# Patient Record
Sex: FEMALE | Race: WHITE | NOT HISPANIC OR LATINO | Employment: FULL TIME | URBAN - METROPOLITAN AREA
[De-identification: names, ages, dates, MRNs, and addresses within clinical notes are randomized per-mention and may not be internally consistent; named-entity substitution may affect disease eponyms.]

---

## 2017-01-21 DIAGNOSIS — Z96.653 STATUS POST TOTAL BILATERAL KNEE REPLACEMENT: ICD-10-CM

## 2017-01-21 RX ORDER — MELOXICAM 15 MG/1
TABLET ORAL
Qty: 30 TABLET | Refills: 0 | Status: SHIPPED | OUTPATIENT
Start: 2017-01-21 | End: 2017-03-03 | Stop reason: SDUPTHER

## 2017-01-21 RX ORDER — LOSARTAN POTASSIUM AND HYDROCHLOROTHIAZIDE 25; 100 MG/1; MG/1
TABLET ORAL
Qty: 90 TABLET | Refills: 0 | Status: CANCELLED | OUTPATIENT
Start: 2017-01-21

## 2017-01-21 NOTE — TELEPHONE ENCOUNTER
EH patient    I have not seen since 2015     Please call her to schedule EH appt and after that is schedule I will refill her meds thanks

## 2017-01-24 ENCOUNTER — TELEPHONE (OUTPATIENT)
Dept: ORTHOPEDICS | Facility: CLINIC | Age: 62
End: 2017-01-24

## 2017-01-30 RX ORDER — LOSARTAN POTASSIUM AND HYDROCHLOROTHIAZIDE 25; 100 MG/1; MG/1
1 TABLET ORAL DAILY
Qty: 90 TABLET | Refills: 0 | Status: SHIPPED | OUTPATIENT
Start: 2017-01-30 | End: 2017-03-17 | Stop reason: SDUPTHER

## 2017-01-30 NOTE — TELEPHONE ENCOUNTER
----- Message from Chapito Michelle sent at 1/27/2017  4:59 PM CST -----  Contact: Self   Type: Rx    Name of medication(s):losartan-hydrochlorothiazide 100-25 mg (HYZAAR) 100-25 mg per tablet     Is this a refill? New rx? Refill    Who prescribed medication? Dr Peres    Pharmacy Name, Phone, & Location:[facility]    Comments:Please advice    Thanks

## 2017-02-15 DIAGNOSIS — Z96.653 STATUS POST TOTAL BILATERAL KNEE REPLACEMENT: Primary | ICD-10-CM

## 2017-02-15 RX ORDER — TRAMADOL HYDROCHLORIDE 50 MG/1
50 TABLET ORAL EVERY 8 HOURS PRN
Qty: 60 TABLET | Refills: 0 | Status: SHIPPED | OUTPATIENT
Start: 2017-02-15 | End: 2017-08-22 | Stop reason: SDUPTHER

## 2017-03-03 DIAGNOSIS — Z96.653 STATUS POST TOTAL BILATERAL KNEE REPLACEMENT: ICD-10-CM

## 2017-03-03 RX ORDER — MELOXICAM 15 MG/1
TABLET ORAL
Qty: 30 TABLET | Refills: 0 | Status: SHIPPED | OUTPATIENT
Start: 2017-03-03 | End: 2017-04-11 | Stop reason: SDUPTHER

## 2017-03-17 ENCOUNTER — OFFICE VISIT (OUTPATIENT)
Dept: INTERNAL MEDICINE | Facility: CLINIC | Age: 62
End: 2017-03-17
Payer: COMMERCIAL

## 2017-03-17 ENCOUNTER — CLINICAL SUPPORT (OUTPATIENT)
Dept: INTERNAL MEDICINE | Facility: CLINIC | Age: 62
End: 2017-03-17
Attending: INTERNAL MEDICINE
Payer: COMMERCIAL

## 2017-03-17 ENCOUNTER — HOSPITAL ENCOUNTER (OUTPATIENT)
Dept: RADIOLOGY | Facility: HOSPITAL | Age: 62
Discharge: HOME | End: 2017-03-17
Attending: INTERNAL MEDICINE
Payer: COMMERCIAL

## 2017-03-17 ENCOUNTER — HOSPITAL ENCOUNTER (OUTPATIENT)
Dept: CARDIOLOGY | Facility: CLINIC | Age: 62
Discharge: HOME | End: 2017-03-17
Payer: COMMERCIAL

## 2017-03-17 VITALS
BODY MASS INDEX: 33.88 KG/M2 | DIASTOLIC BLOOD PRESSURE: 85 MMHG | SYSTOLIC BLOOD PRESSURE: 135 MMHG | WEIGHT: 198.44 LBS | HEIGHT: 64 IN

## 2017-03-17 DIAGNOSIS — Z00.00 ROUTINE GENERAL MEDICAL EXAMINATION AT A HEALTH CARE FACILITY: ICD-10-CM

## 2017-03-17 DIAGNOSIS — I10 BENIGN HYPERTENSION: ICD-10-CM

## 2017-03-17 DIAGNOSIS — Z00.00 ANNUAL PHYSICAL EXAM: Primary | ICD-10-CM

## 2017-03-17 DIAGNOSIS — Z96.653 STATUS POST TOTAL BILATERAL KNEE REPLACEMENT: ICD-10-CM

## 2017-03-17 DIAGNOSIS — Z00.00 ROUTINE GENERAL MEDICAL EXAMINATION AT A HEALTH CARE FACILITY: Primary | ICD-10-CM

## 2017-03-17 PROBLEM — Z87.891 FORMER SMOKER: Status: ACTIVE | Noted: 2017-03-17

## 2017-03-17 LAB
ALBUMIN SERPL BCP-MCNC: 3.7 G/DL
ALP SERPL-CCNC: 72 U/L
ALT SERPL W/O P-5'-P-CCNC: 26 U/L
ANION GAP SERPL CALC-SCNC: 6 MMOL/L
AST SERPL-CCNC: 18 U/L
BILIRUB SERPL-MCNC: 0.4 MG/DL
BUN SERPL-MCNC: 18 MG/DL
CALCIUM SERPL-MCNC: 9.4 MG/DL
CHLORIDE SERPL-SCNC: 108 MMOL/L
CHOLEST/HDLC SERPL: 5.9 {RATIO}
CO2 SERPL-SCNC: 28 MMOL/L
CREAT SERPL-MCNC: 0.7 MG/DL
ERYTHROCYTE [DISTWIDTH] IN BLOOD BY AUTOMATED COUNT: 12.7 %
EST. GFR  (AFRICAN AMERICAN): >60 ML/MIN/1.73 M^2
EST. GFR  (NON AFRICAN AMERICAN): >60 ML/MIN/1.73 M^2
GLUCOSE SERPL-MCNC: 102 MG/DL
HCT VFR BLD AUTO: 39.9 %
HDL/CHOLESTEROL RATIO: 16.9 %
HDLC SERPL-MCNC: 236 MG/DL
HDLC SERPL-MCNC: 40 MG/DL
HGB BLD-MCNC: 13.6 G/DL
HIV 1+2 AB+HIV1 P24 AG SERPL QL IA: NEGATIVE
LDLC SERPL CALC-MCNC: 162.8 MG/DL
MCH RBC QN AUTO: 31.6 PG
MCHC RBC AUTO-ENTMCNC: 34.1 %
MCV RBC AUTO: 93 FL
NONHDLC SERPL-MCNC: 196 MG/DL
PLATELET # BLD AUTO: 283 K/UL
PMV BLD AUTO: 9.5 FL
POTASSIUM SERPL-SCNC: 4.1 MMOL/L
PROT SERPL-MCNC: 6.8 G/DL
RBC # BLD AUTO: 4.31 M/UL
SODIUM SERPL-SCNC: 142 MMOL/L
TRIGL SERPL-MCNC: 166 MG/DL
WBC # BLD AUTO: 9.17 K/UL

## 2017-03-17 PROCEDURE — 85027 COMPLETE CBC AUTOMATED: CPT

## 2017-03-17 PROCEDURE — 86703 HIV-1/HIV-2 1 RESULT ANTBDY: CPT

## 2017-03-17 PROCEDURE — 3075F SYST BP GE 130 - 139MM HG: CPT | Mod: S$GLB,,, | Performed by: INTERNAL MEDICINE

## 2017-03-17 PROCEDURE — 3079F DIAST BP 80-89 MM HG: CPT | Mod: S$GLB,,, | Performed by: INTERNAL MEDICINE

## 2017-03-17 PROCEDURE — 77067 SCR MAMMO BI INCL CAD: CPT | Mod: 26,,, | Performed by: RADIOLOGY

## 2017-03-17 PROCEDURE — 80061 LIPID PANEL: CPT

## 2017-03-17 PROCEDURE — 99396 PREV VISIT EST AGE 40-64: CPT | Mod: S$GLB,,, | Performed by: INTERNAL MEDICINE

## 2017-03-17 PROCEDURE — 36415 COLL VENOUS BLD VENIPUNCTURE: CPT

## 2017-03-17 PROCEDURE — 93000 ELECTROCARDIOGRAM COMPLETE: CPT | Mod: S$GLB,,, | Performed by: INTERNAL MEDICINE

## 2017-03-17 PROCEDURE — 99999 PR PBB SHADOW E&M-EST. PATIENT-LVL IV: CPT | Mod: PBBFAC,,, | Performed by: INTERNAL MEDICINE

## 2017-03-17 PROCEDURE — 77067 SCR MAMMO BI INCL CAD: CPT | Mod: TC

## 2017-03-17 PROCEDURE — 80053 COMPREHEN METABOLIC PANEL: CPT

## 2017-03-17 RX ORDER — TRAMADOL HYDROCHLORIDE 50 MG/1
50 TABLET ORAL EVERY 8 HOURS PRN
Qty: 60 TABLET | Refills: 0 | Status: CANCELLED | OUTPATIENT
Start: 2017-03-17

## 2017-03-17 RX ORDER — METOPROLOL SUCCINATE 50 MG/1
50 TABLET, EXTENDED RELEASE ORAL DAILY
Qty: 90 TABLET | Refills: 3 | Status: SHIPPED | OUTPATIENT
Start: 2017-03-17 | End: 2018-01-03 | Stop reason: ALTCHOICE

## 2017-03-17 RX ORDER — DICLOFENAC SODIUM 10 MG/G
GEL TOPICAL
Qty: 1 TUBE | Refills: 2 | Status: CANCELLED | OUTPATIENT
Start: 2017-03-17

## 2017-03-17 RX ORDER — LOSARTAN POTASSIUM AND HYDROCHLOROTHIAZIDE 25; 100 MG/1; MG/1
1 TABLET ORAL DAILY
Qty: 90 TABLET | Refills: 0 | Status: SHIPPED | OUTPATIENT
Start: 2017-03-17 | End: 2017-05-24 | Stop reason: SDUPTHER

## 2017-03-17 NOTE — MR AVS SNAPSHOT
[facility]  [address]  [facility] [address]  Phone: [phone]  Fax: [phone]                  Shanda Shaffer   3/17/2017 9:30 AM   Office Visit    Description:  Female : 1955   Provider:  Manuela Peres MD   Department:  [facility]           Reason for Visit     Executive Health           Diagnoses this Visit        Comments    Annual physical exam    -  Primary     BMI 34.0-34.9,adult                To Do List           Future Appointments        Provider Department Dept Phone    3/17/2017 10:40 AM [facility] [id number] SCREEN [facility] [phone]    3/27/2017 9:45 AM Reji Miller MD [facility] - Orthopedics [phone]      Goals (5 Years of Data)     None      [facility]     [facility] On [facility] Care [facility] -  Assistance  Registered nurses in the [facility] Center provide clinical advisement, health education, appointment booking, and other advisory services.  Call for this free service at [phone].             Medications           Message regarding Medications     Verify the changes and/or additions to your medication regime listed below are the same as discussed with your clinician today.  If any of these changes or additions are incorrect, please notify your healthcare provider.        STOP taking these medications     pantoprazole (PROTONIX) 40 MG tablet Take 40 mg by mouth once daily.           Verify that the below list of medications is an accurate representation of the medications you are currently taking.  If none reported, the list may be blank. If incorrect, please contact your healthcare provider. Carry this list with you in case of emergency.           Current Medications     diclofenac sodium 1 % Gel APPLY 2 GRAMS TOPICALLY 4 TIMES DAILY    estradiol (ESTRING) 2 mg vaginal ring Place 2 mg vaginally every 3 (three) months.    losartan-hydrochlorothiazide 100-25 mg (HYZAAR) 100-25 mg per  "tablet Take 1 tablet by mouth once daily.    meloxicam (MOBIC) 15 MG tablet TAKE ONE TABLET BY MOUTH ONCE DAILY    metoprolol succinate (TOPROL-XL) 50 MG 24 hr tablet Take 1 tablet (50 mg total) by mouth once daily.    tramadol (ULTRAM) 50 mg tablet Take 1 tablet (50 mg total) by mouth every 8 (eight) hours as needed for Pain.    trospium (SANCTURA) 20 mg Tab tablet Take 1 tablet (20 mg total) by mouth 2 (two) times daily.           Clinical Reference Information           Your Vitals Were     BP Height Weight BMI       135/85 (BP Location: Right arm, Patient Position: Sitting, BP Method: Manual) 5' 4" (1.626 m) 90 kg (198 lb 6.6 oz) 34.06 kg/m2       Blood Pressure          Most Recent Value    BP  135/85      Allergies as of 3/17/2017     Percocet [Oxycodone-acetaminophen]      Immunizations Administered on Date of Encounter - 3/17/2017     None      Orders Placed During Today's Visit      Normal Orders This Visit    Ambulatory consult to Bariatric Surgery     Future Labs/Procedures Expected by Expires    TSH  3/17/2017 3/17/2018      Language Assistance Services     ATTENTION: Language assistance services are available, free of charge. Please call [phone].      ATENCIÓN: Si habla jay, tiene a fine disposición servicios gratuitos de asistencia lingüística. Llame al [phone].     JONNA Ý: N?u b?n nói Ti?ng Vi?t, có các d?ch v? h? tr? ngôn ng? mi?n phí dành cho b?n. G?i s? [phone].         [facility] complies with applicable Federal civil rights laws and does not discriminate on the basis of race, color, national origin, age, disability, or sex.        "

## 2017-03-17 NOTE — PROGRESS NOTES
Subjective:       Patient ID: Shanda Shaffer is a 62 y.o. female.    Chief Complaint: Executive Health    HPI Comments: EH PE    Entergy, [place]     Weight gain > 30 # since moving to , needs to work on non weight bearing exercise.   Limited exercise due to bilateral TKR and L leg still hurts.  She is following closely in Orthopedics.  Still can't put weight or balance well.    Despite PT, injections.    NO PALOMO sx or thyroid.    BMI 34 now- discussed.      Patient Active Problem List:     Benign hypertension     Bladder prolapse, female, acquired     Primary osteoarthritis of both knees     Mixed hyperlipidemia     Menopause syndrome     Esophageal stricture     Gastroesophageal reflux disease without esophagitis     Diverticulosis of large intestine without hemorrhage     Status post total bilateral knee replacement 2/23/2016     Slow transit constipation     Former smoker: 1 pack a day for 30 years, quit 3/11/10     BMI > 30        Review of Systems   Constitutional: Negative for activity change, appetite change, chills, fatigue and fever.        Weight gain   HENT: Negative for ear discharge, nosebleeds, sinus pressure and sore throat.    Respiratory: Negative for apnea, cough, chest tightness, shortness of breath and wheezing.    Cardiovascular: Negative for chest pain, palpitations and leg swelling.   Gastrointestinal: Negative for abdominal distention, abdominal pain, anal bleeding, blood in stool, constipation, diarrhea, nausea and vomiting.   Genitourinary: Negative for difficulty urinating, dysuria, frequency, hematuria and vaginal bleeding.   Musculoskeletal: Positive for arthralgias. Negative for back pain, gait problem, joint swelling and myalgias.   Skin: Negative for rash.   Neurological: Negative for dizziness, tremors, weakness, light-headedness, numbness and headaches.   Hematological: Negative for adenopathy. Does not bruise/bleed easily.   Psychiatric/Behavioral: Negative for confusion,  hallucinations, sleep disturbance and suicidal ideas.       Objective:      Physical Exam   Constitutional: She is oriented to person, place, and time. She appears well-developed and well-nourished.   HENT:   Head: Normocephalic and atraumatic.   Right Ear: External ear normal.   Left Ear: External ear normal.   Nose: Nose normal.   Mouth/Throat: Oropharynx is clear and moist. No oropharyngeal exudate.   Eyes: Conjunctivae and EOM are normal. No scleral icterus.   Neck: Normal range of motion. Neck supple. No JVD present. No thyromegaly present.   Cardiovascular: Normal rate, regular rhythm, normal heart sounds and intact distal pulses.  Exam reveals no gallop.    No murmur heard.  Pulmonary/Chest: Effort normal and breath sounds normal. No respiratory distress. She has no wheezes. She exhibits no tenderness.   Abdominal: Soft. Bowel sounds are normal. She exhibits no distension and no mass. There is no tenderness. There is no rebound and no guarding.   Musculoskeletal: Normal range of motion. She exhibits no edema or tenderness.   Lymphadenopathy:     She has no cervical adenopathy.   Neurological: She is alert and oriented to person, place, and time. She displays normal reflexes. No cranial nerve deficit. Coordination normal.   Skin: Skin is warm. No rash noted. No erythema.   Psychiatric: She has a normal mood and affect. Her behavior is normal. Judgment and thought content normal.   Nursing note and vitals reviewed.      Assessment:       1. Annual physical exam    2. BMI 34.0-34.9,adult        Plan:         Annual physical exam  -     TSH; Future; Expected date: 3/17/17  -     Varicella zoster antibody, IgG; Future; Expected date: 3/17/17    BMI 34.0-34.9,adult  -     Ambulatory consult to Bariatric Surgery    I have reviewed ACC/AHA guidelines with patient in terms of risk stratification and cholesterol guidelines and treatment  Consider shingles vaccine  Weight loss, medical bariatrics  Recommended screening  low dose CT- she declines    EKG acceptable  Mammo pending    I will review all studies and determine further tx depending on findings

## 2017-03-17 NOTE — LETTER
2017    Shanda Shaffer  [address] [address]             [facility]  [address]  [place] [address]  Phone: [phone]  Fax: [phone] Dear Mrs. Shaffer:    Thank you for allowing me to serve you and perform your Executive Health exam on 3/17/2017.  This letter will serve a brief summary of the history, physical findings, and laboratory/studies performed and recommendations at that time.    Reason for Visit: Executive Health Preventive Physical Examination    Past Medical History:   Diagnosis Date    Acne vulgaris 2015    Benign hypertension 2015    Bladder prolapse, female, acquired 2015    Diverticulosis of large intestine without hemorrhage 2015    Esophageal stricture 2015    Former smoker: 1 pack a day for 30 years, quit 3/11/10 3/17/2017    Gastroesophageal reflux disease without esophagitis 2015    Hyperlipidemia     Non morbid obesity due to excess calories 2015    Primary osteoarthritis of both knees 2015       Past Surgical History:   Procedure Laterality Date    bilateral knee surgery  2016    BREAST SURGERY      cyst removal     SECTION      x 4    KNEE SURGERY Bilateral 2016    TK    TONSILLECTOMY         Family History   Problem Relation Age of Onset    Diabetes Mother     Heart disease Mother     Arthritis Mother     Hypertension Father     Heart disease Father      PPM    Diabetes Sister     Heart disease Sister      valve replacement    Diabetes Brother     Arthritis Brother      RA    No Known Problems Son     Cancer Maternal Grandmother      breast    No Known Problems Son     No Known Problems Son     No Known Problems Son             Review of patient's allergies indicates:   Allergen Reactions    Percocet [oxycodone-acetaminophen] Nausea Only     Severe nausea with inability to eat.         Current Outpatient Prescriptions:      diclofenac sodium 1 % Gel, APPLY 2 GRAMS TOPICALLY 4 TIMES DAILY, Disp: 1 Tube, Rfl: 2    estradiol (ESTRING) 2 mg vaginal ring, Place 2 mg vaginally every 3 (three) months., Disp: 1 each, Rfl: 3    meloxicam (MOBIC) 15 MG tablet, TAKE ONE TABLET BY MOUTH ONCE DAILY, Disp: 30 tablet, Rfl: 0    tramadol (ULTRAM) 50 mg tablet, Take 1 tablet (50 mg total) by mouth every 8 (eight) hours as needed for Pain., Disp: 60 tablet, Rfl: 0    trospium (SANCTURA) 20 mg Tab tablet, Take 1 tablet (20 mg total) by mouth 2 (two) times daily., Disp: 180 tablet, Rfl: 3    losartan-hydrochlorothiazide 100-25 mg (HYZAAR) 100-25 mg per tablet, Take 1 tablet by mouth once daily., Disp: 90 tablet, Rfl: 0    metoprolol succinate (TOPROL-XL) 50 MG 24 hr tablet, Take 1 tablet (50 mg total) by mouth once daily., Disp: 90 tablet, Rfl: 3     Review of Systems  Review of Systems - Negative except for persistent left knee issues and some slight weight gain.    Physical Exam:  General: General appearance: alert, well appearing, and in no distress.   Skin: Skin exam - normal coloration and turgor, no rashes, no suspicious skin lesions noted.  HEENT: Ears - bilateral TM's and external ear canals normal. , ENT exam reveals - ENT exam normal, no neck nodes or sinus tenderness.   Lungs: Chest: clear to auscultation, no wheezes, rales or rhonchi, symmetric air entry.   Heart: CVS exam: normal rate, regular rhythm, normal S1, S2, no murmurs, rubs, clicks or gallops.   Extremities: Exam of extremities: peripheral pulses normal, no pedal edema, no clubbing or cyanosis    Labs:  Results for orders placed or performed in visit on 03/17/17   Comprehensive metabolic panel   Result Value Ref Range    Sodium 142 136 - 145 mmol/L    Potassium 4.1 3.5 - 5.1 mmol/L    Chloride 108 95 - 110 mmol/L    CO2 28 23 - 29 mmol/L    Glucose 102 70 - 110 mg/dL    BUN, Bld 18 8 - 23 mg/dL    Creatinine 0.7 0.5 - 1.4 mg/dL    Calcium 9.4 8.7 - 10.5 mg/dL    Total Protein  6.8 6.0 - 8.4 g/dL    Albumin 3.7 3.5 - 5.2 g/dL    Total Bilirubin 0.4 0.1 - 1.0 mg/dL    Alkaline Phosphatase 72 55 - 135 U/L    AST 18 10 - 40 U/L    ALT 26 10 - 44 U/L    Anion Gap 6 (L) 8 - 16 mmol/L    eGFR if African American >60.0 >60 mL/min/1.73 m^2    eGFR if non African American >60.0 >60 mL/min/1.73 m^2   Hematology Profile   Result Value Ref Range    WBC 9.17 3.90 - 12.70 K/uL    RBC 4.31 4.00 - 5.40 M/uL    Hemoglobin 13.6 12.0 - 16.0 g/dL    Hematocrit 39.9 37.0 - 48.5 %    MCV 93 82 - 98 fL    MCH 31.6 (H) 27.0 - 31.0 pg    MCHC 34.1 32.0 - 36.0 %    RDW 12.7 11.5 - 14.5 %    Platelets 283 150 - 350 K/uL    MPV 9.5 9.2 - 12.9 fL   Lipid panel   Result Value Ref Range    Cholesterol 236 (H) 120 - 199 mg/dL    Triglycerides 166 (H) 30 - 150 mg/dL    HDL 40 40 - 75 mg/dL    LDL Cholesterol 162.8 (H) 63.0 - 159.0 mg/dL    HDL/Chol Ratio 16.9 (L) 20.0 - 50.0 %    Total Cholesterol/HDL Ratio 5.9 (H) 2.0 - 5.0    Non-HDL Cholesterol 196 mg/dL      Assessment/Recommendations:  Routine Health Maintenance is up to date.   We discussed a shingles vaccine and you want to be tested for chickenpox before you have this done, which is reasonable.      Given your smoking history, I recommend a screening low-dose CT scan.    We also discussed a medical bariatric consultation given the weight gain that you have experienced along with your exercise limitations given your orthopedic constraints.    We are also going to testing for thyroid again.    At this time, you appear to be in good medical condition.    However, your cholesterol is elevated.    Given your hypertension and your age, I would strongly encourage you to consider cholesterol medication.  I recommend  Lipitor or Crestor.  We would monitor your liver labs very closely.  This will help to reduce your future risk for heart disease or stroke.  Please let me know if you would be willing to consider cholesterol treatment.    I will review your lab work when it  is completed.    I look forward to seeing you again next year.  Please contact me should you have any questions or concerns regarding physical findings, or my recommendations.        Sincerely,          Manuela Peres MD, FACP

## 2017-03-24 ENCOUNTER — LAB VISIT (OUTPATIENT)
Dept: LAB | Facility: HOSPITAL | Age: 62
End: 2017-03-24
Attending: INTERNAL MEDICINE
Payer: COMMERCIAL

## 2017-03-24 DIAGNOSIS — M25.561 PAIN IN BOTH KNEES, UNSPECIFIED CHRONICITY: Primary | ICD-10-CM

## 2017-03-24 DIAGNOSIS — M25.562 PAIN IN BOTH KNEES, UNSPECIFIED CHRONICITY: Primary | ICD-10-CM

## 2017-03-24 DIAGNOSIS — Z00.00 ANNUAL PHYSICAL EXAM: ICD-10-CM

## 2017-03-24 LAB — TSH SERPL DL<=0.005 MIU/L-ACNC: 1.39 UIU/ML

## 2017-03-24 PROCEDURE — 36415 COLL VENOUS BLD VENIPUNCTURE: CPT

## 2017-03-24 PROCEDURE — 86787 VARICELLA-ZOSTER ANTIBODY: CPT

## 2017-03-24 PROCEDURE — 84443 ASSAY THYROID STIM HORMONE: CPT

## 2017-03-27 ENCOUNTER — HOSPITAL ENCOUNTER (OUTPATIENT)
Dept: RADIOLOGY | Facility: HOSPITAL | Age: 62
Discharge: HOME | End: 2017-03-27
Attending: ORTHOPAEDIC SURGERY
Payer: COMMERCIAL

## 2017-03-27 ENCOUNTER — PATIENT MESSAGE (OUTPATIENT)
Dept: INTERNAL MEDICINE | Facility: CLINIC | Age: 62
End: 2017-03-27

## 2017-03-27 ENCOUNTER — OFFICE VISIT (OUTPATIENT)
Dept: OBSTETRICS AND GYNECOLOGY | Facility: CLINIC | Age: 62
End: 2017-03-27
Payer: COMMERCIAL

## 2017-03-27 ENCOUNTER — TELEPHONE (OUTPATIENT)
Dept: OBSTETRICS AND GYNECOLOGY | Facility: CLINIC | Age: 62
End: 2017-03-27

## 2017-03-27 ENCOUNTER — OFFICE VISIT (OUTPATIENT)
Dept: ORTHOPEDICS | Facility: CLINIC | Age: 62
End: 2017-03-27
Payer: COMMERCIAL

## 2017-03-27 VITALS — HEIGHT: 64 IN | WEIGHT: 195 LBS | BODY MASS INDEX: 33.29 KG/M2

## 2017-03-27 VITALS
WEIGHT: 196.13 LBS | DIASTOLIC BLOOD PRESSURE: 88 MMHG | SYSTOLIC BLOOD PRESSURE: 140 MMHG | HEIGHT: 64 IN | BODY MASS INDEX: 33.48 KG/M2

## 2017-03-27 DIAGNOSIS — N76.0 ACUTE VAGINITIS: Primary | ICD-10-CM

## 2017-03-27 DIAGNOSIS — M25.561 PAIN IN BOTH KNEES, UNSPECIFIED CHRONICITY: ICD-10-CM

## 2017-03-27 DIAGNOSIS — M70.50 PES ANSERINE BURSITIS: ICD-10-CM

## 2017-03-27 DIAGNOSIS — M25.562 PAIN IN BOTH KNEES, UNSPECIFIED CHRONICITY: ICD-10-CM

## 2017-03-27 DIAGNOSIS — Z96.653 STATUS POST TOTAL BILATERAL KNEE REPLACEMENT: Primary | ICD-10-CM

## 2017-03-27 DIAGNOSIS — G89.29 CHRONIC PAIN OF LEFT KNEE: ICD-10-CM

## 2017-03-27 DIAGNOSIS — M25.562 CHRONIC PAIN OF LEFT KNEE: ICD-10-CM

## 2017-03-27 LAB
CANDIDA RRNA VAG QL PROBE: NEGATIVE
G VAGINALIS RRNA GENITAL QL PROBE: NEGATIVE
T VAGINALIS RRNA GENITAL QL PROBE: NEGATIVE
VARICELLA INTERPRETATION: POSITIVE
VARICELLA ZOSTER IGG: 3.35 ISR

## 2017-03-27 PROCEDURE — 99999 PR PBB SHADOW E&M-EST. PATIENT-LVL II: CPT | Mod: PBBFAC,,, | Performed by: ORTHOPAEDIC SURGERY

## 2017-03-27 PROCEDURE — 99213 OFFICE O/P EST LOW 20 MIN: CPT | Mod: S$GLB,,, | Performed by: ORTHOPAEDIC SURGERY

## 2017-03-27 PROCEDURE — 73562 X-RAY EXAM OF KNEE 3: CPT | Mod: TC,50

## 2017-03-27 PROCEDURE — 99999 PR PBB SHADOW E&M-EST. PATIENT-LVL III: CPT | Mod: PBBFAC,,, | Performed by: NURSE PRACTITIONER

## 2017-03-27 PROCEDURE — 3079F DIAST BP 80-89 MM HG: CPT | Mod: S$GLB,,, | Performed by: NURSE PRACTITIONER

## 2017-03-27 PROCEDURE — 99213 OFFICE O/P EST LOW 20 MIN: CPT | Mod: S$GLB,,, | Performed by: NURSE PRACTITIONER

## 2017-03-27 PROCEDURE — 87480 CANDIDA DNA DIR PROBE: CPT

## 2017-03-27 PROCEDURE — 1160F RVW MEDS BY RX/DR IN RCRD: CPT | Mod: S$GLB,,, | Performed by: ORTHOPAEDIC SURGERY

## 2017-03-27 PROCEDURE — 73562 X-RAY EXAM OF KNEE 3: CPT | Mod: 26,50,, | Performed by: RADIOLOGY

## 2017-03-27 PROCEDURE — 3077F SYST BP >= 140 MM HG: CPT | Mod: S$GLB,,, | Performed by: NURSE PRACTITIONER

## 2017-03-27 PROCEDURE — 1160F RVW MEDS BY RX/DR IN RCRD: CPT | Mod: S$GLB,,, | Performed by: NURSE PRACTITIONER

## 2017-03-27 RX ORDER — FLUCONAZOLE 150 MG/1
150 TABLET ORAL ONCE
Qty: 1 TABLET | Refills: 1 | Status: SHIPPED | OUTPATIENT
Start: 2017-03-27 | End: 2017-03-27

## 2017-03-27 NOTE — TELEPHONE ENCOUNTER
----- Message from Gwendolyn Frye sent at 3/27/2017  7:53 AM CDT -----  Contact: self: [phone]  Pt called and would like an appt to see doctor today for a possible yeast infection. Pt did not want to see another doctor.    Pt can be reached at [phone]    Thank you

## 2017-03-27 NOTE — PROGRESS NOTES
CC: Vaginal Discharge    Shanda Shaffer is a 62 y.o. female  presents with complaint of vaginal discharge for 1 week.  She reports itching.  reports odor.  She states the discharge is clear.  No new sexual partners.  She tried OTC Miconazole 1 with minimal relief of symptoms.         ROS:  GENERAL: No fever, chills, fatigability or weight loss.  VULVAR: No pain, no lesions and no itching.  VAGINAL: No relaxation, + itching, + discharge, + odor no abnormal bleeding and no lesions.  ABDOMEN: No abdominal pain. Denies nausea. Denies vomiting. No diarrhea. No constipation  BREAST: Denies pain. No lumps. No discharge.  URINARY: No incontinence, no nocturia, no frequency and no dysuria.  CARDIOVASCULAR: No chest pain. No shortness of breath. No leg cramps.  NEUROLOGICAL: No headaches. No vision changes.    PHYSICAL EXAM:  VULVA: normal appearing vulva with no masses, tenderness or lesions   VAGINA: normal appearing vagina with normal color and + thick white discharge, no lesions   CERVIX: normal appearing cervix without discharge or lesions   UTERUS: uterus is normal size, shape, consistency and nontender   ADNEXA: normal adnexa in size, nontender and no masses    ASSESSMENT and PLAN:    ICD-10-CM ICD-9-CM    1. Acute vaginitis N76.0 616.10 Vaginosis Screen by DNA Probe      fluconazole (DIFLUCAN) 150 MG Tab     Affirm   Diflucan      Patient was counseled today on vaginitis prevention including :  a. avoiding feminine products such as deoderant soaps, body wash, bubble bath, douches, scented toilet paper, deoderant tampons or pads, feminine wipes, chronic pad use, etc.  b. avoiding other vulvovaginal irritants such as long hot baths, humidity, tight, synthetic clothing, chlorine and sitting around in wet bathing suits  c. wearing cotton underwear, avoiding thong underwear and no underwear to bed  d. taking showers instead of baths and use a hair dryer on cool setting afterwards to dry  e. wearing cotton to exercise  and shower immediately after exercise and change clothes  f. using polyurethane condoms without spermicide if sexually active and symptoms are triggered by intercourse    FOLLOW UP: PRN lack of improvement.    Celestina Mena, PRANAY-C

## 2017-03-27 NOTE — PROGRESS NOTES
"Subjective:      Patient ID: Shanda Shaffer is a 62 y.o. female.    Chief Complaint: Pain of the Left Knee  13 months s/p Bilateral TKA.  Right knee doing well.  HPI  Shanda Shaffer has left knee pain.  The pain has worsened. The pain is located in the medial aspect of the knee.  There is is radiation.  The pain radaites to the proximal leg.  There is not associated stiffness.   There is not catching and locking. The pain is described as achy. The pain is aggravated by stairs.  It is alleviated by rest.  There is not associated back pain.  Her history, medications and problem list were reviewed.    Review of Systems   Constitution: Negative for chills, fever and night sweats.   HENT: Negative for headaches and hearing loss.    Eyes: Negative for blurred vision and double vision.   Cardiovascular: Negative for chest pain, claudication and leg swelling.   Respiratory: Negative for shortness of breath.    Endocrine: Negative for polydipsia, polyphagia and polyuria.   Hematologic/Lymphatic: Negative for adenopathy and bleeding problem. Does not bruise/bleed easily.   Skin: Negative for poor wound healing.   Musculoskeletal: Positive for joint pain.   Gastrointestinal: Negative for diarrhea and heartburn.   Genitourinary: Negative for bladder incontinence.   Neurological: Negative for focal weakness, numbness, paresthesias and sensory change.   Psychiatric/Behavioral: The patient is not nervous/anxious.    Allergic/Immunologic: Negative for persistent infections.         Objective:      Body mass index is 33.47 kg/(m^2).  Vitals:    03/27/17 1002   Weight: 88.5 kg (195 lb)   Height: 5' 4" (1.626 m)           General    Constitutional: She is oriented to person, place, and time. She appears well-developed and well-nourished.   HENT:   Head: Normocephalic and atraumatic.   Eyes: EOM are normal.   Cardiovascular: Normal rate.    Pulmonary/Chest: Effort normal.   Neurological: She is alert and oriented to person, place, and " time.   Psychiatric: She has a normal mood and affect. Her behavior is normal.     General Musculoskeletal Exam   Gait: abnormal and antalgic       Right Knee Exam     Inspection   Erythema: absent  Scars: absent  Swelling: absent  Effusion: effusion  Deformity: deformity  Bruising: absent    Tenderness   The patient is experiencing no tenderness.         Range of Motion   Extension: 0   Flexion: 130     Tests   Ligament Examination Lachman: normal (-1 to 2mm)   MCL - Valgus: normal (0 to 2mm)  LCL - Varus: normal  Patella   Passive Patellar Tilt: neutral    Other   Sensation: normal    Left Knee Exam     Inspection   Erythema: absent  Scars: absent  Swelling: present  Effusion: absent  Deformity: deformity (Correctable valgus)  Bruising: absent    Tenderness   The patient tender to palpation of the medial joint line and medial retinaculum.    Range of Motion   Extension: 0 (5 degree lag)   Flexion: 130     Tests   Stability   Lachman: abnormal  - grade II  MCL - Valgus: abnormal - grade I  LCL - Varus: abnormal - grade I  Patella   Passive Patellar Tilt: neutral    Other   Sensation: normal    Muscle Strength   Right Lower Extremity   Hip Abduction: 5/5   Quadriceps:  5/5   Hamstrin/5   Left Lower Extremity   Hip Abduction: 5/5   Quadriceps:  5/5   Hamstrin/5     Reflexes     Left Side  Quadriceps:  2+    Right Side   Quadriceps:  2+    Vascular Exam     Right Pulses  Dorsalis Pedis:      2+          Left Pulses  Dorsalis Pedis:      2+          Edema  Right Lower Leg: absent  Left Lower Leg: absent      Radiographs taken today were reviewed by me.  There is a prosthetic replacement of the bilateral knee(s).  The prosthesis is well positioned.  There is not evidence of bone loss, osteolysis, or loosening. There is not a fracture.            Assessment:       Encounter Diagnoses   Name Primary?    Status post total bilateral knee replacement 2016 Yes    Chronic pain of left knee     Pes anserine  bursitis           Plan:       Shanda was seen today for pain.    Diagnoses and all orders for this visit:    Status post total bilateral knee replacement 2/23/2016  -     C-reactive protein; Future  -     Sedimentation rate, manual; Future  -     CT Lower Extremity Without Contrast Left; Future    Chronic pain of left knee  -     C-reactive protein; Future  -     Sedimentation rate, manual; Future  -     CT Lower Extremity Without Contrast Left; Future    Pes anserine bursitis  -     C-reactive protein; Future  -     Sedimentation rate, manual; Future  -     CT Lower Extremity Without Contrast Left; Future      CRP/ESR.  CT left knee.    F/U with me after CT

## 2017-03-31 ENCOUNTER — PATIENT MESSAGE (OUTPATIENT)
Dept: OBSTETRICS AND GYNECOLOGY | Facility: CLINIC | Age: 62
End: 2017-03-31

## 2017-03-31 ENCOUNTER — HOSPITAL ENCOUNTER (OUTPATIENT)
Dept: RADIOLOGY | Facility: HOSPITAL | Age: 62
Discharge: HOME | End: 2017-03-31
Attending: ORTHOPAEDIC SURGERY
Payer: COMMERCIAL

## 2017-03-31 DIAGNOSIS — M70.50 PES ANSERINE BURSITIS: ICD-10-CM

## 2017-03-31 DIAGNOSIS — Z96.653 STATUS POST TOTAL BILATERAL KNEE REPLACEMENT: ICD-10-CM

## 2017-03-31 DIAGNOSIS — M25.562 CHRONIC PAIN OF LEFT KNEE: ICD-10-CM

## 2017-03-31 DIAGNOSIS — N89.8 VAGINAL ITCHING: Primary | ICD-10-CM

## 2017-03-31 DIAGNOSIS — G89.29 CHRONIC PAIN OF LEFT KNEE: ICD-10-CM

## 2017-03-31 PROCEDURE — 73700 CT LOWER EXTREMITY W/O DYE: CPT | Mod: 26,LT,, | Performed by: RADIOLOGY

## 2017-03-31 PROCEDURE — 73700 CT LOWER EXTREMITY W/O DYE: CPT | Mod: TC,LT

## 2017-03-31 RX ORDER — NYSTATIN AND TRIAMCINOLONE ACETONIDE 100000; 1 [USP'U]/G; MG/G
CREAM TOPICAL
Qty: 30 G | Refills: 1 | Status: SHIPPED | OUTPATIENT
Start: 2017-03-31 | End: 2017-04-20 | Stop reason: SDUPTHER

## 2017-04-02 DIAGNOSIS — M25.562 CHRONIC PAIN OF LEFT KNEE: Primary | ICD-10-CM

## 2017-04-02 DIAGNOSIS — G89.29 CHRONIC PAIN OF LEFT KNEE: Primary | ICD-10-CM

## 2017-04-03 ENCOUNTER — TELEPHONE (OUTPATIENT)
Dept: ORTHOPEDICS | Facility: CLINIC | Age: 62
End: 2017-04-03

## 2017-04-03 NOTE — TELEPHONE ENCOUNTER
----- Message from Reji Miller MD sent at 4/2/2017  7:43 AM CDT -----  Please call pt.  I reviewed the CT.  Rotation is normal.  Sglazaro neeeds a standing hip to ankle film done on the 2nd floor before her next appt.  Orders are in

## 2017-04-03 NOTE — TELEPHONE ENCOUNTER
Spoke to pt and she was notified of her results and she understood. The xray was scheduled and the appointment slip was mailed to her home.

## 2017-04-06 ENCOUNTER — TELEPHONE (OUTPATIENT)
Dept: ORTHOPEDICS | Facility: CLINIC | Age: 62
End: 2017-04-06

## 2017-04-06 NOTE — TELEPHONE ENCOUNTER
----- Message from Humble Cummings sent at 4/6/2017 11:55 AM CDT -----  Contact: self  Pt request a call regarding her bone length x ray. She states she was informed it was supposed to be before her appointment

## 2017-04-06 NOTE — TELEPHONE ENCOUNTER
Spoke to pt and she was notified the xray is on the 2nd floor and she could check in at 8 am for the xray.

## 2017-04-11 DIAGNOSIS — Z96.653 STATUS POST TOTAL BILATERAL KNEE REPLACEMENT: ICD-10-CM

## 2017-04-12 RX ORDER — MELOXICAM 15 MG/1
TABLET ORAL
Qty: 30 TABLET | Refills: 0 | Status: SHIPPED | OUTPATIENT
Start: 2017-04-12 | End: 2017-05-26 | Stop reason: SDUPTHER

## 2017-04-13 ENCOUNTER — PATIENT MESSAGE (OUTPATIENT)
Dept: OBSTETRICS AND GYNECOLOGY | Facility: CLINIC | Age: 62
End: 2017-04-13

## 2017-04-14 ENCOUNTER — PATIENT MESSAGE (OUTPATIENT)
Dept: INTERNAL MEDICINE | Facility: CLINIC | Age: 62
End: 2017-04-14

## 2017-04-17 ENCOUNTER — OFFICE VISIT (OUTPATIENT)
Dept: OBSTETRICS AND GYNECOLOGY | Facility: CLINIC | Age: 62
End: 2017-04-17
Payer: COMMERCIAL

## 2017-04-17 ENCOUNTER — PATIENT MESSAGE (OUTPATIENT)
Dept: OBSTETRICS AND GYNECOLOGY | Facility: CLINIC | Age: 62
End: 2017-04-17

## 2017-04-17 ENCOUNTER — TELEPHONE (OUTPATIENT)
Dept: OBSTETRICS AND GYNECOLOGY | Facility: CLINIC | Age: 62
End: 2017-04-17

## 2017-04-17 VITALS
HEIGHT: 64 IN | SYSTOLIC BLOOD PRESSURE: 134 MMHG | DIASTOLIC BLOOD PRESSURE: 88 MMHG | BODY MASS INDEX: 33.76 KG/M2 | WEIGHT: 197.75 LBS

## 2017-04-17 DIAGNOSIS — B37.31 VAGINAL YEAST INFECTION: Primary | ICD-10-CM

## 2017-04-17 DIAGNOSIS — R30.0 DYSURIA: ICD-10-CM

## 2017-04-17 DIAGNOSIS — R10.2 VULVOVAGINAL DISCOMFORT: ICD-10-CM

## 2017-04-17 LAB
BILIRUB SERPL-MCNC: NORMAL MG/DL
BLOOD URINE, POC: NORMAL
CANDIDA RRNA VAG QL PROBE: NEGATIVE
COLOR, POC UA: NORMAL
G VAGINALIS RRNA GENITAL QL PROBE: NEGATIVE
GLUCOSE UR QL STRIP: NORMAL
KETONES UR QL STRIP: NORMAL
LEUKOCYTE ESTERASE URINE, POC: NORMAL
NITRITE, POC UA: NORMAL
PH, POC UA: NORMAL
PROTEIN, POC: NORMAL
SPECIFIC GRAVITY, POC UA: NORMAL
T VAGINALIS RRNA GENITAL QL PROBE: NEGATIVE
UROBILINOGEN, POC UA: NORMAL

## 2017-04-17 PROCEDURE — 87480 CANDIDA DNA DIR PROBE: CPT

## 2017-04-17 PROCEDURE — 1160F RVW MEDS BY RX/DR IN RCRD: CPT | Mod: S$GLB,,, | Performed by: NURSE PRACTITIONER

## 2017-04-17 PROCEDURE — 81002 URINALYSIS NONAUTO W/O SCOPE: CPT | Mod: S$GLB,,, | Performed by: NURSE PRACTITIONER

## 2017-04-17 PROCEDURE — 99213 OFFICE O/P EST LOW 20 MIN: CPT | Mod: 25,S$GLB,, | Performed by: NURSE PRACTITIONER

## 2017-04-17 PROCEDURE — 3079F DIAST BP 80-89 MM HG: CPT | Mod: S$GLB,,, | Performed by: NURSE PRACTITIONER

## 2017-04-17 PROCEDURE — 99999 PR PBB SHADOW E&M-EST. PATIENT-LVL III: CPT | Mod: PBBFAC,,, | Performed by: NURSE PRACTITIONER

## 2017-04-17 PROCEDURE — 3075F SYST BP GE 130 - 139MM HG: CPT | Mod: S$GLB,,, | Performed by: NURSE PRACTITIONER

## 2017-04-17 RX ORDER — FLUCONAZOLE 150 MG/1
150 TABLET ORAL ONCE
Qty: 1 TABLET | Refills: 1 | Status: SHIPPED | OUTPATIENT
Start: 2017-04-17 | End: 2017-04-17

## 2017-04-17 NOTE — PROGRESS NOTES
"Shanda Shaffer is a 62 y.o. female  presents to urgent GYN care with complaint of vulvovaginal discomfort and itching. Pt was seen in the clinic on 3/27/17 with c/o yeast s/s-affirm was negative. Dr. March gave her mycolog cream on 3/31/17 to help with the raw feeling and itching. Pt states the redness has improved since using cream, but still reports a discomfort with urinating. She doesn't think she has a UTI-thinks skin is irritated. Pt states her  looked at it and reported "canker sores" near her urethra.     Pt sees Dr. March for her GYN care and is due for her WWE after 17.     Past Medical History:   Diagnosis Date    Acne vulgaris 2015    Benign hypertension 2015    Bladder prolapse, female, acquired 2015    Diverticulosis of large intestine without hemorrhage 2015    Esophageal stricture 2015    Former smoker: 1 pack a day for 30 years, quit 3/11/10 3/17/2017    Gastroesophageal reflux disease without esophagitis 2015    Hyperlipidemia     Non morbid obesity due to excess calories 2015    Primary osteoarthritis of both knees 2015     Past Surgical History:   Procedure Laterality Date    bilateral knee surgery  2016    BREAST SURGERY      cyst removal     SECTION      x 4    KNEE SURGERY Bilateral 2016    TK    TONSILLECTOMY       Social History   Substance Use Topics    Smoking status: Former Smoker     Packs/day: 1.00     Years: 30.00     Types: Cigarettes     Quit date: 3/11/2010    Smokeless tobacco: Never Used    Alcohol use Yes      Comment: occasional     Family History   Problem Relation Age of Onset    Diabetes Mother     Heart disease Mother     Arthritis Mother     Hypertension Father     Heart disease Father      PPM    Diabetes Sister     Heart disease Sister      valve replacement    Diabetes Brother     Arthritis Brother      RA    No Known Problems Son     Cancer Maternal " "Grandmother      breast    No Known Problems Son     No Known Problems Son     No Known Problems Son      OB History    Para Term  AB SAB TAB Ectopic Multiple Living   5 4              # Outcome Date GA Lbr Teo/2nd Weight Sex Delivery Anes PTL Lv   5             4 Para            3 Para            2 Para            1 Para                   /88  Ht 5' 4" (1.626 m)  Wt 89.7 kg (197 lb 12 oz)  BMI 33.94 kg/m2    ROS:  GENERAL: No fever, chills, fatigability or weight loss.  VULVAR: + pain, no lesions and + itching.  VAGINAL: No relaxation, no itching, no discharge, no abnormal bleeding and no lesions.  ABDOMEN: No abdominal pain. Denies nausea. Denies vomiting. No diarrhea. No constipation  BREAST: Denies pain. No lumps. No discharge.  URINARY: No incontinence, no nocturia, no frequency and no dysuria.  CARDIOVASCULAR: No chest pain. No shortness of breath. No leg cramps.  NEUROLOGICAL: No headaches. No vision changes.    PHYSICAL EXAM:  VULVA: normal appearing vulva with no masses, tenderness or lesions   VAGINA: normal appearing vagina with normal color. copious amount of thick, white discharge. Appears to be yeast. no lesions   CERVIX: normal appearing cervix without discharge or lesions   UTERUS: uterus is normal size, shape, consistency and nontender   ADNEXA: normal adnexa in size, nontender and no masses    ASSESSMENT and PLAN:    ICD-10-CM ICD-9-CM    1. Vaginal yeast infection B37.3 112.1 Vaginosis Screen by DNA Probe   2. Vulvovaginal discomfort N94.89 625.9      Affirm pending  Dr. March contacted for f/u appt with Joaquim  Urine dip in clinic negative  Continue using mycolog prn  Discussed pouring cup of water over vagina with urination prn to help with dysuria.     Patient was counseled today on vaginitis prevention including :  a. avoiding feminine products such as deoderant soaps, body wash, bubble bath, douches, scented toilet paper, deoderant tampons or pads, feminine " wipes, chronic pad use, etc.  b. avoiding other vulvovaginal irritants such as long hot baths, humidity, tight, synthetic clothing, chlorine and sitting around in wet bathing suits  c. wearing cotton underwear, avoiding thong underwear and no underwear to bed  d. taking showers instead of baths and use a hair dryer on cool setting afterwards to dry  e. wearing cotton to exercise and shower immediately after exercise and change clothes  f. using polyurethane condoms without spermicide if sexually active and symptoms are triggered by intercourse    FOLLOW UP: PRN lack of improvement.

## 2017-04-17 NOTE — TELEPHONE ENCOUNTER
----- Message from Rocio Leon sent at 4/17/2017  3:23 PM CDT -----  Contact: self  Patient is requesting an appointment with Dr. March, patient states she has seen two Np's for a rash she has been having and states that neither have been able to help her, Patient can be reached at [phone].

## 2017-04-17 NOTE — TELEPHONE ENCOUNTER
Patient is requesting to see Dr March after being evaluated for vaginal itching by Urgent care with negative cultures. Patient states she has experienced symptoms for three weeks and would like to have this resolved. Patient advised I would leave the message for review

## 2017-04-18 NOTE — TELEPHONE ENCOUNTER
----- Message from Valerie Marx NP sent at 4/17/2017 10:58 AM CDT -----  Regarding: appt  Hello,   Can you please call this pt to schedule an appt for her with Dr. March? We attempted, but her scheduled is locked.     Thanks,   Valerie Marx

## 2017-04-20 DIAGNOSIS — N89.8 VAGINAL ITCHING: ICD-10-CM

## 2017-04-21 RX ORDER — NYSTATIN AND TRIAMCINOLONE ACETONIDE 100000; 1 [USP'U]/G; MG/G
CREAM TOPICAL
Qty: 1 TUBE | Refills: 0 | Status: SHIPPED | OUTPATIENT
Start: 2017-04-21 | End: 2017-09-07

## 2017-04-22 DIAGNOSIS — N89.8 VAGINAL ITCHING: ICD-10-CM

## 2017-04-24 ENCOUNTER — TELEPHONE (OUTPATIENT)
Dept: OBSTETRICS AND GYNECOLOGY | Facility: CLINIC | Age: 62
End: 2017-04-24

## 2017-04-24 NOTE — TELEPHONE ENCOUNTER
----- Message from Clara Cox sent at 4/24/2017  9:10 AM CDT -----  Contact: self  Pt needing a call back regarding an appt today, pt states she can not wait until tmrw, she can be reached at [phone].

## 2017-04-26 ENCOUNTER — PATIENT MESSAGE (OUTPATIENT)
Dept: OBSTETRICS AND GYNECOLOGY | Facility: CLINIC | Age: 62
End: 2017-04-26

## 2017-04-26 ENCOUNTER — OFFICE VISIT (OUTPATIENT)
Dept: OBSTETRICS AND GYNECOLOGY | Facility: CLINIC | Age: 62
End: 2017-04-26
Attending: OBSTETRICS & GYNECOLOGY
Payer: COMMERCIAL

## 2017-04-26 ENCOUNTER — LAB VISIT (OUTPATIENT)
Dept: LAB | Facility: OTHER | Age: 62
End: 2017-04-26
Attending: OBSTETRICS & GYNECOLOGY
Payer: COMMERCIAL

## 2017-04-26 DIAGNOSIS — N90.89 VULVAR LESION: Primary | ICD-10-CM

## 2017-04-26 DIAGNOSIS — N90.89 VULVAR LESION: ICD-10-CM

## 2017-04-26 PROCEDURE — 36415 COLL VENOUS BLD VENIPUNCTURE: CPT

## 2017-04-26 PROCEDURE — 86695 HERPES SIMPLEX TYPE 1 TEST: CPT

## 2017-04-26 PROCEDURE — 86695 HERPES SIMPLEX TYPE 1 TEST: CPT | Mod: 59

## 2017-04-26 PROCEDURE — 99999 PR PBB SHADOW E&M-EST. PATIENT-LVL I: CPT | Mod: PBBFAC,,, | Performed by: OBSTETRICS & GYNECOLOGY

## 2017-04-26 PROCEDURE — 1160F RVW MEDS BY RX/DR IN RCRD: CPT | Mod: S$GLB,,, | Performed by: OBSTETRICS & GYNECOLOGY

## 2017-04-26 PROCEDURE — 99214 OFFICE O/P EST MOD 30 MIN: CPT | Mod: S$GLB,,, | Performed by: OBSTETRICS & GYNECOLOGY

## 2017-04-26 PROCEDURE — 86696 HERPES SIMPLEX TYPE 2 TEST: CPT

## 2017-04-26 PROCEDURE — 87529 HSV DNA AMP PROBE: CPT

## 2017-04-26 RX ORDER — LIDOCAINE HYDROCHLORIDE 20 MG/ML
JELLY TOPICAL
Qty: 30 ML | Refills: 3 | Status: SHIPPED | OUTPATIENT
Start: 2017-04-26 | End: 2017-09-07

## 2017-04-26 RX ORDER — VALACYCLOVIR HYDROCHLORIDE 1 G/1
2000 TABLET, FILM COATED ORAL 2 TIMES DAILY
Qty: 20 TABLET | Status: SHIPPED | OUTPATIENT
Start: 2017-04-26 | End: 2017-05-06

## 2017-04-26 RX ORDER — NYSTATIN AND TRIAMCINOLONE ACETONIDE 100000; 1 [USP'U]/G; MG/G
CREAM TOPICAL
Qty: 1 TUBE | Refills: 2 | Status: SHIPPED | OUTPATIENT
Start: 2017-04-26 | End: 2017-09-07

## 2017-04-26 NOTE — PROGRESS NOTES
Subjective:       Patient ID: Shanda Shaffer is a 62 y.o. female.    Chief Complaint:  Vulvar Rash      History of Present Illness  HPI  This 62 yr old P4 female is here for a rash that she has had for about  A month.  She has seen NP twice and tested for yeast and has been negative but treated twice with diflucan.  Still there and burns very badly with urination.    She has not had fever  No new sexual partners and she has not had sex in 2 months.  The picture she showed me from her phone in the office today looks like herpes but time line does not fit.  We did discuss that she could have hd in the past that she just did not know about and will check her bloodwork for this.  She is  and neither she nor  have history of herpes    GYN & OB History  No LMP recorded. Patient is postmenopausal.   Date of Last Pap: 5/3/2016    OB History    Para Term  AB SAB TAB Ectopic Multiple Living   5 4              # Outcome Date GA Lbr Teo/2nd Weight Sex Delivery Anes PTL Lv   5             4 Para            3 Para            2 Para            1 Para                   Review of Systems  Review of Systems   Constitutional: Negative for chills and fever.   Respiratory: Negative for shortness of breath.    Cardiovascular: Negative for chest pain.   Gastrointestinal: Negative for abdominal pain, nausea and vomiting.   Genitourinary: Positive for genital sores and vaginal pain. Negative for difficulty urinating, dyspareunia, menstrual problem, pelvic pain, vaginal bleeding and vaginal discharge.   Skin: Negative for wound.   Hematological: Negative for adenopathy.           Objective:    Physical Exam:               Genitourinary:                         no adenopathy     Assessment:        1. Vulvar lesion               Plan:      Will treat empirically with valtrex and lidocaine gel.  Follow up with culture and labs.  Benadryl to stop from scratching at night.

## 2017-04-28 ENCOUNTER — PATIENT MESSAGE (OUTPATIENT)
Dept: OBSTETRICS AND GYNECOLOGY | Facility: CLINIC | Age: 62
End: 2017-04-28

## 2017-04-28 LAB
HSV PCR SPECIMEN SOURCE: ABNORMAL
HSV1 IGG SERPL QL IA: POSITIVE
HSV1 PCR RESULT: DETECTED
HSV2 IGG SERPL QL IA: NEGATIVE
HSV2 PCR RESULT: NOT DETECTED

## 2017-05-01 LAB — HSV1+2 IGM SER IA-ACNC: <0.9 INDEX

## 2017-05-02 ENCOUNTER — PATIENT MESSAGE (OUTPATIENT)
Dept: OBSTETRICS AND GYNECOLOGY | Facility: CLINIC | Age: 62
End: 2017-05-02

## 2017-05-02 ENCOUNTER — RESEARCH ENCOUNTER (OUTPATIENT)
Dept: RESEARCH | Facility: HOSPITAL | Age: 62
End: 2017-05-02

## 2017-05-02 NOTE — PROGRESS NOTES
[facility] Diffense Vaccine Trial  IRB# 2013.143.C  Subject # [id number]    Site received an alert from [facility] in regards to pt taking an ABX. , Radha Webb, called subject to follow-up regarding ABX. Pt stated she went to her OBGYN doctor visit on 4/26/2017 when a vulvar lesion was found. The Dr. Prescribed her valacyclovir (valtrex). After discussion, site informed pt that valtrex is not an anti-biotic, but actually is an antiviral but thanked pt for being so vigilant in reporting medications to the call center. Pt instructed to continue to complete bi-weekly telephone calls.

## 2017-05-08 ENCOUNTER — PATIENT MESSAGE (OUTPATIENT)
Dept: OBSTETRICS AND GYNECOLOGY | Facility: CLINIC | Age: 62
End: 2017-05-08

## 2017-05-21 DIAGNOSIS — Z96.653 STATUS POST TOTAL BILATERAL KNEE REPLACEMENT: ICD-10-CM

## 2017-05-24 RX ORDER — LOSARTAN POTASSIUM AND HYDROCHLOROTHIAZIDE 25; 100 MG/1; MG/1
TABLET ORAL
Qty: 90 TABLET | Refills: 0 | Status: SHIPPED | OUTPATIENT
Start: 2017-05-24 | End: 2017-07-24 | Stop reason: SDUPTHER

## 2017-05-26 DIAGNOSIS — Z96.653 STATUS POST TOTAL BILATERAL KNEE REPLACEMENT: ICD-10-CM

## 2017-05-26 RX ORDER — MELOXICAM 15 MG/1
15 TABLET ORAL DAILY
Qty: 30 TABLET | Refills: 0 | Status: SHIPPED | OUTPATIENT
Start: 2017-05-26 | End: 2017-07-03 | Stop reason: SDUPTHER

## 2017-05-29 RX ORDER — MELOXICAM 15 MG/1
TABLET ORAL
Qty: 30 TABLET | Refills: 0 | OUTPATIENT
Start: 2017-05-29

## 2017-06-07 ENCOUNTER — OFFICE VISIT (OUTPATIENT)
Dept: ORTHOPEDICS | Facility: CLINIC | Age: 62
End: 2017-06-07
Payer: COMMERCIAL

## 2017-06-07 ENCOUNTER — HOSPITAL ENCOUNTER (OUTPATIENT)
Dept: RADIOLOGY | Facility: HOSPITAL | Age: 62
Discharge: HOME | End: 2017-06-07
Attending: ORTHOPAEDIC SURGERY
Payer: COMMERCIAL

## 2017-06-07 VITALS — WEIGHT: 197.75 LBS | BODY MASS INDEX: 33.76 KG/M2 | HEIGHT: 64 IN

## 2017-06-07 DIAGNOSIS — Z96.659 PAINFUL TOTAL KNEE REPLACEMENT, SEQUELA: Primary | ICD-10-CM

## 2017-06-07 DIAGNOSIS — M25.562 CHRONIC PAIN OF LEFT KNEE: ICD-10-CM

## 2017-06-07 DIAGNOSIS — G89.29 CHRONIC PAIN OF LEFT KNEE: ICD-10-CM

## 2017-06-07 DIAGNOSIS — T84.84XS PAINFUL TOTAL KNEE REPLACEMENT, SEQUELA: Primary | ICD-10-CM

## 2017-06-07 PROCEDURE — 99213 OFFICE O/P EST LOW 20 MIN: CPT | Mod: S$GLB,,, | Performed by: ORTHOPAEDIC SURGERY

## 2017-06-07 PROCEDURE — 99999 PR PBB SHADOW E&M-EST. PATIENT-LVL III: CPT | Mod: PBBFAC,,, | Performed by: ORTHOPAEDIC SURGERY

## 2017-06-07 PROCEDURE — 77073 BONE LENGTH STUDIES: CPT | Mod: TC

## 2017-06-07 PROCEDURE — 77073 BONE LENGTH STUDIES: CPT | Mod: 26,,, | Performed by: RADIOLOGY

## 2017-06-07 RX ORDER — FLUCONAZOLE 150 MG/1
TABLET ORAL
COMMUNITY
Start: 2017-04-20 | End: 2017-09-07

## 2017-06-07 RX ORDER — VALACYCLOVIR HYDROCHLORIDE 1 G/1
TABLET, FILM COATED ORAL
COMMUNITY
Start: 2017-06-02 | End: 2017-09-07

## 2017-06-07 RX ORDER — VALACYCLOVIR HYDROCHLORIDE 1 G/1
TABLET, FILM COATED ORAL
COMMUNITY
Start: 2017-05-08 | End: 2017-06-14 | Stop reason: SDUPTHER

## 2017-06-07 RX ORDER — VALACYCLOVIR HYDROCHLORIDE 1 G/1
TABLET, FILM COATED ORAL
COMMUNITY
Start: 2017-05-21 | End: 2017-06-14 | Stop reason: SDUPTHER

## 2017-06-07 RX ORDER — VALACYCLOVIR HYDROCHLORIDE 1 G/1
TABLET, FILM COATED ORAL
COMMUNITY
Start: 2017-05-16 | End: 2017-06-14 | Stop reason: SDUPTHER

## 2017-06-07 RX ORDER — MUPIROCIN CALCIUM 20 MG/G
CREAM TOPICAL
COMMUNITY
Start: 2017-03-21 | End: 2017-09-07

## 2017-06-08 ENCOUNTER — PATIENT MESSAGE (OUTPATIENT)
Dept: ORTHOPEDICS | Facility: CLINIC | Age: 62
End: 2017-06-08

## 2017-06-08 NOTE — PROGRESS NOTES
CHIEF COMPLAINT:  Left knee pain.    HISTORY OF PRESENT ILLNESS:  The patient is in today for followup from her   bilateral knee replacements.  She is about a year and a half out.  She still has   pain in the left knee.  It is in the medial aspect of the knee.  It is somewhat   debilitating.  She has difficulty with stairs.  She has difficulty extending   the leg.  No new trauma.  No constitutional symptoms.  I reviewed her medical   history, family history, social history, medications and allergies in the   electronic medical record.    REVIEW OF SYSTEMS:  Negative except as noted.    PHYSICAL EXAMINATION:  EXTREMITIES:  Focussed examination on the left lower extremity demonstrates a   well-healed incision.  She is tender in the medial retinacular along the medial   joint line.  There is no instability.  Patella tracks well.  She has a range of   motion of 0 to approximately 130 degrees of flexion.  Extremities are   neurovascularly intact distally.    IMAGING:  Her CT scan she obtained at the end of March was reviewed.  No   abnormality is noted on the CT.  Rotation of the components look good.    Regarding the standing film today, she is in approximately 7 degrees of valgus   on the right, 9 degrees on the left.    IMPRESSION:  Persistent medial left knee pain following left total knee   arthroplasty.    PLAN:  Options were discussed.  We will try an RFA of the genicular nerves to   address the pain.  I will see her back after she has this done.  If we get good   pain relief, then I may send her back to physical therapy to work on   restrengthening as I believe this will help her.  We will get the RFA set up   with Miya.      LUIS MANUEL  dd: 06/07/2017 09:17:10 (CDT)  td: 06/07/2017 17:53:27 (CDT)  Doc ID   #[id number]  Job ID #[id number]    CC:

## 2017-06-14 ENCOUNTER — OFFICE VISIT (OUTPATIENT)
Dept: OBSTETRICS AND GYNECOLOGY | Facility: CLINIC | Age: 62
End: 2017-06-14
Attending: OBSTETRICS & GYNECOLOGY
Payer: COMMERCIAL

## 2017-06-14 VITALS
SYSTOLIC BLOOD PRESSURE: 130 MMHG | WEIGHT: 197.56 LBS | DIASTOLIC BLOOD PRESSURE: 88 MMHG | BODY MASS INDEX: 33.73 KG/M2 | HEIGHT: 64 IN

## 2017-06-14 DIAGNOSIS — Z01.419 WELL WOMAN EXAM WITH ROUTINE GYNECOLOGICAL EXAM: Primary | ICD-10-CM

## 2017-06-14 DIAGNOSIS — L02.31 CUTANEOUS ABSCESS OF BUTTOCK: ICD-10-CM

## 2017-06-14 PROCEDURE — 99396 PREV VISIT EST AGE 40-64: CPT | Mod: S$GLB,,, | Performed by: OBSTETRICS & GYNECOLOGY

## 2017-06-14 PROCEDURE — 99999 PR PBB SHADOW E&M-EST. PATIENT-LVL II: CPT | Mod: PBBFAC,,, | Performed by: OBSTETRICS & GYNECOLOGY

## 2017-06-14 PROCEDURE — 87070 CULTURE OTHR SPECIMN AEROBIC: CPT

## 2017-06-14 PROCEDURE — 87077 CULTURE AEROBIC IDENTIFY: CPT

## 2017-06-14 PROCEDURE — 87075 CULTR BACTERIA EXCEPT BLOOD: CPT

## 2017-06-14 PROCEDURE — 87076 CULTURE ANAEROBE IDENT EACH: CPT

## 2017-06-14 PROCEDURE — 87186 SC STD MICRODIL/AGAR DIL: CPT

## 2017-06-14 RX ORDER — ESTRADIOL 0.1 MG/G
1 CREAM VAGINAL DAILY
Qty: 42.5 G | Refills: 1 | Status: SHIPPED | OUTPATIENT
Start: 2017-06-14 | End: 2018-01-03

## 2017-06-14 NOTE — PROGRESS NOTES
Subjective:       Patient ID: Shanda Shaffer is a 62 y.o. female.    Chief Complaint:  Well Woman      History of Present Illness  HPI  This pt is here today for well woman exam and to follow up on newly diagnosed herpes that she had first known outbreak about 2 months ago.  Was IgG 1 pos.  She is still having a rash.  She did have cold sores in the past.  She has many questions.  She is due for colonoscopy and is delaying for this to heal.   She uses the estring but pulled until healed.   did testing but did not get the results.    GYN & OB History  No LMP recorded. Patient is postmenopausal.   Date of Last Pap: 5/3/2016    OB History    Para Term  AB Living   5 4       SAB TAB Ectopic Multiple Live Births             # Outcome Date GA Lbr Teo/2nd Weight Sex Delivery Anes PTL Lv   5             4 Para            3 Para            2 Para            1 Para                   Review of Systems  Review of Systems        Objective:    Physical Exam:   Constitutional: She is oriented to person, place, and time. She appears well-developed and well-nourished.    HENT:   Head: Normocephalic.    Eyes: EOM are normal.    Neck: Normal range of motion.    Cardiovascular: Normal rate.     Pulmonary/Chest: Effort normal. She exhibits no mass and no tenderness. Right breast exhibits no inverted nipple, no mass, no skin change and no tenderness. Left breast exhibits no inverted nipple, no mass, no skin change and no tenderness.        Abdominal: Soft. She exhibits no distension. There is no tenderness.     Genitourinary: Vagina normal and uterus normal. There is no rash, tenderness or lesion on the right labia. There is no rash, tenderness or lesion on the left labia. Uterus is not tender. Cervix is normal. Right adnexum displays no mass, no tenderness and no fullness. Left adnexum displays no mass, no tenderness and no fullness. Cervix exhibits no discharge.   Genitourinary Comments: At the upper mid  buttock almost at her back is a cutaneous ulcer that has been there for months.  The herpetic lesion on the left labia is resolved.  This ulcer appears almost decubitis in nature and not infectious.  About 2 cm x1cm along the crease of the buttocks.           Musculoskeletal: Normal range of motion.       Neurological: She is alert and oriented to person, place, and time.    Skin: Skin is warm and dry.    Psychiatric: She has a normal mood and affect.          Assessment:        1. Well woman exam with routine gynecological exam    2. Cutaneous abscess of buttock               Plan:      Cultures done of the ulcer  Herpetic lesion of the left labia is resolved (no adenopathy)  Follow up with pt and may have to call ulcer nurse and get plan.    Soak in tub no soap and blow dry with cool dryer  Do not put anything on it for now  Only cotton underwear and none at night to sleep

## 2017-06-16 ENCOUNTER — PATIENT MESSAGE (OUTPATIENT)
Dept: OBSTETRICS AND GYNECOLOGY | Facility: CLINIC | Age: 62
End: 2017-06-16

## 2017-06-16 DIAGNOSIS — L08.9 SKIN INFECTION: Primary | ICD-10-CM

## 2017-06-16 LAB — BACTERIA SPEC AEROBE CULT: NORMAL

## 2017-06-16 RX ORDER — SULFAMETHOXAZOLE AND TRIMETHOPRIM 400; 80 MG/1; MG/1
1 TABLET ORAL 2 TIMES DAILY
Qty: 20 TABLET | Refills: 0 | Status: SHIPPED | OUTPATIENT
Start: 2017-06-16 | End: 2017-06-26

## 2017-06-19 ENCOUNTER — OFFICE VISIT (OUTPATIENT)
Dept: PAIN MEDICINE | Facility: CLINIC | Age: 62
End: 2017-06-19
Payer: COMMERCIAL

## 2017-06-19 ENCOUNTER — TELEPHONE (OUTPATIENT)
Dept: PAIN MEDICINE | Facility: CLINIC | Age: 62
End: 2017-06-19

## 2017-06-19 VITALS
BODY MASS INDEX: 33.99 KG/M2 | WEIGHT: 198 LBS | SYSTOLIC BLOOD PRESSURE: 128 MMHG | HEART RATE: 74 BPM | DIASTOLIC BLOOD PRESSURE: 78 MMHG

## 2017-06-19 DIAGNOSIS — G89.29 CHRONIC PAIN OF LEFT KNEE: Primary | ICD-10-CM

## 2017-06-19 DIAGNOSIS — M25.562 CHRONIC PAIN OF LEFT KNEE: Primary | ICD-10-CM

## 2017-06-19 DIAGNOSIS — Z96.653 STATUS POST TOTAL BILATERAL KNEE REPLACEMENT: ICD-10-CM

## 2017-06-19 DIAGNOSIS — M17.12 PRIMARY OSTEOARTHRITIS OF LEFT KNEE: ICD-10-CM

## 2017-06-19 LAB — BACTERIA SPEC ANAEROBE CULT: NORMAL

## 2017-06-19 PROCEDURE — 99204 OFFICE O/P NEW MOD 45 MIN: CPT | Mod: S$GLB,,, | Performed by: ANESTHESIOLOGY

## 2017-06-19 PROCEDURE — 99999 PR PBB SHADOW E&M-EST. PATIENT-LVL III: CPT | Mod: PBBFAC,,, | Performed by: ANESTHESIOLOGY

## 2017-06-19 NOTE — LETTER
June 19, 2017      Reji Miller MD  [address]  [place] [address]           [facility]  [address]  [facility] [address]  Phone: [phone]          Patient: Shanda Shaffer   MR Number: [id number]   YOB: 1955   Date of Visit: 6/19/2017       Dear Dr. Reji Miller:    Thank you for referring Shanda Shaffer to me for evaluation. Attached you will find relevant portions of my assessment and plan of care.    If you have questions, please do not hesitate to call me. I look forward to following Shanda Shaffer along with you.    Sincerely,    Cesar Rose MD    Enclosure  CC:  No Recipients    If you would like to receive this communication electronically, please contact [email or web address] or [phone] to request more information on [email or web address] access.    For providers and/or their staff who would like to refer a patient to [facility], please contact us through our one-stop-shop provider referral line, [facility] [place], at [phone].    If you feel you have received this communication in error or would no longer like to receive these types of communications, please e-mail [email or web address]

## 2017-06-19 NOTE — PROGRESS NOTES
Chronic Pain - New Consult    Referring Physician: Rjei Miller MD    Chief Complaint:   Chief Complaint   Patient presents with    Knee Pain     referred by Dr. Miller        SUBJECTIVE:    Shanda Shaffer presents to the clinic for the evaluation of knee pain. The pain started February 2016 ago following bilateral knee replacement and symptoms have been worsening.The pain is located in the LEFT knee area.  The pain is described as aching shooting and is rated as 4/10. The pain is rated with a score of  2/10 on the BEST day and a score of 4/10 on the WORST day.  Symptoms interfere with daily activity, sleeping and work. The pain is exacerbated by Sitting, Standing, Walking, Extension and Getting out of bed/chair.  The pain is mitigated by medications. She reports spending 2 hours per day reclining. The patient reports 4-5 hours of uninterrupted sleep per night.    She had bilateral TKR by  in 2/2016. She has persistent left sided knee pian afterwards, but the right side is doing ok . She is referred to me by  for consideration of left GNB and possible RFA    Patient deniesdenies night fever/night sweats, urinary incontinence, bowel incontinence, significant weight loss, significant motor weakness and loss of sensations.    Physical Therapy/Home Exercise: yes      Pain Disability Index Review:  Last 3 PDI Scores 6/19/2017   Pain Disability Index (PDI) 28       Pain Medications:    - Opioids: Tramadol  - Adjuvant Medications: Mobic (Meloxicam), Voltaren gel  - Anti-Coagulants: None  - Others: see medications list     report:  Reviewed and consistent with medication use as prescribed.    Pain Procedures:  Bilateral Total knee replacement with Dr. Miller 2/2016     Imaging: CT Lower Extremity Without Contrast Left 3/31/17  Narrative     Findings: There is a TKA in place good alignment no complications. There is normal rotation. No fracture dislocation bone destruction loosening or  infection is seen. No joint space narrowing seen. No soft tissue masses are seen.   Impression      No significant abnormality seen.      Electronically signed by: ASHLEY MI  Date: 17  Time: 09:42           X-Ray Hip to Ankle 17  Narrative     Findings: There are bilateral TKAs in place.  There are mild genu valgus deformities.  No fracture dislocation bone destruction seen.      Electronically signed by: ASHLEY MI  Date: 17  Time: 09:01        Past Medical History:   Diagnosis Date    Acne vulgaris 2015    Benign hypertension 2015    Bladder prolapse, female, acquired 2015    Diverticulosis of large intestine without hemorrhage 2015    Esophageal stricture 2015    Former smoker: 1 pack a day for 30 years, quit 3/11/10 3/17/2017    Gastroesophageal reflux disease without esophagitis 2015    Hyperlipidemia     Non morbid obesity due to excess calories 2015    Primary osteoarthritis of both knees 2015     Past Surgical History:   Procedure Laterality Date    bilateral knee surgery  2016    BREAST SURGERY      cyst removal     SECTION      x 4    KNEE SURGERY Bilateral 2016    TK    TONSILLECTOMY       Social History     Social History    Marital status:      Spouse name: N/A    Number of children: 4    Years of education: N/A     Occupational History    Not on file.     Social History Main Topics    Smoking status: Former Smoker     Packs/day: 1.00     Years: 30.00     Types: Cigarettes     Quit date: 3/11/2010    Smokeless tobacco: Never Used    Alcohol use Yes      Comment: occasional    Drug use: No    Sexual activity: Not Currently     Other Topics Concern    Not on file     Social History Narrative    No narrative on file     Family History   Problem Relation Age of Onset    Diabetes Mother     Heart disease Mother     Arthritis Mother     Hypertension Father     Heart disease Father       PPM    Diabetes Sister     Heart disease Sister      valve replacement    Diabetes Brother     Arthritis Brother      RA    No Known Problems Son     Cancer Maternal Grandmother      breast    No Known Problems Son     No Known Problems Son     No Known Problems Son        Review of patient's allergies indicates:   Allergen Reactions    Percocet [oxycodone-acetaminophen] Nausea Only     Severe nausea with inability to eat.       Current Outpatient Prescriptions   Medication Sig    diclofenac sodium 1 % Gel APPLY 2 GRAMS TOPICALLY 4 TIMES DAILY    estradiol (ESTRACE) 0.01 % (0.1 mg/gram) vaginal cream Place 1 g vaginally once daily.    fluconazole (DIFLUCAN) 150 MG Tab     lidocaine HCL 2% (XYLOCAINE) 2 % jelly Apply to affected area  prn    losartan-hydrochlorothiazide 100-25 mg (HYZAAR) 100-25 mg per tablet TAKE 1 TABLET ONCE DAILY    meloxicam (MOBIC) 15 MG tablet Take 1 tablet (15 mg total) by mouth once daily.    metoprolol succinate (TOPROL-XL) 50 MG 24 hr tablet Take 1 tablet (50 mg total) by mouth once daily.    mupirocin calcium 2% (BACTROBAN) 2 % cream     nystatin-triamcinolone (MYCOLOG II) cream APPLY  CREAM TOPICALLY TO AFFECTED AREA TWICE DAILY    nystatin-triamcinolone (MYCOLOG II) cream APPLY  CREAM TOPICALLY TO AFFECTED AREA TWICE DAILY    PREPOPIK 10 mg-3.5 gram-12 gram PwPk     sulfamethoxazole-trimethoprim 400-80mg (BACTRIM) 400-80 mg per tablet Take 1 tablet by mouth 2 (two) times daily.    tramadol (ULTRAM) 50 mg tablet Take 1 tablet (50 mg total) by mouth every 8 (eight) hours as needed for Pain.    valacyclovir (VALTREX) 1000 MG tablet     trospium (SANCTURA) 20 mg Tab tablet Take 1 tablet (20 mg total) by mouth 2 (two) times daily.     No current facility-administered medications for this visit.        REVIEW OF SYSTEMS:    GENERAL:  No weight loss, malaise or fevers.  HEENT:  Negative for frequent or significant headaches.  NECK:  Negative for lumps, goiter, pain  and significant neck swelling.  RESPIRATORY:  Negative for cough, wheezing or shortness of breath.  CARDIOVASCULAR:  Negative for chest pain, leg swelling or palpitations.  GI:  Negative for abdominal discomfort, blood in stools or black stools or change in bowel habits.  MUSCULOSKELETAL:  See HPI.  SKIN:  Negative for lesions, rash, and itching.  PSYCH:  Negative for sleep disturbance, mood disorder and recent psychosocial stressors.  HEMATOLOGY/LYMPHOLOGY:  Negative for prolonged bleeding, bruising easily or swollen nodes.  NEURO:   No history of headaches, syncope, paralysis, seizures or tremors.  All other reviewed and negative other than HPI.    OBJECTIVE:    /78   Pulse 74   Wt 89.8 kg (198 lb)   BMI 33.99 kg/m²     PHYSICAL EXAMINATION:    General appearance: Well appearing, in no acute distress, alert and oriented x3.  Psych:  Mood and affect appropriate.  Skin: Scar of previous bilateral TKR Skin color, texture, turgor normal, no rashes or lesions, in both upper and lower body.  Head/face:  Normocephalic, atraumatic. No palpable lymph nodes.  Neck: No pain to palpation over the cervical paraspinous muscles. Spurling Negative. No pain with neck flexion, extension, or lateral flexion.   Cor: RRR  Pulm: CTA  Back: Straight leg raising in the sitting and supine positions is negative to radicular pain. No pain to palpation over the spine or costovertebral angles. Normal range of motion without pain reproduction.  Extremities: + Pain on ROM of the left knee Peripheral joint ROM is full and pain free without obvious instability or laxity in all four extremities. No deformities, edema, or skin discoloration. Good capillary refill.  Musculoskeletal: Shoulder, hip, sacroiliac and knee provocative maneuvers are negative. Bilateral upper and lower extremity strength is normal and symmetric.  No atrophy or tone abnormalities are noted.  Neuro: Bilateral upper and lower extremity coordination and muscle stretch  reflexes are physiologic and symmetric + except patellar reflex which is absent   Plantar response are downgoing. No loss of sensation is noted.  Gait: antalgic     ASSESSMENT: 62 y.o. year old female with left knee pain, consistent with knee OA and chronic knee pain .  She had bilateral TKR by  in 2/2016. She has persistent left sided knee pian afterwards, but the right side is doing ok . She is referred to me by  for consideration of left GNB and possible RFA         1. Chronic pain of left knee    2. Primary osteoarthritis of left knee    3. Status post total bilateral knee replacement 2/23/2016            PLAN:     - I have stressed the importance of physical activity and a home exercise plan to help with pain and improve health.  -Sf left genicular nerve block. Will consider RFA if needed  - RTC 3 weeks after injection  - Counseled patient regarding the importance of activity modification, constant sleeping habits and physical therapy.    The above plan and management options were discussed at length with patient. Patient is in agreement with the above and verbalized understanding. It will be communicated with the referring physician via electronic record, fax, or mail.    Cesar Rose  06/19/2017

## 2017-06-26 RX ORDER — LOSARTAN POTASSIUM AND HYDROCHLOROTHIAZIDE 25; 100 MG/1; MG/1
TABLET ORAL
Qty: 90 TABLET | Refills: 0 | Status: SHIPPED | OUTPATIENT
Start: 2017-06-26 | End: 2017-09-07 | Stop reason: SDUPTHER

## 2017-07-03 DIAGNOSIS — Z96.653 STATUS POST TOTAL BILATERAL KNEE REPLACEMENT: ICD-10-CM

## 2017-07-03 RX ORDER — MELOXICAM 15 MG/1
15 TABLET ORAL DAILY
Qty: 30 TABLET | Refills: 0 | Status: SHIPPED | OUTPATIENT
Start: 2017-07-03 | End: 2017-08-22 | Stop reason: SDUPTHER

## 2017-07-11 ENCOUNTER — TELEPHONE (OUTPATIENT)
Dept: PAIN MEDICINE | Facility: CLINIC | Age: 62
End: 2017-07-11

## 2017-07-11 NOTE — TELEPHONE ENCOUNTER
Spoke with patient regarding time of arrival for procedure that is scheduled on 7/12/17. Patient verbalized instructions and confirmed procedure date and time of arrival on 7/12/17 at 730 AM.

## 2017-07-12 ENCOUNTER — SURGERY (OUTPATIENT)
Age: 62
End: 2017-07-12

## 2017-07-12 ENCOUNTER — HOSPITAL ENCOUNTER (OUTPATIENT)
Facility: HOSPITAL | Age: 62
Discharge: HOME | End: 2017-07-12
Attending: ANESTHESIOLOGY | Admitting: ANESTHESIOLOGY
Payer: COMMERCIAL

## 2017-07-12 VITALS
OXYGEN SATURATION: 99 % | HEART RATE: 57 BPM | RESPIRATION RATE: 17 BRPM | HEIGHT: 64 IN | TEMPERATURE: 99 F | BODY MASS INDEX: 33.29 KG/M2 | DIASTOLIC BLOOD PRESSURE: 78 MMHG | WEIGHT: 195 LBS | SYSTOLIC BLOOD PRESSURE: 148 MMHG

## 2017-07-12 PROCEDURE — 25000003 PHARM REV CODE 250: Performed by: ANESTHESIOLOGY

## 2017-07-12 PROCEDURE — 64450 NJX AA&/STRD OTHER PN/BRANCH: CPT | Mod: LT,,, | Performed by: ANESTHESIOLOGY

## 2017-07-12 PROCEDURE — 63600175 PHARM REV CODE 636 W HCPCS: Performed by: ANESTHESIOLOGY

## 2017-07-12 PROCEDURE — 64450 NJX AA&/STRD OTHER PN/BRANCH: CPT | Performed by: ANESTHESIOLOGY

## 2017-07-12 RX ORDER — LIDOCAINE HYDROCHLORIDE 10 MG/ML
INJECTION INFILTRATION; PERINEURAL
Status: DISCONTINUED | OUTPATIENT
Start: 2017-07-12 | End: 2017-07-12 | Stop reason: HOSPADM

## 2017-07-12 RX ORDER — FENTANYL CITRATE 50 UG/ML
INJECTION, SOLUTION INTRAMUSCULAR; INTRAVENOUS
Status: DISCONTINUED | OUTPATIENT
Start: 2017-07-12 | End: 2017-07-12 | Stop reason: HOSPADM

## 2017-07-12 RX ORDER — MIDAZOLAM HYDROCHLORIDE 1 MG/ML
INJECTION, SOLUTION INTRAMUSCULAR; INTRAVENOUS
Status: DISCONTINUED | OUTPATIENT
Start: 2017-07-12 | End: 2017-07-12 | Stop reason: HOSPADM

## 2017-07-12 RX ORDER — BUPIVACAINE HYDROCHLORIDE 2.5 MG/ML
INJECTION, SOLUTION EPIDURAL; INFILTRATION; INTRACAUDAL
Status: DISCONTINUED | OUTPATIENT
Start: 2017-07-12 | End: 2017-07-12 | Stop reason: HOSPADM

## 2017-07-12 RX ORDER — TRIAMCINOLONE ACETONIDE 40 MG/ML
INJECTION, SUSPENSION INTRA-ARTICULAR; INTRAMUSCULAR
Status: DISCONTINUED | OUTPATIENT
Start: 2017-07-12 | End: 2017-07-12 | Stop reason: HOSPADM

## 2017-07-12 RX ORDER — SODIUM CHLORIDE, SODIUM LACTATE, POTASSIUM CHLORIDE, CALCIUM CHLORIDE 600; 310; 30; 20 MG/100ML; MG/100ML; MG/100ML; MG/100ML
INJECTION, SOLUTION INTRAVENOUS CONTINUOUS
Status: DISCONTINUED | OUTPATIENT
Start: 2017-07-12 | End: 2017-07-12 | Stop reason: HOSPADM

## 2017-07-12 RX ADMIN — FENTANYL CITRATE 50 MCG: 50 INJECTION, SOLUTION INTRAMUSCULAR; INTRAVENOUS at 08:07

## 2017-07-12 RX ADMIN — TRIAMCINOLONE ACETONIDE 40 MG: 40 INJECTION, SUSPENSION INTRA-ARTICULAR; INTRAMUSCULAR at 09:07

## 2017-07-12 RX ADMIN — MIDAZOLAM 2 MG: 1 INJECTION INTRAMUSCULAR; INTRAVENOUS at 08:07

## 2017-07-12 RX ADMIN — LIDOCAINE HYDROCHLORIDE 5 ML: 10 INJECTION, SOLUTION INFILTRATION; PERINEURAL at 09:07

## 2017-07-12 RX ADMIN — BUPIVACAINE HYDROCHLORIDE 5 ML: 2.5 INJECTION, SOLUTION EPIDURAL; INFILTRATION; INTRACAUDAL; PERINEURAL at 09:07

## 2017-07-12 RX ADMIN — SODIUM CHLORIDE, SODIUM LACTATE, POTASSIUM CHLORIDE, AND CALCIUM CHLORIDE: .6; .31; .03; .02 INJECTION, SOLUTION INTRAVENOUS at 08:07

## 2017-07-12 RX ADMIN — FENTANYL CITRATE 50 MCG: 50 INJECTION, SOLUTION INTRAMUSCULAR; INTRAVENOUS at 09:07

## 2017-07-12 NOTE — DISCHARGE INSTRUCTIONS
Home Care Instructions Pain Management:    1.  DIET:    You may resume your normal diet today.    2.  BATHING:    You may shower with luke warm water.    3.  DRESSING:    You may remove your bandage today.    4.  ACTIVITY LEVEL:      You may resume your normal activities 24 hours after your procedure.    5.  MEDICATIONS:    You may resume your normal medications today.    6.  SPECIAL INSTRUCTIONS:    No heat to the injection site for 24 hours including bath or shower, heating pad, moist heat or hot tubs.    Use an ice pack to the injection site for any pain or discomfort.  Apply ice packs for 20 minute intervals as needed.    If you have received any sedatives by mouth today, you can not drive for 12 hours.    If you have received sedation through an IV, you can not drive for 24 hours.    PLEASE CALL YOUR DOCTOR FOR THE FOLLOWIN.  Redness or swelling around the injection site.  2.  Fever of 101 degrees.  3.  Drainage (pus) from the injection site.  4.  For any continuous bleeding (some dried blood over the incision is normal.)    FOR EMERGENCIES:    If any unusual problems or difficulties occur during clinic hours, call [phone] or dial [phone].    Follow up with with your physician in 2-3 weeks.

## 2017-07-12 NOTE — OP NOTE
atient Name: Shanda Shaffer  MRN: [id number]    INFORMED CONSENT: The procedure, risks, benefits and options were discussed with patient. There are no contraindications to the procedure. The patient expressed understanding and agreed to proceed. The personnel performing the procedure was discussed. I verify that I personally obtained Shanda's consent prior to the start of the procedure and the signed consent can be found on the patient's chart.        Procedure Date: 7/12/2017  Anesthesia:   systemic  Pre Procedure diagnosis:  Chronic knee pain  Post-Procedure diagnosis:  Same    Sedation: Yes - Fentanyl 100 mcg and Midazolam 2 mg    PROCEDURE: left GENICULAR NERVE BLOCK  DESCRIPTION OF PROCEDURE: The patient was brought to the fluoroscopy suite.  IV access was obtained prior to the procedure.  The patient was positioned supine  on the fluoroscopy table.  Continuous hemodynamic monitoring was initiated including blood pressure, EKG, and pulse oximetry.    The area of the around the knee joint was prepped with chlorhexidine three times and draped into a sterile field.  Skin anesthesia was achieved using Lidocaine 1% over the respective injection sites. A 22 gauge 3.5 inch spinal needle was slowly inserted and advanced to the junction of the lateral femoral and the epicondyle while contacting periosteum to block the superior lateral genicular nerve using  the AP and lateral fluoroscopic imaging. Same process was repeated at the junction of the medial femoral shaft and the epicondyle to block the superior medial genicular nerve. The same process was repeated again using a 3rd needle which was advanced at the junction of the medial tibial plateau and the epicondyle to block the inferior medial genicular nerve. Using AP fluoroscopy the position of all 3 needles was confirmed. Afterwards lateral fluoroscopic images were obtained and the needles were adjusted to lay in the middle of the diaphysis.Negative aspiration for  blood. . A combination of 2cc of Bupivacaine 0.25% and 13mg of kenalog was injected at each needle location to block all targeted nerves.. There was no pain on injection. The needle was removed and bleeding was nil.  A sterile dressing was applied. No specimens collected. PATIENT was taken to the Post-block Recovery Area for further observation.   Blood Loss: Nill  Specimen: None    Pre Procedure Pain Level: 9/10  Post-Procedure Pain Level: 0/10        Discharge Diagnosis: Same as Pre and Post Procedure  Condition on Discharge: Stable.  Diet on Discharge: Same as before.  Activity: as per instruction sheet.  Discharge to: Home with a responsible adult.  Follow up: as per Discharge instructions

## 2017-07-12 NOTE — H&P (VIEW-ONLY)
Chronic Pain - New Consult    Referring Physician: Reji Miller MD    Chief Complaint:   Chief Complaint   Patient presents with    Knee Pain     referred by Dr. Miller        SUBJECTIVE:    Shanda Shaffer presents to the clinic for the evaluation of knee pain. The pain started February 2016 ago following bilateral knee replacement and symptoms have been worsening.The pain is located in the LEFT knee area.  The pain is described as aching shooting and is rated as 4/10. The pain is rated with a score of  2/10 on the BEST day and a score of 4/10 on the WORST day.  Symptoms interfere with daily activity, sleeping and work. The pain is exacerbated by Sitting, Standing, Walking, Extension and Getting out of bed/chair.  The pain is mitigated by medications. She reports spending 2 hours per day reclining. The patient reports 4-5 hours of uninterrupted sleep per night.    She had bilateral TKR by  in 2/2016. She has persistent left sided knee pian afterwards, but the right side is doing ok . She is referred to me by  for consideration of left GNB and possible RFA    Patient deniesdenies night fever/night sweats, urinary incontinence, bowel incontinence, significant weight loss, significant motor weakness and loss of sensations.    Physical Therapy/Home Exercise: yes      Pain Disability Index Review:  Last 3 PDI Scores 6/19/2017   Pain Disability Index (PDI) 28       Pain Medications:    - Opioids: Tramadol  - Adjuvant Medications: Mobic (Meloxicam), Voltaren gel  - Anti-Coagulants: None  - Others: see medications list     report:  Reviewed and consistent with medication use as prescribed.    Pain Procedures:  Bilateral Total knee replacement with Dr. Miller 2/2016     Imaging: CT Lower Extremity Without Contrast Left 3/31/17  Narrative     Findings: There is a TKA in place good alignment no complications. There is normal rotation. No fracture dislocation bone destruction loosening or  infection is seen. No joint space narrowing seen. No soft tissue masses are seen.   Impression      No significant abnormality seen.      Electronically signed by: ASHLEY MI  Date: 17  Time: 09:42           X-Ray Hip to Ankle 17  Narrative     Findings: There are bilateral TKAs in place.  There are mild genu valgus deformities.  No fracture dislocation bone destruction seen.      Electronically signed by: ASHLEY MI  Date: 17  Time: 09:01        Past Medical History:   Diagnosis Date    Acne vulgaris 2015    Benign hypertension 2015    Bladder prolapse, female, acquired 2015    Diverticulosis of large intestine without hemorrhage 2015    Esophageal stricture 2015    Former smoker: 1 pack a day for 30 years, quit 3/11/10 3/17/2017    Gastroesophageal reflux disease without esophagitis 2015    Hyperlipidemia     Non morbid obesity due to excess calories 2015    Primary osteoarthritis of both knees 2015     Past Surgical History:   Procedure Laterality Date    bilateral knee surgery  2016    BREAST SURGERY      cyst removal     SECTION      x 4    KNEE SURGERY Bilateral 2016    TK    TONSILLECTOMY       Social History     Social History    Marital status:      Spouse name: N/A    Number of children: 4    Years of education: N/A     Occupational History    Not on file.     Social History Main Topics    Smoking status: Former Smoker     Packs/day: 1.00     Years: 30.00     Types: Cigarettes     Quit date: 3/11/2010    Smokeless tobacco: Never Used    Alcohol use Yes      Comment: occasional    Drug use: No    Sexual activity: Not Currently     Other Topics Concern    Not on file     Social History Narrative    No narrative on file     Family History   Problem Relation Age of Onset    Diabetes Mother     Heart disease Mother     Arthritis Mother     Hypertension Father     Heart disease Father       PPM    Diabetes Sister     Heart disease Sister      valve replacement    Diabetes Brother     Arthritis Brother      RA    No Known Problems Son     Cancer Maternal Grandmother      breast    No Known Problems Son     No Known Problems Son     No Known Problems Son        Review of patient's allergies indicates:   Allergen Reactions    Percocet [oxycodone-acetaminophen] Nausea Only     Severe nausea with inability to eat.       Current Outpatient Prescriptions   Medication Sig    diclofenac sodium 1 % Gel APPLY 2 GRAMS TOPICALLY 4 TIMES DAILY    estradiol (ESTRACE) 0.01 % (0.1 mg/gram) vaginal cream Place 1 g vaginally once daily.    fluconazole (DIFLUCAN) 150 MG Tab     lidocaine HCL 2% (XYLOCAINE) 2 % jelly Apply to affected area  prn    losartan-hydrochlorothiazide 100-25 mg (HYZAAR) 100-25 mg per tablet TAKE 1 TABLET ONCE DAILY    meloxicam (MOBIC) 15 MG tablet Take 1 tablet (15 mg total) by mouth once daily.    metoprolol succinate (TOPROL-XL) 50 MG 24 hr tablet Take 1 tablet (50 mg total) by mouth once daily.    mupirocin calcium 2% (BACTROBAN) 2 % cream     nystatin-triamcinolone (MYCOLOG II) cream APPLY  CREAM TOPICALLY TO AFFECTED AREA TWICE DAILY    nystatin-triamcinolone (MYCOLOG II) cream APPLY  CREAM TOPICALLY TO AFFECTED AREA TWICE DAILY    PREPOPIK 10 mg-3.5 gram-12 gram PwPk     sulfamethoxazole-trimethoprim 400-80mg (BACTRIM) 400-80 mg per tablet Take 1 tablet by mouth 2 (two) times daily.    tramadol (ULTRAM) 50 mg tablet Take 1 tablet (50 mg total) by mouth every 8 (eight) hours as needed for Pain.    valacyclovir (VALTREX) 1000 MG tablet     trospium (SANCTURA) 20 mg Tab tablet Take 1 tablet (20 mg total) by mouth 2 (two) times daily.     No current facility-administered medications for this visit.        REVIEW OF SYSTEMS:    GENERAL:  No weight loss, malaise or fevers.  HEENT:  Negative for frequent or significant headaches.  NECK:  Negative for lumps, goiter, pain  and significant neck swelling.  RESPIRATORY:  Negative for cough, wheezing or shortness of breath.  CARDIOVASCULAR:  Negative for chest pain, leg swelling or palpitations.  GI:  Negative for abdominal discomfort, blood in stools or black stools or change in bowel habits.  MUSCULOSKELETAL:  See HPI.  SKIN:  Negative for lesions, rash, and itching.  PSYCH:  Negative for sleep disturbance, mood disorder and recent psychosocial stressors.  HEMATOLOGY/LYMPHOLOGY:  Negative for prolonged bleeding, bruising easily or swollen nodes.  NEURO:   No history of headaches, syncope, paralysis, seizures or tremors.  All other reviewed and negative other than HPI.    OBJECTIVE:    /78   Pulse 74   Wt 89.8 kg (198 lb)   BMI 33.99 kg/m²     PHYSICAL EXAMINATION:    General appearance: Well appearing, in no acute distress, alert and oriented x3.  Psych:  Mood and affect appropriate.  Skin: Scar of previous bilateral TKR Skin color, texture, turgor normal, no rashes or lesions, in both upper and lower body.  Head/face:  Normocephalic, atraumatic. No palpable lymph nodes.  Neck: No pain to palpation over the cervical paraspinous muscles. Spurling Negative. No pain with neck flexion, extension, or lateral flexion.   Cor: RRR  Pulm: CTA  Back: Straight leg raising in the sitting and supine positions is negative to radicular pain. No pain to palpation over the spine or costovertebral angles. Normal range of motion without pain reproduction.  Extremities: + Pain on ROM of the left knee Peripheral joint ROM is full and pain free without obvious instability or laxity in all four extremities. No deformities, edema, or skin discoloration. Good capillary refill.  Musculoskeletal: Shoulder, hip, sacroiliac and knee provocative maneuvers are negative. Bilateral upper and lower extremity strength is normal and symmetric.  No atrophy or tone abnormalities are noted.  Neuro: Bilateral upper and lower extremity coordination and muscle stretch  reflexes are physiologic and symmetric + except patellar reflex which is absent   Plantar response are downgoing. No loss of sensation is noted.  Gait: antalgic     ASSESSMENT: 62 y.o. year old female with left knee pain, consistent with knee OA and chronic knee pain .  She had bilateral TKR by  in 2/2016. She has persistent left sided knee pian afterwards, but the right side is doing ok . She is referred to me by  for consideration of left GNB and possible RFA         1. Chronic pain of left knee    2. Primary osteoarthritis of left knee    3. Status post total bilateral knee replacement 2/23/2016            PLAN:     - I have stressed the importance of physical activity and a home exercise plan to help with pain and improve health.  -Sf left genicular nerve block. Will consider RFA if needed  - RTC 3 weeks after injection  - Counseled patient regarding the importance of activity modification, constant sleeping habits and physical therapy.    The above plan and management options were discussed at length with patient. Patient is in agreement with the above and verbalized understanding. It will be communicated with the referring physician via electronic record, fax, or mail.    Cesar Rose  06/19/2017

## 2017-07-24 ENCOUNTER — OFFICE VISIT (OUTPATIENT)
Dept: ORTHOPEDICS | Facility: CLINIC | Age: 62
End: 2017-07-24
Payer: COMMERCIAL

## 2017-07-24 ENCOUNTER — TELEPHONE (OUTPATIENT)
Dept: INTERNAL MEDICINE | Facility: CLINIC | Age: 62
End: 2017-07-24

## 2017-07-24 VITALS — WEIGHT: 195 LBS | BODY MASS INDEX: 33.29 KG/M2 | HEIGHT: 64 IN

## 2017-07-24 DIAGNOSIS — M25.562 CHRONIC PAIN OF LEFT KNEE: ICD-10-CM

## 2017-07-24 DIAGNOSIS — T84.84XS PAINFUL TOTAL KNEE REPLACEMENT, SEQUELA: Primary | ICD-10-CM

## 2017-07-24 DIAGNOSIS — G89.29 CHRONIC PAIN OF LEFT KNEE: ICD-10-CM

## 2017-07-24 DIAGNOSIS — Z96.659 PAINFUL TOTAL KNEE REPLACEMENT, SEQUELA: Primary | ICD-10-CM

## 2017-07-24 PROCEDURE — 99213 OFFICE O/P EST LOW 20 MIN: CPT | Mod: S$GLB,,, | Performed by: ORTHOPAEDIC SURGERY

## 2017-07-24 PROCEDURE — 99999 PR PBB SHADOW E&M-EST. PATIENT-LVL II: CPT | Mod: PBBFAC,,, | Performed by: ORTHOPAEDIC SURGERY

## 2017-07-24 RX ORDER — LOSARTAN POTASSIUM AND HYDROCHLOROTHIAZIDE 25; 100 MG/1; MG/1
1 TABLET ORAL DAILY
Qty: 90 TABLET | Refills: 2 | Status: SHIPPED | OUTPATIENT
Start: 2017-07-24 | End: 2018-01-03

## 2017-07-24 NOTE — PROGRESS NOTES
"Subjective:      Patient ID: Shadna Shaffer is a 62 y.o. female.    Chief Complaint: Pain of the Right Knee and Pain of the Left Knee    HPI  Shanda Shaffer has left knee pain.  The pain has improved since the trial RFA.  She is scheduled for the f/u with them this Thursday.. The pain is located in the medial aspect of the knee and is mild.  There  is not radiation. There is not associated stiffness.   There is not catching and locking. The pain is described as achy. The pain is aggravated by walking and stair climbing.  It is alleviated by rest.  There is not associated back pain.  Her history, medications and problem list were reviewed.    Review of Systems   Constitution: Negative for chills, fever and night sweats.   HENT: Negative for headaches and hearing loss.    Eyes: Negative for blurred vision and double vision.   Cardiovascular: Negative for chest pain, claudication and leg swelling.   Respiratory: Negative for shortness of breath.    Endocrine: Negative for polydipsia, polyphagia and polyuria.   Hematologic/Lymphatic: Negative for adenopathy and bleeding problem. Does not bruise/bleed easily.   Skin: Negative for poor wound healing.   Musculoskeletal: Positive for joint pain.   Gastrointestinal: Negative for diarrhea and heartburn.   Genitourinary: Negative for bladder incontinence.   Neurological: Negative for focal weakness, numbness, paresthesias and sensory change.   Psychiatric/Behavioral: The patient is not nervous/anxious.    Allergic/Immunologic: Negative for persistent infections.         Objective:      Body mass index is 33.47 kg/m².  Vitals:    07/24/17 1316   Weight: 88.5 kg (195 lb)   Height: 5' 4" (1.626 m)           General    Constitutional: She is oriented to person, place, and time. She appears well-developed and well-nourished.   HENT:   Head: Normocephalic and atraumatic.   Eyes: EOM are normal.   Cardiovascular: Normal rate.    Pulmonary/Chest: Effort normal.   Neurological: She is " alert and oriented to person, place, and time.   Psychiatric: She has a normal mood and affect. Her behavior is normal.     General Musculoskeletal Exam   Gait: normal       Right Knee Exam     Inspection   Erythema: absent  Scars: absent  Swelling: absent  Effusion: effusion  Deformity: deformity  Bruising: absent    Tenderness   The patient is experiencing no tenderness.         Range of Motion   Extension: 0   Flexion: 130     Tests   Ligament Examination Lachman: normal (-1 to 2mm)   MCL - Valgus: normal (0 to 2mm)  LCL - Varus: normal  Patella   Passive Patellar Tilt: neutral    Other   Sensation: normal    Left Knee Exam     Inspection   Erythema: absent  Scars: absent  Swelling: absent  Effusion: absent  Deformity: deformity  Bruising: absent    Tenderness   The patient tender to palpation of the medial retinaculum.    Range of Motion   Extension: 0 (Extensor lag is improved)   Flexion: 130     Tests   Stability Lachman: normal (-1 to 2mm)   MCL - Valgus: normal (0 to 2mm)  LCL - Varus: normal (0 to 2mm)  Patella   Passive Patellar Tilt: neutral    Other   Sensation: normal    Muscle Strength   Right Lower Extremity   Hip Abduction: 5/5   Quadriceps:  5/5   Hamstrin/5   Left Lower Extremity   Hip Abduction: 5/5   Quadriceps:  5/5   Hamstrin/5     Reflexes     Left Side  Quadriceps:  2+    Right Side   Quadriceps:  2+    Vascular Exam     Right Pulses  Dorsalis Pedis:      2+          Left Pulses  Dorsalis Pedis:      2+          Edema  Right Lower Leg: absent  Left Lower Leg: absent              Assessment:       Encounter Diagnoses   Name Primary?    Painful total knee replacement, sequela Yes    Chronic pain of left knee           Plan:       Shanda was seen today for pain and pain.    Diagnoses and all orders for this visit:    Painful total knee replacement, sequela    Chronic pain of left knee          I will see her back three weeks after the permanent RFA.  Will most likely schedule PT if  she is doing well as expected

## 2017-07-24 NOTE — TELEPHONE ENCOUNTER
----- Message from Bessy Dee sent at 7/24/2017  9:53 AM CDT -----  Contact: Nicko [phone] ref # [id number]  You wrote a rx 5/24/17 losartan/hctz 100/25mg tabs and this rx was sent to [facility] home delivery. Rx was filled and sent but it has been lost in delivery so they need a new rx sent so they can resend.    [facility] [phone] fax #

## 2017-07-26 NOTE — PROGRESS NOTES
Chronic patient Established Note (Follow up visit)      SUBJECTIVE:    Shanda Shaffer presents to the clinic for a 2 wk follow-up appointment for left knee pain. She is S/P left GENICULAR NERVE BLOCK on 7/12/17 with 70% relief for 5-6 days. Previous imaging was reviewed and discussed with the patient today, discussed that we will s/f a Left knee GN RFA to help with longer relief. Pt agrees and understands. Since the last visit, Shanda Shaffer states the pain has been stable. Current pain intensity is 4/10.    Pain Disability Index Review:  Last 3 PDI Scores 7/27/2017 6/19/2017   Pain Disability Index (PDI) 28 28       Pain Medications:     - Opioids: Tramadol  - Adjuvant Medications: Mobic (Meloxicam), Voltaren gel  - Anti-Coagulants: None  - Others: see medications list    Opioid Contract: no     report:  Reviewed and consistent with medication use as prescribed.    Pain Procedures: 7/12/17 left GENICULAR NERVE BLOCK  Bilateral Total knee replacement with Dr. Miller 2/2016    Physical Therapy/Home Exercise: no    Imaging: CT Lower Extremity Without Contrast Left 3/31/17  Narrative      Findings: There is a TKA in place good alignment no complications. There is normal rotation. No fracture dislocation bone destruction loosening or infection is seen. No joint space narrowing seen. No soft tissue masses are seen.   Impression       No significant abnormality seen.      Electronically signed by: ASHLEY MI  Date: 04/01/17  Time: 09:42              X-Ray Hip to Ankle 6/7/17  Narrative      Findings: There are bilateral TKAs in place.  There are mild genu valgus deformities.  No fracture dislocation bone destruction seen.      Electronically signed by: ASHLEY MI  Date: 06/07/17  Time: 09:01          Allergies:   Review of patient's allergies indicates:   Allergen Reactions    Percocet [oxycodone-acetaminophen] Nausea Only     Severe nausea with inability to eat.       Current Medications:   Current  Outpatient Prescriptions   Medication Sig Dispense Refill    diclofenac sodium 1 % Gel APPLY 2 GRAMS TOPICALLY 4 TIMES DAILY 1 Tube 2    estradiol (ESTRACE) 0.01 % (0.1 mg/gram) vaginal cream Place 1 g vaginally once daily. 42.5 g 1    fluconazole (DIFLUCAN) 150 MG Tab       lidocaine HCL 2% (XYLOCAINE) 2 % jelly Apply to affected area  prn 30 mL 3    losartan-hydrochlorothiazide 100-25 mg (HYZAAR) 100-25 mg per tablet TAKE 1 TABLET ONCE DAILY 90 tablet 0    losartan-hydrochlorothiazide 100-25 mg (HYZAAR) 100-25 mg per tablet Take 1 tablet by mouth once daily. 90 tablet 2    meloxicam (MOBIC) 15 MG tablet Take 1 tablet (15 mg total) by mouth once daily. 30 tablet 0    metoprolol succinate (TOPROL-XL) 50 MG 24 hr tablet Take 1 tablet (50 mg total) by mouth once daily. 90 tablet 3    mupirocin calcium 2% (BACTROBAN) 2 % cream       nystatin-triamcinolone (MYCOLOG II) cream APPLY  CREAM TOPICALLY TO AFFECTED AREA TWICE DAILY 1 Tube 0    nystatin-triamcinolone (MYCOLOG II) cream APPLY  CREAM TOPICALLY TO AFFECTED AREA TWICE DAILY 1 Tube 2    PREPOPIK 10 mg-3.5 gram-12 gram PwPk       tramadol (ULTRAM) 50 mg tablet Take 1 tablet (50 mg total) by mouth every 8 (eight) hours as needed for Pain. 60 tablet 0    valacyclovir (VALTREX) 1000 MG tablet       trospium (SANCTURA) 20 mg Tab tablet Take 1 tablet (20 mg total) by mouth 2 (two) times daily. 180 tablet 3     No current facility-administered medications for this visit.        REVIEW OF SYSTEMS:    GENERAL:  No weight loss, malaise or fevers.  HEENT:  Negative for frequent or significant headaches.  NECK:  Negative for lumps, goiter, pain and significant neck swelling.  RESPIRATORY:  Negative for cough, wheezing or shortness of breath.  CARDIOVASCULAR:  Negative for chest pain, leg swelling or palpitations.  GI:  Negative for abdominal discomfort, blood in stools or black stools or change in bowel habits.  MUSCULOSKELETAL:  See HPI.  SKIN:  Negative for  lesions, rash, and itching.  PSYCH:  Negative for sleep disturbance, mood disorder and recent psychosocial stressors.  HEMATOLOGY/LYMPHOLOGY:  Negative for prolonged bleeding, bruising easily or swollen nodes.  NEURO:   No history of headaches, syncope, paralysis, seizures or tremors.  All other reviewed and negative other than HPI.     Past Medical History:  Past Medical History:   Diagnosis Date    Acne vulgaris 2015    Benign hypertension 2015    Bladder prolapse, female, acquired 2015    Diverticulosis of large intestine without hemorrhage 2015    Esophageal stricture 2015    Former smoker: 1 pack a day for 30 years, quit 3/11/10 3/17/2017    Gastroesophageal reflux disease without esophagitis 2015    Hyperlipidemia     Non morbid obesity due to excess calories 2015    Primary osteoarthritis of both knees 2015       Past Surgical History:  Past Surgical History:   Procedure Laterality Date    bilateral knee surgery  2016    BREAST SURGERY      cyst removal     SECTION      x 4    KNEE SURGERY Bilateral 2016    TK    TONSILLECTOMY         Family History:  Family History   Problem Relation Age of Onset    Diabetes Mother     Heart disease Mother     Arthritis Mother     Hypertension Father     Heart disease Father      PPM    Diabetes Sister     Heart disease Sister      valve replacement    Diabetes Brother     Arthritis Brother      RA    No Known Problems Son     Cancer Maternal Grandmother      breast    No Known Problems Son     No Known Problems Son     No Known Problems Son        Social History:  Social History     Social History    Marital status:      Spouse name: N/A    Number of children: 4    Years of education: N/A     Social History Main Topics    Smoking status: Former Smoker     Packs/day: 1.00     Years: 30.00     Types: Cigarettes     Quit date: 3/11/2010    Smokeless tobacco: Never Used     Alcohol use Yes      Comment: occasional    Drug use: No    Sexual activity: Not Currently     Other Topics Concern    None     Social History Narrative    None       OBJECTIVE:    BP (!) 172/75   Pulse 63   Wt 89.5 kg (197 lb 5 oz)   BMI 33.87 kg/m²     PHYSICAL EXAMINATION:    General appearance: Well appearing, in no acute distress, alert and oriented x3.  Psych:  Mood and affect appropriate.  Skin: Scar of previous bilateral TKR Skin color, texture, turgor normal, no rashes or lesions, in both upper and lower body.  Head/face:  Normocephalic, atraumatic. No palpable lymph nodes.  Neck: No pain to palpation over the cervical paraspinous muscles. Spurling Negative. No pain with neck flexion, extension, or lateral flexion.   Cor: RRR  Pulm: CTA  Back: Straight leg raising in the sitting and supine positions is negative to radicular pain. No pain to palpation over the spine or costovertebral angles. Normal range of motion without pain reproduction.  Extremities: + Pain on ROM of the left knee Peripheral joint ROM is full and pain free without obvious instability or laxity in all four extremities. No deformities, edema, or skin discoloration. Good capillary refill.  Musculoskeletal: Shoulder, hip, sacroiliac and knee provocative maneuvers are negative. Bilateral upper and lower extremity strength is normal and symmetric.  No atrophy or tone abnormalities are noted.  Neuro: Bilateral upper and lower extremity coordination and muscle stretch reflexes are physiologic and symmetric + except patellar reflex which is absent   Plantar response are downgoing. No loss of sensation is noted.  Gait: antalgic       ASSESSMENT: 62 y.o. year old female with left knee pain, consistent with knee OA and chronic knee pain .  She had bilateral TKR by  in 2/2016. She has persistent left sided knee pian afterwards, but the right side is doing ok . She is referred to me by  for consideration of left GNB  and possible RFA        1. Chronic pain of left knee     2. Primary osteoarthritis of left knee           PLAN:     - I have stressed the importance of physical activity and a home exercise plan to help with pain and improve health.  - Patient can continue with medications for now since they are providing benefits, using them appropriately, and without side effects.  - Counseled patient regarding the importance of activity modification, constant sleeping habits and physical therapy.  - S/F a Left knee GN RFA   -I have explained the risks, benefits, and alternatives of the procedure in detail. The patient voices understanding and all questions have been answered. The patient agrees to proceed as planned. Written Consent obtained.   -The above plan and management options were discussed at length with patient. Patient is in agreement with the above and verbalized understanding. Dr. Rose   was consulted on this patient  and agrees with this plan.    ASIM Gallego    07/27/2017

## 2017-07-27 ENCOUNTER — OFFICE VISIT (OUTPATIENT)
Dept: PAIN MEDICINE | Facility: CLINIC | Age: 62
End: 2017-07-27
Payer: COMMERCIAL

## 2017-07-27 VITALS
DIASTOLIC BLOOD PRESSURE: 75 MMHG | SYSTOLIC BLOOD PRESSURE: 172 MMHG | HEART RATE: 63 BPM | WEIGHT: 197.31 LBS | BODY MASS INDEX: 33.87 KG/M2

## 2017-07-27 DIAGNOSIS — M25.562 CHRONIC PAIN OF LEFT KNEE: Primary | ICD-10-CM

## 2017-07-27 DIAGNOSIS — G89.29 CHRONIC PAIN OF LEFT KNEE: Primary | ICD-10-CM

## 2017-07-27 DIAGNOSIS — M17.12 PRIMARY OSTEOARTHRITIS OF LEFT KNEE: ICD-10-CM

## 2017-07-27 PROCEDURE — 99999 PR PBB SHADOW E&M-EST. PATIENT-LVL III: CPT | Mod: PBBFAC,,, | Performed by: NURSE PRACTITIONER

## 2017-07-27 PROCEDURE — 99213 OFFICE O/P EST LOW 20 MIN: CPT | Mod: S$GLB,,, | Performed by: NURSE PRACTITIONER

## 2017-07-28 ENCOUNTER — RESEARCH ENCOUNTER (OUTPATIENT)
Dept: RESEARCH | Facility: HOSPITAL | Age: 62
End: 2017-07-28

## 2017-07-28 NOTE — PROGRESS NOTES
[employer]. Diffense Vaccine Trial  Protocol [id number]/ IRB# 2013.143.C  Subject #[id number]     Subject contacted by , Radha Webb, to inform the patient the study has now re-opened. After performing an in-depth investigation, the sponsor found that the Cdiffense study vaccination was not the cause of the heart events. Many of the participants in the trial already have heart disease before joining the trial. With this decision by the committee use of the vaccine can continue. Patient notified of sponsor letter being sent via certified mail and reminded to call site if significant changes in health especially related to any cardiac events. Site thanked patient for continued participation.     Site informed patient of new stool kit being delivered to residence today (7/28/2017) via [employer]  due to current stool kit expiring. Subject advised to discard of expiring stool kit once new stool kit has been received and to call site as soon as possible if doesn't receive stool kit within the day. Patient re-educated on how and when to use stool kit and to call site if have any questions. Stool kit expires 8/2018. Job # [id number].     Patient also informed of compensation for completed Obvious phone calls being uploaded to [employer] for last quarter.

## 2017-08-13 ENCOUNTER — OFFICE VISIT (OUTPATIENT)
Dept: URGENT CARE | Facility: CLINIC | Age: 62
End: 2017-08-13
Payer: COMMERCIAL

## 2017-08-13 VITALS
HEIGHT: 60 IN | TEMPERATURE: 98 F | WEIGHT: 190 LBS | BODY MASS INDEX: 37.3 KG/M2 | SYSTOLIC BLOOD PRESSURE: 159 MMHG | DIASTOLIC BLOOD PRESSURE: 119 MMHG | OXYGEN SATURATION: 100 % | RESPIRATION RATE: 18 BRPM | HEART RATE: 56 BPM

## 2017-08-13 DIAGNOSIS — R09.89 ALTERED TISSUE PERFUSION: Primary | ICD-10-CM

## 2017-08-13 DIAGNOSIS — M79.644 FINGER PAIN, RIGHT: ICD-10-CM

## 2017-08-13 PROCEDURE — 3008F BODY MASS INDEX DOCD: CPT | Mod: S$GLB,,, | Performed by: FAMILY MEDICINE

## 2017-08-13 PROCEDURE — 3080F DIAST BP >= 90 MM HG: CPT | Mod: S$GLB,,, | Performed by: FAMILY MEDICINE

## 2017-08-13 PROCEDURE — 99214 OFFICE O/P EST MOD 30 MIN: CPT | Mod: S$GLB,,, | Performed by: FAMILY MEDICINE

## 2017-08-13 PROCEDURE — 3077F SYST BP >= 140 MM HG: CPT | Mod: S$GLB,,, | Performed by: FAMILY MEDICINE

## 2017-08-13 RX ORDER — NAPROXEN SODIUM 220 MG/1
81 TABLET, FILM COATED ORAL
Status: COMPLETED | OUTPATIENT
Start: 2017-08-13 | End: 2017-08-13

## 2017-08-13 RX ADMIN — NAPROXEN SODIUM 81 MG: 220 TABLET, FILM COATED ORAL at 03:08

## 2017-08-13 RX ADMIN — NAPROXEN SODIUM 81 MG: 220 TABLET, FILM COATED ORAL at 02:08

## 2017-08-13 NOTE — PROGRESS NOTES
Subjective:       Patient ID: Shanda Shaffer is a 62 y.o. female.    Vitals:  height is 5' (1.524 m) and weight is 86.2 kg (190 lb). Her oral temperature is 97.8 °F (36.6 °C). Her blood pressure is 159/119 (abnormal) and her pulse is 56 (abnormal). Her respiration is 18 and oxygen saturation is 100%.     Chief Complaint: Hand Pain (right 2nd finger)    Hand Pain    Incident onset: 3 days. There was no injury mechanism. The pain is present in the right hand. The quality of the pain is described as stabbing. The pain radiates to the right hand. The pain is at a severity of 4/10. The pain is moderate. The pain has been constant since the incident. The symptoms are aggravated by palpation. She has tried nothing for the symptoms.     Review of Systems   Skin: Positive for color change.       Objective:      Physical Exam   Constitutional: She is oriented to person, place, and time. She appears well-developed and well-nourished. She is cooperative.  Non-toxic appearance. She does not appear ill. No distress.   HENT:   Head: Normocephalic and atraumatic. Head is without abrasion, without contusion and without laceration.   Right Ear: Hearing and external ear normal. No hemotympanum.   Left Ear: Hearing and external ear normal. No hemotympanum.   Nose: Nose normal. No mucosal edema, rhinorrhea or nasal deformity. No epistaxis.   Mouth/Throat: Uvula is midline, oropharynx is clear and moist and mucous membranes are normal. No trismus in the jaw. Normal dentition. No uvula swelling. No posterior oropharyngeal erythema.   Eyes: Conjunctivae and lids are normal. Right eye exhibits no discharge. Left eye exhibits no discharge. No scleral icterus.   Sclera clear bilat   Neck: Trachea normal, normal range of motion and phonation normal. Neck supple. No neck rigidity. No tracheal deviation present.   Cardiovascular: Normal rate, regular rhythm, normal heart sounds and intact distal pulses.    Pulses:       Radial pulses are 1+ on  the right side.   Patient has cyanosis and coolness to the distal phalanx of right index finger.  She has good capillary refill to all the other fingers,  She has good radial pulse to that wrist.    Pulmonary/Chest: Effort normal and breath sounds normal. No respiratory distress.   Abdominal: Soft. Normal appearance and bowel sounds are normal. She exhibits no distension (patient is overweight), no pulsatile midline mass and no mass. There is no tenderness.   Musculoskeletal: Normal range of motion. She exhibits no edema or deformity.   Neurological: She is alert and oriented to person, place, and time. She has normal strength. No cranial nerve deficit or sensory deficit. She exhibits normal muscle tone. She displays no seizure activity. Coordination normal. GCS eye subscore is 4. GCS verbal subscore is 5. GCS motor subscore is 6.   Skin: Skin is warm, dry and intact. Capillary refill takes less than 2 seconds. No abrasion, no bruising, no burn, no ecchymosis and no laceration noted. She is not diaphoretic. No pallor.   The cuticle beside the fingernail of the right index finger does show some scabbing.  NO redness or warmth to the area.    Psychiatric: She has a normal mood and affect. Her speech is normal and behavior is normal. Judgment and thought content normal. Cognition and memory are normal.   Nursing note and vitals reviewed.      Assessment:       1. Altered tissue perfusion    2. Finger pain, right        Plan:         Altered tissue perfusion  -     Ambulatory referral to Vascular Surgery  -     aspirin chewable tablet 81 mg; Take 1 tablet (81 mg total) by mouth one time.    Finger pain, right    Shanda was seen today for hand pain.    Diagnoses and all orders for this visit:    Altered tissue perfusion  -     Ambulatory referral to Vascular Surgery  -     aspirin chewable tablet 81 mg; Take 1 tablet (81 mg total) by mouth one time.    Finger pain, right      Discussed this patient with Dr Thomas [facility],  Who recommended talking to vascular surgery.  Contacted Dr. Araya who recommended 2 baby aspirin today,  Continue anti inflammation medication and pain med if needed and follow up in vascular clinic, contact to be made on Monday, next week.     Patient was given 2 baby aspirin in the urgent care today.   Follow Up Comments   Make sure that you follow up with your primary care doctor in the next 2-5 days if needed .  Return to the Urgent Care if signs or symptoms change and certainly if you have worsening symptoms go to the nearest emergency department for further evaluation.     Cynthia Cheema MD

## 2017-08-13 NOTE — PATIENT INSTRUCTIONS
Elevated Blood Pressure  Your blood pressure was elevated during your visit to the urgent care.  It was not so high that immediate care was needed but it is recommended that you monitor your blood pressure over the next week or two to make sure that it is not staying elevated.  Please have your blood pressure taken 2-3 times daily at different times of the day.  Write all of those blood pressures down and record the time that they were taken.  Keep all that information and take it with you to see your Primary Care Physician.  If your blood pressure is consistently above 140/90 you will need to follow up with your PCP more quickly    YOU WILL NEED TO FOLLOW UP WITH Dr. BOX IN VASCULAR SURGERY NEXT WEEK.  HIS CLINIC NUMBER [id number] AND HIS NURSE'S NAME IS VIDA.    PLEASE CALL  THAT CLINIC Monday IF YOU DO NOT HEAR FROM SOMEONE ABOUT THE REFERRAL TO THE VASCULAR CLINIC    START USING ONE A DAY BABY ASPIRIN AND CONTINUE TO TAKE YOUR MELOXICAM AND YOUR TRAMADOL FOR PAIN IF NEEDED.

## 2017-08-14 ENCOUNTER — HOSPITAL ENCOUNTER (OUTPATIENT)
Dept: VASCULAR SURGERY | Facility: CLINIC | Age: 62
Discharge: HOME | End: 2017-08-14
Attending: SURGERY
Payer: COMMERCIAL

## 2017-08-14 ENCOUNTER — INITIAL CONSULT (OUTPATIENT)
Dept: VASCULAR SURGERY | Facility: CLINIC | Age: 62
End: 2017-08-14
Payer: COMMERCIAL

## 2017-08-14 VITALS
WEIGHT: 194 LBS | BODY MASS INDEX: 33.12 KG/M2 | TEMPERATURE: 98 F | HEIGHT: 64 IN | DIASTOLIC BLOOD PRESSURE: 70 MMHG | HEART RATE: 56 BPM | SYSTOLIC BLOOD PRESSURE: 146 MMHG

## 2017-08-14 DIAGNOSIS — I73.9 PVD (PERIPHERAL VASCULAR DISEASE): Primary | ICD-10-CM

## 2017-08-14 DIAGNOSIS — I74.9: Primary | ICD-10-CM

## 2017-08-14 DIAGNOSIS — I73.9 PVD (PERIPHERAL VASCULAR DISEASE): ICD-10-CM

## 2017-08-14 PROCEDURE — 99204 OFFICE O/P NEW MOD 45 MIN: CPT | Mod: S$GLB,,, | Performed by: SURGERY

## 2017-08-14 PROCEDURE — 3078F DIAST BP <80 MM HG: CPT | Mod: S$GLB,,, | Performed by: SURGERY

## 2017-08-14 PROCEDURE — 99999 PR PBB SHADOW E&M-EST. PATIENT-LVL III: CPT | Mod: PBBFAC,,, | Performed by: SURGERY

## 2017-08-14 PROCEDURE — 93922 UPR/L XTREMITY ART 2 LEVELS: CPT | Mod: 52,S$GLB,, | Performed by: SURGERY

## 2017-08-14 PROCEDURE — 3077F SYST BP >= 140 MM HG: CPT | Mod: S$GLB,,, | Performed by: SURGERY

## 2017-08-14 PROCEDURE — 3008F BODY MASS INDEX DOCD: CPT | Mod: S$GLB,,, | Performed by: SURGERY

## 2017-08-14 RX ORDER — ASPIRIN 81 MG/1
162 TABLET ORAL DAILY
Qty: 60 TABLET | Refills: 11 | Status: SHIPPED | OUTPATIENT
Start: 2017-08-14 | End: 2018-09-06

## 2017-08-14 RX ORDER — SIMVASTATIN 20 MG/1
20 TABLET, FILM COATED ORAL NIGHTLY
Qty: 90 TABLET | Refills: 3 | Status: SHIPPED | OUTPATIENT
Start: 2017-08-14 | End: 2018-01-03

## 2017-08-14 NOTE — LETTER
August 14, 2017      Cynthia Cheema MD  [address]  [place] [address]           [facility] - Vascular Surgery  [address]  [place] [address]  Phone: [phone]  Fax: [phone]          Patient: Shanda Shaffer   MR Number: [id number]   YOB: 1955   Date of Visit: 8/14/2017       Dear Dr. Cynthia Cheema:    Thank you for referring Shanda Shaffer to me for evaluation. Attached you will find relevant portions of my assessment and plan of care.    If you have questions, please do not hesitate to call me. I look forward to following Shanda Shaffer along with you.    Sincerely,    Jayce Araya MD    Enclosure  CC:  No Recipients    If you would like to receive this communication electronically, please contact [email or web address] or [phone] to request more information on [employer] Link access.    For providers and/or their staff who would like to refer a patient to [facility], please contact us through our one-stop-shop provider referral line, [facility], at [phone].    If you feel you have received this communication in error or would no longer like to receive these types of communications, please e-mail [email or web address]

## 2017-08-14 NOTE — PROGRESS NOTES
Shanda Shaffer  2017  Referred by: Dr. Cheema at Urgent Care  HPI:  Patient is a 62 y.o. female with HTN and HLD who is here today for evaluation of  Right 2nd finger discoloration.  Pt. Reports that 5-6 days ago she had a hang nail to the right 2nd digit which then started turning purple and becoming cold at the distal tip about 4 days.  She went to urgent care yesterday and was told to take ASA and take pain meds as needed and come to vascular appointment today.  She is not taking a statin.  She denies history of autoimmune problems.  She had bilateral TKA 18 months ago.      No personal or family history of coagulation abnormalities  No DVT/PE  No  MI/stroke  Tobacco use: Quit 8 years ago; 15 pack year history    Works as in [employer] department at [employer]    Past Medical History:   Diagnosis Date    Acne vulgaris 2015    Benign hypertension 2015    Bladder prolapse, female, acquired 2015    Diverticulosis of large intestine without hemorrhage 2015    Esophageal stricture 2015    Former smoker: 1 pack a day for 30 years, quit 3/11/10 3/17/2017    Gastroesophageal reflux disease without esophagitis 2015    Hyperlipidemia     Non morbid obesity due to excess calories 2015    Primary osteoarthritis of both knees 2015     Past Surgical History:   Procedure Laterality Date    bilateral knee surgery  2016    BREAST SURGERY      cyst removal     SECTION      x 4    KNEE SURGERY Bilateral 2016    TK    TONSILLECTOMY       Family History   Problem Relation Age of Onset    Diabetes Mother     Heart disease Mother     Arthritis Mother     Hypertension Father     Heart disease Father      PPM    Diabetes Sister     Heart disease Sister      valve replacement    Diabetes Brother     Arthritis Brother      RA    No Known Problems Son     Cancer Maternal Grandmother      breast    No Known Problems Son     No Known Problems Son     No  Known Problems Son      Social History     Social History    Marital status:      Spouse name: N/A    Number of children: 4    Years of education: N/A     Occupational History    Not on file.     Social History Main Topics    Smoking status: Former Smoker     Packs/day: 1.00     Years: 30.00     Types: Cigarettes     Quit date: 3/11/2010    Smokeless tobacco: Never Used    Alcohol use Yes      Comment: occasional    Drug use: No    Sexual activity: Not Currently     Other Topics Concern    Not on file     Social History Narrative    No narrative on file     Current Outpatient Prescriptions on File Prior to Visit   Medication Sig    diclofenac sodium 1 % Gel APPLY 2 GRAMS TOPICALLY 4 TIMES DAILY    estradiol (ESTRACE) 0.01 % (0.1 mg/gram) vaginal cream Place 1 g vaginally once daily.    fluconazole (DIFLUCAN) 150 MG Tab     lidocaine HCL 2% (XYLOCAINE) 2 % jelly Apply to affected area  prn    losartan-hydrochlorothiazide 100-25 mg (HYZAAR) 100-25 mg per tablet TAKE 1 TABLET ONCE DAILY    losartan-hydrochlorothiazide 100-25 mg (HYZAAR) 100-25 mg per tablet Take 1 tablet by mouth once daily.    meloxicam (MOBIC) 15 MG tablet Take 1 tablet (15 mg total) by mouth once daily.    metoprolol succinate (TOPROL-XL) 50 MG 24 hr tablet Take 1 tablet (50 mg total) by mouth once daily.    mupirocin calcium 2% (BACTROBAN) 2 % cream     nystatin-triamcinolone (MYCOLOG II) cream APPLY  CREAM TOPICALLY TO AFFECTED AREA TWICE DAILY    nystatin-triamcinolone (MYCOLOG II) cream APPLY  CREAM TOPICALLY TO AFFECTED AREA TWICE DAILY    PREPOPIK 10 mg-3.5 gram-12 gram PwPk     tramadol (ULTRAM) 50 mg tablet Take 1 tablet (50 mg total) by mouth every 8 (eight) hours as needed for Pain.    valacyclovir (VALTREX) 1000 MG tablet     trospium (SANCTURA) 20 mg Tab tablet Take 1 tablet (20 mg total) by mouth 2 (two) times daily.     Current Facility-Administered Medications on File Prior to Visit   Medication     [COMPLETED] aspirin chewable tablet 81 mg       REVIEW OF SYSTEMS:  General: negative; ENT: negative; Allergy and Immunology: negative; Hematological and Lymphatic: Negative; Endocrine: negative; Respiratory: no cough, shortness of breath, or wheezing; Cardiovascular: no chest pain or dyspnea on exertion; Gastrointestinal: no abdominal pain/back, change in bowel habits, or bloody stools; Genito-Urinary: no dysuria, trouble voiding, or hematuria; Musculoskeletal: negative  Neurological: no TIA or stroke symptoms    PHYSICAL EXAM:   Right Arm BP - Sittin/73 (17 1542)  Left Arm BP - Sittin/70 (17 1542)  Pulse: (!) 56  Temp: 98.1 °F (36.7 °C)      General appearance:  Alert, well-appearing, and in no distress.  Oriented to person, place, and time   Neurological: Normal speech, no focal findings noted; CN II - XII grossly intact           Musculoskeletal: Digits/nail without cyanosis/clubbing.  Normal muscle strength/tone.                 Neck: Supple, no significant adenopathy; thyroid is not enlarged                  No carotid bruit can be auscultated                Chest:  Clear to auscultation, no wheezes, rales or rhonchi, symmetric air entry     No use of accessory muscles             Cardiac: Normal rate and regular rhythm, S1 and S2 normal; PMI non-displaced          Abdomen: Soft, nontender, nondistended, no masses or organomegaly     No rebound tenderness noted; bowel sounds normal     No Palpable Pulsatile aortic mass.      No groin adenopathy      Extremities: 2+ radial pulse bilaterally     Right 2nd digit with purple discoloration to the distal phalanx with delayed cap refill     2+ femoral pulses bilaterally      2+ pedal pulses palpable.     No pedal edema     No ulcerations    LAB RESULTS:  Lab Results   Component Value Date    K 4.1 2017    K 4.2 2016    K 4.4 2016    CREATININE 0.7 2017    CREATININE 0.8 2016    CREATININE 0.8 2016     Lab  Results   Component Value Date    WBC 9.17 03/17/2017    WBC 5.60 12/16/2015    HCT 39.9 03/17/2017    HCT 41.0 12/16/2015     03/17/2017     12/16/2015     No results found for: HGBA1C  IMAGING:  Right Finger PPGs 8/14/17  Right: All digital PPG waveforms within normal limits except for the 2nd digit.  Abnormal flat wave PPG waveforms of the 2nd digit noted.     IMP/PLAN:  62 y.o. female with a history of HTN and HLD with right 2nd finger: possible etiology may be cholesterol embolus but also need to r/o any proximal aortic arch lesion  Start statin  Continue ASA 81 po bid  CTA chest non coronary  F/u after CTA  Hypercoagulable w/u, though this is unlikely given she is 61 yo and has never had an embolic event before    Jayce Araya MD FACS  Vascular/Endovascular Surgery

## 2017-08-15 ENCOUNTER — PATIENT MESSAGE (OUTPATIENT)
Dept: VASCULAR SURGERY | Facility: CLINIC | Age: 62
End: 2017-08-15

## 2017-08-15 ENCOUNTER — TELEPHONE (OUTPATIENT)
Dept: ORTHOPEDICS | Facility: CLINIC | Age: 62
End: 2017-08-15

## 2017-08-15 DIAGNOSIS — I73.9 PVD (PERIPHERAL VASCULAR DISEASE): Primary | ICD-10-CM

## 2017-08-15 DIAGNOSIS — M79.641 RIGHT HAND PAIN: Primary | ICD-10-CM

## 2017-08-15 NOTE — TELEPHONE ENCOUNTER
Spoke w/pt, she states she already saw someone else for her hand and does not need an appointment here.

## 2017-08-15 NOTE — TELEPHONE ENCOUNTER
----- Message from Melina Powell sent at 8/15/2017 10:08 AM CDT -----  _  1st Request  _  2nd Request  _  3rd Request        Who: patient    Why: pt is calling for a sooner appt than 9/15, she has a referral in the system PVD (peripheral vascular disease) [I73.9], she needs to see someone as soon as possible. She may lose her finger    What Number to Call Back: [phone]    When to Expect a call back: (With in 24 hours)

## 2017-08-17 ENCOUNTER — PATIENT MESSAGE (OUTPATIENT)
Dept: PAIN MEDICINE | Facility: HOSPITAL | Age: 62
End: 2017-08-17

## 2017-08-18 ENCOUNTER — HOSPITAL ENCOUNTER (OUTPATIENT)
Dept: RADIOLOGY | Facility: HOSPITAL | Age: 62
Discharge: HOME | End: 2017-08-18
Attending: NURSE PRACTITIONER
Payer: COMMERCIAL

## 2017-08-18 DIAGNOSIS — I74.9: ICD-10-CM

## 2017-08-18 LAB
CREAT SERPL-MCNC: 0.9 MG/DL (ref 0.5–1.4)
SAMPLE: NORMAL

## 2017-08-18 PROCEDURE — 71275 CT ANGIOGRAPHY CHEST: CPT | Mod: 26,,, | Performed by: RADIOLOGY

## 2017-08-18 PROCEDURE — 71275 CT ANGIOGRAPHY CHEST: CPT | Mod: TC

## 2017-08-18 PROCEDURE — 25500020 PHARM REV CODE 255: Performed by: NURSE PRACTITIONER

## 2017-08-18 RX ADMIN — IOHEXOL 100 ML: 350 INJECTION, SOLUTION INTRAVENOUS at 05:08

## 2017-08-22 DIAGNOSIS — Z96.653 STATUS POST TOTAL BILATERAL KNEE REPLACEMENT: ICD-10-CM

## 2017-08-22 RX ORDER — TRAMADOL HYDROCHLORIDE 50 MG/1
50 TABLET ORAL EVERY 8 HOURS PRN
Qty: 60 TABLET | Refills: 0 | Status: SHIPPED | OUTPATIENT
Start: 2017-08-22 | End: 2018-01-03

## 2017-08-22 RX ORDER — MELOXICAM 15 MG/1
15 TABLET ORAL DAILY
Qty: 30 TABLET | Refills: 0 | Status: SHIPPED | OUTPATIENT
Start: 2017-08-22 | End: 2017-09-30 | Stop reason: SDUPTHER

## 2017-08-23 ENCOUNTER — OFFICE VISIT (OUTPATIENT)
Dept: VASCULAR SURGERY | Facility: CLINIC | Age: 62
End: 2017-08-23
Payer: COMMERCIAL

## 2017-08-23 VITALS
DIASTOLIC BLOOD PRESSURE: 84 MMHG | HEIGHT: 64 IN | HEART RATE: 62 BPM | WEIGHT: 190 LBS | TEMPERATURE: 99 F | BODY MASS INDEX: 32.44 KG/M2 | RESPIRATION RATE: 18 BRPM | SYSTOLIC BLOOD PRESSURE: 180 MMHG

## 2017-08-23 DIAGNOSIS — I73.9 PVD (PERIPHERAL VASCULAR DISEASE): Primary | ICD-10-CM

## 2017-08-23 DIAGNOSIS — M79.644 FINGER PAIN, RIGHT: Primary | ICD-10-CM

## 2017-08-23 PROCEDURE — 3077F SYST BP >= 140 MM HG: CPT | Mod: S$GLB,,, | Performed by: SURGERY

## 2017-08-23 PROCEDURE — 3008F BODY MASS INDEX DOCD: CPT | Mod: S$GLB,,, | Performed by: SURGERY

## 2017-08-23 PROCEDURE — 3079F DIAST BP 80-89 MM HG: CPT | Mod: S$GLB,,, | Performed by: SURGERY

## 2017-08-23 PROCEDURE — 99214 OFFICE O/P EST MOD 30 MIN: CPT | Mod: S$GLB,,, | Performed by: SURGERY

## 2017-08-23 PROCEDURE — 99999 PR PBB SHADOW E&M-EST. PATIENT-LVL III: CPT | Mod: PBBFAC,,, | Performed by: SURGERY

## 2017-08-23 RX ORDER — HYDROCODONE BITARTRATE AND ACETAMINOPHEN 5; 325 MG/1; MG/1
5-325 TABLET ORAL
COMMUNITY
Start: 2017-08-21 | End: 2017-12-26

## 2017-08-23 RX ORDER — SIMVASTATIN 20 MG/1
20 TABLET, FILM COATED ORAL DAILY
COMMUNITY
Start: 2017-08-14 | End: 2017-09-07 | Stop reason: SDUPTHER

## 2017-08-23 NOTE — PROGRESS NOTES
Shanda Shaffer  2017  Referred by: Dr. Cheema at Urgent Care  HPI:   Patient is a 62 y.o. female with HTN and HLD who is here today for f/u of  Right 2nd finger discoloration.  Pt. Reports that 5-6 days ago she had a hang nail to the right 2nd digit which then started turning purple and becoming cold at the distal tip about 4 days.  She went to urgent care yesterday and was told to take ASA and take pain meds as needed and come to vascular appointment today.  She is not taking a statin.  She denies history of autoimmune problems.  She had bilateral TKA 18 months ago.      No personal or family history of coagulation abnormalities  No DVT/PE  No  MI/stroke  Tobacco use: Quit 8 years ago; 15 pack year history    Works as in [employer] department at [employer]    PMD is Dr Cintia Villalta ()  PMD is Dr JULIUS Peres    Past Medical History:   Diagnosis Date    Acne vulgaris 2015    Benign hypertension 2015    Bladder prolapse, female, acquired 2015    Diverticulosis of large intestine without hemorrhage 2015    Esophageal stricture 2015    Former smoker: 1 pack a day for 30 years, quit 3/11/10 3/17/2017    Gastroesophageal reflux disease without esophagitis 2015    Hyperlipidemia     Non morbid obesity due to excess calories 2015    Primary osteoarthritis of both knees 2015     Past Surgical History:   Procedure Laterality Date    bilateral knee surgery  2016    BREAST SURGERY      cyst removal     SECTION      x 4    KNEE SURGERY Bilateral 2016    TK    TONSILLECTOMY       Family History   Problem Relation Age of Onset    Diabetes Mother     Heart disease Mother     Arthritis Mother     Hypertension Father     Heart disease Father      PPM    Diabetes Sister     Heart disease Sister      valve replacement    Diabetes Brother     Arthritis Brother      RA    No Known Problems Son     Cancer Maternal Grandmother      breast    No  Known Problems Son     No Known Problems Son     No Known Problems Son      Social History     Social History    Marital status:      Spouse name: N/A    Number of children: 4    Years of education: N/A     Occupational History    Not on file.     Social History Main Topics    Smoking status: Former Smoker     Packs/day: 1.00     Years: 30.00     Types: Cigarettes     Quit date: 3/11/2010    Smokeless tobacco: Never Used    Alcohol use Yes      Comment: occasional    Drug use: No    Sexual activity: Not Currently     Other Topics Concern    Not on file     Social History Narrative    No narrative on file     Current Outpatient Prescriptions on File Prior to Visit   Medication Sig    aspirin (ECOTRIN) 81 MG EC tablet Take 2 tablets (162 mg total) by mouth once daily.    diclofenac sodium 1 % Gel APPLY 2 GRAMS TOPICALLY 4 TIMES DAILY    estradiol (ESTRACE) 0.01 % (0.1 mg/gram) vaginal cream Place 1 g vaginally once daily.    fluconazole (DIFLUCAN) 150 MG Tab     lidocaine HCL 2% (XYLOCAINE) 2 % jelly Apply to affected area  prn    losartan-hydrochlorothiazide 100-25 mg (HYZAAR) 100-25 mg per tablet TAKE 1 TABLET ONCE DAILY    losartan-hydrochlorothiazide 100-25 mg (HYZAAR) 100-25 mg per tablet Take 1 tablet by mouth once daily.    meloxicam (MOBIC) 15 MG tablet Take 1 tablet (15 mg total) by mouth once daily.    metoprolol succinate (TOPROL-XL) 50 MG 24 hr tablet Take 1 tablet (50 mg total) by mouth once daily.    mupirocin calcium 2% (BACTROBAN) 2 % cream     nitroGLYCERIN 2% TD oint (NITROGLYN) 2 % ointment Apply ointment to right 2nd finger every 4-6 hours as needed for pain    nystatin-triamcinolone (MYCOLOG II) cream APPLY  CREAM TOPICALLY TO AFFECTED AREA TWICE DAILY    nystatin-triamcinolone (MYCOLOG II) cream APPLY  CREAM TOPICALLY TO AFFECTED AREA TWICE DAILY    PREPOPIK 10 mg-3.5 gram-12 gram PwPk     simvastatin (ZOCOR) 20 MG tablet Take 1 tablet (20 mg total) by mouth  every evening.    tramadol (ULTRAM) 50 mg tablet Take 1 tablet (50 mg total) by mouth every 8 (eight) hours as needed for Pain.    valacyclovir (VALTREX) 1000 MG tablet     trospium (SANCTURA) 20 mg Tab tablet Take 1 tablet (20 mg total) by mouth 2 (two) times daily.     No current facility-administered medications on file prior to visit.        REVIEW OF SYSTEMS:  General: negative; ENT: negative; Allergy and Immunology: negative; Hematological and Lymphatic: Negative; Endocrine: negative; Respiratory: no cough, shortness of breath, or wheezing; Cardiovascular: no chest pain or dyspnea on exertion; Gastrointestinal: no abdominal pain/back, change in bowel habits, or bloody stools; Genito-Urinary: no dysuria, trouble voiding, or hematuria; Musculoskeletal: negative  Neurological: no TIA or stroke symptoms    PHYSICAL EXAM:   Right Arm BP - Sittin/84 (17 0828)  Left Arm BP - Sittin/85 (17 0828)  Pulse: 62  Temp: 98.6 °F (37 °C)      General appearance:  Alert, well-appearing, and in no distress.  Oriented to person, place, and time   Neurological: Normal speech, no focal findings noted; CN II - XII grossly intact           Musculoskeletal: Digits/nail without cyanosis/clubbing.  Normal muscle strength/tone.                 Neck: Supple, no significant adenopathy; thyroid is not enlarged                  No carotid bruit can be auscultated                Chest:  Clear to auscultation, no wheezes, rales or rhonchi, symmetric air entry     No use of accessory muscles             Cardiac: Normal rate and regular rhythm, S1 and S2 normal; PMI non-displaced          Abdomen: Soft, nontender, nondistended, no masses or organomegaly     No rebound tenderness noted; bowel sounds normal     No Palpable Pulsatile aortic mass.      No groin adenopathy      Extremities: 2+ radial pulse bilaterally     Right 2nd digit with purple discoloration to the distal phalanx with delayed cap refill     2+ femoral  pulses bilaterally      2+ pedal pulses palpable.     No pedal edema     No ulcerations    LAB RESULTS:  Lab Results   Component Value Date    K 4.1 03/17/2017    K 4.2 02/26/2016    K 4.4 02/25/2016    CREATININE 0.7 03/17/2017    CREATININE 0.8 02/26/2016    CREATININE 0.8 02/25/2016     Lab Results   Component Value Date    WBC 9.17 03/17/2017    WBC 5.60 12/16/2015    HCT 39.9 03/17/2017    HCT 41.0 12/16/2015     03/17/2017     12/16/2015     No results found for: HGBA1C  IMAGING:  CTA chesT: no arch lesions; no innominate, L subclavian or axillary artery lesion    Right Finger PPGs 8/14/17  Right: All digital PPG waveforms within normal limits except for the 2nd digit.  Abnormal flat wave PPG waveforms of the 2nd digit noted.     IMP/PLAN:  62 y.o. female with a history of HTN and HLD with right 2nd finger: likely etiology is cholesterol embolus  CTA chest: no inflow lesions; 2+ L radial pulse w negative L hand Hans's test    Continue ASA 81 po bid; statin rx  Hypercoagulable w/u is negative and this is unlikely given she is 61 yo and has never had an embolic event before  Check TTE; will ask Cards to see if negative to determine based on views whether DENISE is necessary  May proceed with L 2nd digit amputation (outside hand surgeon)    Jayce Araya MD FACS  Vascular/Endovascular Surgery

## 2017-08-25 ENCOUNTER — HOSPITAL ENCOUNTER (OUTPATIENT)
Dept: CARDIOLOGY | Facility: CLINIC | Age: 62
Discharge: HOME | End: 2017-08-25
Payer: COMMERCIAL

## 2017-08-25 DIAGNOSIS — I73.9 PVD (PERIPHERAL VASCULAR DISEASE): ICD-10-CM

## 2017-08-25 LAB
DIASTOLIC DYSFUNCTION: NO
RETIRED EF AND QEF - SEE NOTES: 65 (ref 55–65)

## 2017-08-25 PROCEDURE — 93307 TTE W/O DOPPLER COMPLETE: CPT | Mod: S$GLB,,, | Performed by: INTERNAL MEDICINE

## 2017-08-28 ENCOUNTER — OFFICE VISIT (OUTPATIENT)
Dept: VASCULAR SURGERY | Facility: CLINIC | Age: 62
End: 2017-08-28
Payer: COMMERCIAL

## 2017-08-28 VITALS
HEIGHT: 64 IN | WEIGHT: 190 LBS | HEART RATE: 63 BPM | SYSTOLIC BLOOD PRESSURE: 140 MMHG | TEMPERATURE: 98 F | BODY MASS INDEX: 32.44 KG/M2 | DIASTOLIC BLOOD PRESSURE: 78 MMHG

## 2017-08-28 DIAGNOSIS — M79.644 FINGER PAIN, RIGHT: Primary | ICD-10-CM

## 2017-08-28 PROCEDURE — 3008F BODY MASS INDEX DOCD: CPT | Mod: S$GLB,,, | Performed by: SURGERY

## 2017-08-28 PROCEDURE — 99214 OFFICE O/P EST MOD 30 MIN: CPT | Mod: S$GLB,,, | Performed by: SURGERY

## 2017-08-28 PROCEDURE — 99999 PR PBB SHADOW E&M-EST. PATIENT-LVL III: CPT | Mod: PBBFAC,,, | Performed by: SURGERY

## 2017-08-28 PROCEDURE — 3078F DIAST BP <80 MM HG: CPT | Mod: S$GLB,,, | Performed by: SURGERY

## 2017-08-28 PROCEDURE — 3077F SYST BP >= 140 MM HG: CPT | Mod: S$GLB,,, | Performed by: SURGERY

## 2017-08-28 NOTE — PROGRESS NOTES
Shanda Shaffer  2017  Referred by: Dr. Cheema at Urgent Care  HPI:   Patient is a 62 y.o. female with HTN and HLD who is here today for f/u of right 2nd finger discoloration.  Pt. Reports that 5-6 days ago she had a hang nail to the right 2nd digit which then started turning purple and becoming cold at the distal tip about 4 days.  She went to urgent care yesterday and was told to take ASA and take pain meds as needed and come to vascular appointment today.  She is not taking a statin.  She denies history of autoimmune problems.  She had bilateral TKA 18 months ago.      No personal or family history of coagulation abnormalities  No DVT/PE  No  MI/stroke  Tobacco use: Quit 8 years ago; 15 pack year history    Works as in [employer] department at [employer]    PMD is Dr Cintia Villalta ()  PMD is Dr JULIUS Peres    Past Medical History:   Diagnosis Date    Acne vulgaris 2015    Benign hypertension 2015    Bladder prolapse, female, acquired 2015    Diverticulosis of large intestine without hemorrhage 2015    Esophageal stricture 2015    Former smoker: 1 pack a day for 30 years, quit 3/11/10 3/17/2017    Gastroesophageal reflux disease without esophagitis 2015    Hyperlipidemia     Non morbid obesity due to excess calories 2015    Primary osteoarthritis of both knees 2015     Past Surgical History:   Procedure Laterality Date    bilateral knee surgery  2016    BREAST SURGERY      cyst removal     SECTION      x 4    KNEE SURGERY Bilateral 2016    TK    TONSILLECTOMY       Family History   Problem Relation Age of Onset    Diabetes Mother     Heart disease Mother     Arthritis Mother     Hypertension Father     Heart disease Father      PPM    Diabetes Sister     Heart disease Sister      valve replacement    Diabetes Brother     Arthritis Brother      RA    No Known Problems Son     Cancer Maternal Grandmother      breast    No  Known Problems Son     No Known Problems Son     No Known Problems Son      Social History     Social History    Marital status:      Spouse name: N/A    Number of children: 4    Years of education: N/A     Occupational History    Not on file.     Social History Main Topics    Smoking status: Former Smoker     Packs/day: 1.00     Years: 30.00     Types: Cigarettes     Quit date: 3/11/2010    Smokeless tobacco: Never Used    Alcohol use Yes      Comment: occasional    Drug use: No    Sexual activity: Not Currently     Other Topics Concern    Not on file     Social History Narrative    No narrative on file     Current Outpatient Prescriptions on File Prior to Visit   Medication Sig    aspirin (ECOTRIN) 81 MG EC tablet Take 2 tablets (162 mg total) by mouth once daily.    diclofenac sodium 1 % Gel APPLY 2 GRAMS TOPICALLY 4 TIMES DAILY    estradiol (ESTRACE) 0.01 % (0.1 mg/gram) vaginal cream Place 1 g vaginally once daily.    fluconazole (DIFLUCAN) 150 MG Tab     hydrocodone-acetaminophen 5-325mg (NORCO) 5-325 mg per tablet Take 5-325 tablets by mouth as needed.    lidocaine HCL 2% (XYLOCAINE) 2 % jelly Apply to affected area  prn    losartan-hydrochlorothiazide 100-25 mg (HYZAAR) 100-25 mg per tablet TAKE 1 TABLET ONCE DAILY    losartan-hydrochlorothiazide 100-25 mg (HYZAAR) 100-25 mg per tablet Take 1 tablet by mouth once daily.    meloxicam (MOBIC) 15 MG tablet Take 1 tablet (15 mg total) by mouth once daily.    metoprolol succinate (TOPROL-XL) 50 MG 24 hr tablet Take 1 tablet (50 mg total) by mouth once daily.    mupirocin calcium 2% (BACTROBAN) 2 % cream     nitroGLYCERIN 2% TD oint (NITROGLYN) 2 % ointment Apply ointment to right 2nd finger every 4-6 hours as needed for pain    nystatin-triamcinolone (MYCOLOG II) cream APPLY  CREAM TOPICALLY TO AFFECTED AREA TWICE DAILY    nystatin-triamcinolone (MYCOLOG II) cream APPLY  CREAM TOPICALLY TO AFFECTED AREA TWICE DAILY    PREPOPIK  10 mg-3.5 gram-12 gram PwPk     simvastatin (ZOCOR) 20 MG tablet Take 1 tablet (20 mg total) by mouth every evening.    simvastatin (ZOCOR) 20 MG tablet Take 20 mg by mouth once daily.    tramadol (ULTRAM) 50 mg tablet Take 1 tablet (50 mg total) by mouth every 8 (eight) hours as needed for Pain.    valacyclovir (VALTREX) 1000 MG tablet     trospium (SANCTURA) 20 mg Tab tablet Take 1 tablet (20 mg total) by mouth 2 (two) times daily.     No current facility-administered medications on file prior to visit.        REVIEW OF SYSTEMS:  General: negative; ENT: negative; Allergy and Immunology: negative; Hematological and Lymphatic: Negative; Endocrine: negative; Respiratory: no cough, shortness of breath, or wheezing; Cardiovascular: no chest pain or dyspnea on exertion; Gastrointestinal: no abdominal pain/back, change in bowel habits, or bloody stools; Genito-Urinary: no dysuria, trouble voiding, or hematuria; Musculoskeletal: negative  Neurological: no TIA or stroke symptoms    PHYSICAL EXAM:   Right Arm BP - Sittin/78 (17 1558)  Left Arm BP - Sittin/78 (17 1558)  Pulse: 63  Temp: 97.6 °F (36.4 °C)      General appearance:  Alert, well-appearing, and in no distress.  Oriented to person, place, and time   Neurological: Normal speech, no focal findings noted; CN II - XII grossly intact           Musculoskeletal: Digits/nail without cyanosis/clubbing.  Normal muscle strength/tone.                 Neck: Supple, no significant adenopathy; thyroid is not enlarged                  No carotid bruit can be auscultated                Chest:  Clear to auscultation, no wheezes, rales or rhonchi, symmetric air entry     No use of accessory muscles             Cardiac: Normal rate and regular rhythm, S1 and S2 normal; PMI non-displaced          Abdomen: Soft, nontender, nondistended, no masses or organomegaly     No rebound tenderness noted; bowel sounds normal     No Palpable Pulsatile aortic mass.       No groin adenopathy      Extremities: 2+ radial pulse bilaterally     Right 2nd digit with purple discoloration to the distal phalanx with delayed cap refill     2+ femoral pulses bilaterally      2+ pedal pulses palpable.     No pedal edema     No ulcerations    LAB RESULTS:  Lab Results   Component Value Date    K 4.1 03/17/2017    K 4.2 02/26/2016    K 4.4 02/25/2016    CREATININE 0.7 03/17/2017    CREATININE 0.8 02/26/2016    CREATININE 0.8 02/25/2016     Lab Results   Component Value Date    WBC 9.17 03/17/2017    WBC 5.60 12/16/2015    HCT 39.9 03/17/2017    HCT 41.0 12/16/2015     03/17/2017     12/16/2015     No results found for: HGBA1C  IMAGING:  CTA chest:  no arch lesions; no innominate, L subclavian or axillary artery lesion    Right Finger PPGs 8/14/17  Right: All digital PPG waveforms within normal limits except for the 2nd digit.  Abnormal flat wave PPG waveforms of the 2nd digit noted.     TTE: EF 65%, no valvular vegetations    IMP/PLAN:  62 y.o. female with a history of HTN and HLD with right 2nd finger: likely etiology is cholesterol embolus  CTA chest: no inflow lesions; 2+ L radial pulse w negative L hand Hans's test  Continue ASA 81 po bid; statin rx  Hypercoagulable w/u is negative and this is unlikely given she is 61 yo and has never had an embolic event before    May proceed with L 2nd digit amputation (outside hand surgeon); she'd like a 2nd opinion re- the timing, will have her see Dr ELOISA Perez this Wed AM.    Jayce Araya MD FACS  Vascular/Endovascular Surgery

## 2017-09-07 ENCOUNTER — OFFICE VISIT (OUTPATIENT)
Dept: CARDIOLOGY | Facility: CLINIC | Age: 62
End: 2017-09-07
Payer: COMMERCIAL

## 2017-09-07 ENCOUNTER — TELEPHONE (OUTPATIENT)
Dept: CARDIOLOGY | Facility: CLINIC | Age: 62
End: 2017-09-07

## 2017-09-07 VITALS
SYSTOLIC BLOOD PRESSURE: 176 MMHG | WEIGHT: 186.81 LBS | BODY MASS INDEX: 31.89 KG/M2 | HEART RATE: 59 BPM | DIASTOLIC BLOOD PRESSURE: 82 MMHG | HEIGHT: 64 IN

## 2017-09-07 DIAGNOSIS — I73.9 PVD (PERIPHERAL VASCULAR DISEASE): ICD-10-CM

## 2017-09-07 DIAGNOSIS — E78.2 MIXED HYPERLIPIDEMIA: ICD-10-CM

## 2017-09-07 DIAGNOSIS — I75.011 ATHEROEMBOLISM OF RIGHT UPPER EXTREMITY: Primary | ICD-10-CM

## 2017-09-07 DIAGNOSIS — Z87.891 FORMER SMOKER: ICD-10-CM

## 2017-09-07 DIAGNOSIS — R09.89 ALTERED TISSUE PERFUSION: ICD-10-CM

## 2017-09-07 DIAGNOSIS — I10 BENIGN HYPERTENSION: ICD-10-CM

## 2017-09-07 PROCEDURE — 3008F BODY MASS INDEX DOCD: CPT | Mod: S$GLB,,, | Performed by: INTERNAL MEDICINE

## 2017-09-07 PROCEDURE — 3079F DIAST BP 80-89 MM HG: CPT | Mod: S$GLB,,, | Performed by: INTERNAL MEDICINE

## 2017-09-07 PROCEDURE — 99999 PR PBB SHADOW E&M-EST. PATIENT-LVL III: CPT | Mod: PBBFAC,,, | Performed by: INTERNAL MEDICINE

## 2017-09-07 PROCEDURE — 99205 OFFICE O/P NEW HI 60 MIN: CPT | Mod: S$GLB,,, | Performed by: INTERNAL MEDICINE

## 2017-09-07 PROCEDURE — 3077F SYST BP >= 140 MM HG: CPT | Mod: S$GLB,,, | Performed by: INTERNAL MEDICINE

## 2017-09-07 NOTE — LETTER
September 7, 2017      Jayce Araya MD  [address] [facility] [address]           [place] [facility]  [address]  [place] [address]  Phone: [phone]          Patient: Shanda Shaffer   MR Number: [id number]   YOB: 1955   Date of Visit: 9/7/2017       Dear Dr. Jayce Araya:    Thank you for referring Shanda Shaffer to me for evaluation. Attached you will find relevant portions of my assessment and plan of care.    If you have questions, please do not hesitate to call me. I look forward to following Shanda Shaffer along with you.    Sincerely,    Laice Rothman MD    Enclosure  CC:  No Recipients    If you would like to receive this communication electronically, please contact [email or web address] or [phone] to request more information on [employer] Link access.    For providers and/or their staff who would like to refer a patient to [facility], please contact us through our one-stop-shop provider referral line, [facility], at [phone].    If you feel you have received this communication in error or would no longer like to receive these types of communications, please e-mail [email or web address]

## 2017-09-07 NOTE — PROGRESS NOTES
Subjective:   Patient ID:  Shanda Shaffer is a 62 y.o. female [facility]      HPI: Patient was recently seen by Concha Grayson with dry gangrene of the right index finger.  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  Chest CT showed atherosclerosis of aorta.  Lipids are not at goal .     She denies any CV symptoms.  Started statins. IS on BP meds.  Quit smoking in .  Last year her bilateral knee replacement without any CV complications.    Patient admits to dietary non-compliance, She is sedentary and overweight.      The 10-year ASCVD risk score (Melville RUPA JrConcha, et al., 2013) is: 13.4%    Values used to calculate the score:      Age: 62 years      Sex: Female      Is Non- : No      Diabetic: No      Tobacco smoker: No      Systolic Blood Pressure: 176 mmHg      Is BP treated: Yes      HDL Cholesterol: 40 mg/dL      Total Cholesterol: 236 mg/dL      Past Medical History:   Diagnosis Date    Acne vulgaris 2015    Benign hypertension 2015    Bladder prolapse, female, acquired 2015    Diverticulosis of large intestine without hemorrhage 2015    Esophageal stricture 2015    Former smoker: 1 pack a day for 30 years, quit 3/11/10 3/17/2017    Gastroesophageal reflux disease without esophagitis 2015    Hyperlipidemia     Non morbid obesity due to excess calories 2015    Primary osteoarthritis of both knees 2015       Past Surgical History:   Procedure Laterality Date    bilateral knee surgery  2016    BREAST SURGERY      cyst removal     SECTION      x 4    KNEE SURGERY Bilateral 2016    TK    TONSILLECTOMY         Social History     Social History    Marital status:      Spouse name: N/A    Number of children: 4    Years of education: N/A     Social History Main Topics    Smoking status: Former Smoker     Packs/day: 1.00     Years: 30.00     Types: Cigarettes     Quit date:  "3/11/2010    Smokeless tobacco: Never Used    Alcohol use Yes      Comment: occasional    Drug use: No    Sexual activity: Not Currently     Other Topics Concern    None     Social History Narrative    None       Review of patient's allergies indicates:   Allergen Reactions    Percocet [oxycodone-acetaminophen] Nausea Only     Severe nausea with inability to eat.       Lab Results   Component Value Date     03/17/2017    K 4.1 03/17/2017     03/17/2017    CO2 28 03/17/2017    BUN 18 03/17/2017    CREATININE 0.7 03/17/2017     03/17/2017    MG 2.3 02/24/2016    AST 18 03/17/2017    ALT 26 03/17/2017    ALBUMIN 3.7 03/17/2017    PROT 6.8 03/17/2017    BILITOT 0.4 03/17/2017    WBC 9.17 03/17/2017    HGB 13.6 03/17/2017    HCT 39.9 03/17/2017    MCV 93 03/17/2017     03/17/2017    INR 2.2 (H) 03/04/2016    TSH 1.386 03/24/2017    CHOL 236 (H) 03/17/2017    HDL 40 03/17/2017    LDLCALC 162.8 (H) 03/17/2017    TRIG 166 (H) 03/17/2017       Review of Systems   Constitution: Negative.   HENT: Negative.    Eyes: Negative.    Cardiovascular: Negative.  Negative for chest pain, claudication, dyspnea on exertion, irregular heartbeat, leg swelling, near-syncope, palpitations and syncope.   Respiratory: Negative.  Negative for cough, shortness of breath, snoring and wheezing.    Endocrine: Negative.  Negative for cold intolerance, heat intolerance, polydipsia, polyphagia and polyuria.   Skin: Negative.    Musculoskeletal: Negative.    Gastrointestinal: Negative.    Genitourinary: Negative.    Neurological: Negative.    Psychiatric/Behavioral: Negative.        Objective:   Physical Exam   Constitutional: She is oriented to person, place, and time. She appears well-developed and well-nourished.   BP (!) 176/82   Pulse (!) 59   Ht 5' 4" (1.626 m)   Wt 84.8 kg (186 lb 13.4 oz)   BMI 32.07 kg/m²      HENT:   Head: Normocephalic.   Eyes: Pupils are equal, round, and reactive to light.   Neck: " Normal range of motion. Neck supple. Carotid bruit is not present. No thyromegaly present.   Cardiovascular: Normal rate, regular rhythm, normal heart sounds and intact distal pulses.  Exam reveals no gallop and no friction rub.    No murmur heard.  Pulses:       Carotid pulses are 2+ on the right side, and 2+ on the left side.       Radial pulses are 2+ on the right side, and 2+ on the left side.        Femoral pulses are 2+ on the right side, and 2+ on the left side.       Popliteal pulses are 2+ on the right side, and 2+ on the left side.        Dorsalis pedis pulses are 2+ on the right side, and 2+ on the left side.        Posterior tibial pulses are 2+ on the right side, and 2+ on the left side.   Pulmonary/Chest: Effort normal and breath sounds normal. No respiratory distress. She has no wheezes. She has no rales. She exhibits no tenderness.   Abdominal: Soft. Bowel sounds are normal.   obese   Musculoskeletal: Normal range of motion. She exhibits no edema.   Dry gangrene of the right index distal phalanx   Neurological: She is alert and oriented to person, place, and time.   Skin: Skin is warm and dry.   Psychiatric: She has a normal mood and affect.   Nursing note and vitals reviewed.      Current Outpatient Prescriptions   Medication Sig    aspirin (ECOTRIN) 81 MG EC tablet Take 2 tablets (162 mg total) by mouth once daily.    diclofenac sodium 1 % Gel APPLY 2 GRAMS TOPICALLY 4 TIMES DAILY    estradiol (ESTRACE) 0.01 % (0.1 mg/gram) vaginal cream Place 1 g vaginally once daily.    hydrocodone-acetaminophen 5-325mg (NORCO) 5-325 mg per tablet Take 5-325 tablets by mouth as needed.    losartan-hydrochlorothiazide 100-25 mg (HYZAAR) 100-25 mg per tablet Take 1 tablet by mouth once daily.    meloxicam (MOBIC) 15 MG tablet Take 1 tablet (15 mg total) by mouth once daily.    metoprolol succinate (TOPROL-XL) 50 MG 24 hr tablet Take 1 tablet (50 mg total) by mouth once daily.    simvastatin (ZOCOR) 20 MG  tablet Take 1 tablet (20 mg total) by mouth every evening.    tramadol (ULTRAM) 50 mg tablet Take 1 tablet (50 mg total) by mouth every 8 (eight) hours as needed for Pain.    trospium (SANCTURA) 20 mg Tab tablet Take 1 tablet (20 mg total) by mouth 2 (two) times daily.    PREPOPIK 10 mg-3.5 gram-12 gram PwPk Take by mouth. Pt has this medication on hold.     No current facility-administered medications for this visit.        Assessment and Plan:     Problem List Items Addressed This Visit        Cardiology Problems    Benign hypertension    Relevant Orders    2D echo with color flow doppler and bubble contrast    Holter monitor - 24 hour    Mixed hyperlipidemia    Relevant Orders    2D echo with color flow doppler and bubble contrast    Holter monitor - 24 hour    PVD (peripheral vascular disease)    Relevant Orders    2D echo with color flow doppler and bubble contrast    Holter monitor - 24 hour    Atheroembolism of right upper extremity - Primary    Relevant Orders    2D echo with color flow doppler and bubble contrast    Holter monitor - 24 hour       Other    Former smoker: 1 pack a day for 30 years, quit 3/11/10    Relevant Orders    2D echo with color flow doppler and bubble contrast    Holter monitor - 24 hour    Altered tissue perfusion    Relevant Orders    2D echo with color flow doppler and bubble contrast    Holter monitor - 24 hour      Other Visit Diagnoses    None.         Patient's Medications   New Prescriptions    No medications on file   Previous Medications    ASPIRIN (ECOTRIN) 81 MG EC TABLET    Take 2 tablets (162 mg total) by mouth once daily.    DICLOFENAC SODIUM 1 % GEL    APPLY 2 GRAMS TOPICALLY 4 TIMES DAILY    ESTRADIOL (ESTRACE) 0.01 % (0.1 MG/GRAM) VAGINAL CREAM    Place 1 g vaginally once daily.    HYDROCODONE-ACETAMINOPHEN 5-325MG (NORCO) 5-325 MG PER TABLET    Take 5-325 tablets by mouth as needed.    LOSARTAN-HYDROCHLOROTHIAZIDE 100-25 MG (HYZAAR) 100-25 MG PER TABLET    Take 1  tablet by mouth once daily.    MELOXICAM (MOBIC) 15 MG TABLET    Take 1 tablet (15 mg total) by mouth once daily.    METOPROLOL SUCCINATE (TOPROL-XL) 50 MG 24 HR TABLET    Take 1 tablet (50 mg total) by mouth once daily.    PREPOPIK 10 MG-3.5 GRAM-12 GRAM PWPK    Take by mouth. Pt has this medication on hold.    SIMVASTATIN (ZOCOR) 20 MG TABLET    Take 1 tablet (20 mg total) by mouth every evening.    TRAMADOL (ULTRAM) 50 MG TABLET    Take 1 tablet (50 mg total) by mouth every 8 (eight) hours as needed for Pain.    TROSPIUM (SANCTURA) 20 MG TAB TABLET    Take 1 tablet (20 mg total) by mouth 2 (two) times daily.   Modified Medications    No medications on file   Discontinued Medications    FLUCONAZOLE (DIFLUCAN) 150 MG TAB        LIDOCAINE HCL 2% (XYLOCAINE) 2 % JELLY    Apply to affected area  prn    LOSARTAN-HYDROCHLOROTHIAZIDE 100-25 MG (HYZAAR) 100-25 MG PER TABLET    TAKE 1 TABLET ONCE DAILY    MUPIROCIN CALCIUM 2% (BACTROBAN) 2 % CREAM        NITROGLYCERIN 2% TD OINT (NITROGLYN) 2 % OINTMENT    Apply ointment to right 2nd finger every 4-6 hours as needed for pain    NYSTATIN-TRIAMCINOLONE (MYCOLOG II) CREAM    APPLY  CREAM TOPICALLY TO AFFECTED AREA TWICE DAILY    NYSTATIN-TRIAMCINOLONE (MYCOLOG II) CREAM    APPLY  CREAM TOPICALLY TO AFFECTED AREA TWICE DAILY    SIMVASTATIN (ZOCOR) 20 MG TABLET    Take 20 mg by mouth once daily.    VALACYCLOVIR (VALTREX) 1000 MG TABLET         The presentation is most likely c/w cholesterol emboli. I agree with statin therapy and low cholesterol diet.  Follow up with Dr. Araya to address other vascular etiologies ( Buerger's, Reynoud's etc) and by PCP ( ?Scleroderma etc).  Would need to rule out paradoxical emboli (?PFO) and PAF.  Schedule Holter and CFD with bubble study.  If ok no further CV work up would be indicated at this time and follow up with cardiology as needed.    No Follow-up on file.

## 2017-09-07 NOTE — TELEPHONE ENCOUNTER
Camille at pt's [facility] notified that pt is no longer using Lidocaine HCL 2% Jelly and Nitroglycerin Ointment 2% and to dc it from her profile. Camille verbalized understanding.

## 2017-09-08 ENCOUNTER — CLINICAL SUPPORT (OUTPATIENT)
Dept: CARDIOLOGY | Facility: CLINIC | Age: 62
End: 2017-09-08
Payer: COMMERCIAL

## 2017-09-08 ENCOUNTER — TELEPHONE (OUTPATIENT)
Dept: CARDIOLOGY | Facility: CLINIC | Age: 62
End: 2017-09-08

## 2017-09-08 DIAGNOSIS — I10 BENIGN HYPERTENSION: ICD-10-CM

## 2017-09-08 DIAGNOSIS — I73.9 PVD (PERIPHERAL VASCULAR DISEASE): ICD-10-CM

## 2017-09-08 DIAGNOSIS — I75.011 ATHEROEMBOLISM OF RIGHT UPPER EXTREMITY: ICD-10-CM

## 2017-09-08 DIAGNOSIS — E78.2 MIXED HYPERLIPIDEMIA: ICD-10-CM

## 2017-09-08 DIAGNOSIS — Z87.891 FORMER SMOKER: ICD-10-CM

## 2017-09-08 DIAGNOSIS — R09.89 ALTERED TISSUE PERFUSION: ICD-10-CM

## 2017-09-08 LAB
DIASTOLIC DYSFUNCTION: NO
ESTIMATED PA SYSTOLIC PRESSURE: 29.21
MITRAL VALVE MOBILITY: NORMAL
MITRAL VALVE REGURGITATION: NORMAL
RETIRED EF AND QEF - SEE NOTES: 60 (ref 55–65)
TRICUSPID VALVE REGURGITATION: NORMAL

## 2017-09-08 PROCEDURE — 93224 XTRNL ECG REC UP TO 48 HRS: CPT | Mod: S$GLB,,, | Performed by: INTERNAL MEDICINE

## 2017-09-08 PROCEDURE — 93306 TTE W/DOPPLER COMPLETE: CPT | Mod: S$GLB,,, | Performed by: INTERNAL MEDICINE

## 2017-09-08 NOTE — TELEPHONE ENCOUNTER
----- Message from Lacie Rothman MD sent at 9/8/2017 12:05 PM CDT -----  Mrs. Shaffer,  Please review results of your heart echo. It was normal and did not show any abnormal communications between the heart chambers. Chamber size and heart function was normal and pressures in the lungs were normal as well. Great news.

## 2017-09-08 NOTE — PROGRESS NOTES
Verified pt name and .  Pt signed consent stated that the monitor will be returned to 8th floor Cardiology at the [facility].  Pt prepped and EKG leads placed.  Pt to be on holter monitor for 24 hours.  Instructions, diary and extra EKG pads provided.

## 2017-09-11 ENCOUNTER — TELEPHONE (OUTPATIENT)
Dept: CARDIOLOGY | Facility: CLINIC | Age: 62
End: 2017-09-11

## 2017-09-11 NOTE — TELEPHONE ENCOUNTER
----- Message from Lacie Rothman MD sent at 9/11/2017  3:35 PM CDT -----  Mrs. Shaffer,  Please review results of your Holter monitor. It was normal and there was no evidence of any arrhythmia that would lead to your symptoms.  Again great results. At this point I would not recommend any further cardia studies. Please follow with your PCP and Dr. Araya.

## 2017-09-30 DIAGNOSIS — Z96.653 STATUS POST TOTAL BILATERAL KNEE REPLACEMENT: ICD-10-CM

## 2017-09-30 RX ORDER — MELOXICAM 15 MG/1
TABLET ORAL
Qty: 30 TABLET | Refills: 0 | Status: SHIPPED | OUTPATIENT
Start: 2017-09-30 | End: 2017-11-05 | Stop reason: SDUPTHER

## 2017-10-16 ENCOUNTER — RESEARCH ENCOUNTER (OUTPATIENT)
Dept: RESEARCH | Facility: HOSPITAL | Age: 62
End: 2017-10-16

## 2017-10-16 NOTE — PROGRESS NOTES
"[employer] Vaccine Trial [id number]  IRB# [id number].C/ P.I.: Yumiko  Subject # [id number]    Subject called site on 10/16/17 at approximated 12:05 and spoke with , Radha Webb, regarding her recent outpatient surgery. Subject stated she had a same day surgery on 10/4/17 to have her Right Index Finger Tip amputated at another medical facility that was not [facility]. Site already aware of history of finger situation. Subject did state her doctor started her on and antibiotic, Ciprofloxacin on "the Monday after the surgery took place," which would be 10/9/17 and is still currently taking the medicine as of 10/16/17. Pt also reported taking the pain medicine "norco" as needed starting on the day of surgery, 10/4/17 and is still currently taking. Subject stated she is going to a follow-up doctors appointment tomorrow, 10/17/17 and will contact site if any medication changes. Subject confirmed no LSE since discharge, no other antibiotics, no other ER visits or hospitalizations, no new vaccines or probiotics. "If anything I am having the exact opposite of diarrhea from the pain meds." per patient statement regarding loose stool episodes. Site reminded patient if she has any changes in health or starts to experience loose stool to call site as soon as possible as well as to collect 3rd stool within the 24hr period. Site will follow-up with subject in a few weeks.             "

## 2017-10-17 ENCOUNTER — PATIENT MESSAGE (OUTPATIENT)
Dept: PAIN MEDICINE | Facility: CLINIC | Age: 62
End: 2017-10-17

## 2017-10-18 ENCOUNTER — TELEPHONE (OUTPATIENT)
Dept: PAIN MEDICINE | Facility: CLINIC | Age: 62
End: 2017-10-18

## 2017-10-18 DIAGNOSIS — M17.12 OSTEOARTHRITIS OF LEFT KNEE, UNSPECIFIED OSTEOARTHRITIS TYPE: Primary | ICD-10-CM

## 2017-11-05 DIAGNOSIS — Z96.653 STATUS POST TOTAL BILATERAL KNEE REPLACEMENT: ICD-10-CM

## 2017-11-06 DIAGNOSIS — N39.41 URGE INCONTINENCE: ICD-10-CM

## 2017-11-06 DIAGNOSIS — R39.15 URINARY URGENCY: ICD-10-CM

## 2017-11-06 RX ORDER — TROSPIUM CHLORIDE 20 MG/1
20 TABLET, FILM COATED ORAL 2 TIMES DAILY
Qty: 60 TABLET | Refills: 0 | Status: SHIPPED | OUTPATIENT
Start: 2017-11-06 | End: 2018-09-06

## 2017-11-06 RX ORDER — MELOXICAM 15 MG/1
TABLET ORAL
Qty: 30 TABLET | Refills: 0 | Status: SHIPPED | OUTPATIENT
Start: 2017-11-06 | End: 2018-01-25

## 2017-12-04 ENCOUNTER — TELEPHONE (OUTPATIENT)
Dept: PAIN MEDICINE | Facility: HOSPITAL | Age: 62
End: 2017-12-04

## 2017-12-06 ENCOUNTER — TELEPHONE (OUTPATIENT)
Dept: INTERNAL MEDICINE | Facility: CLINIC | Age: 62
End: 2017-12-06

## 2017-12-06 DIAGNOSIS — K25.7 CHRONIC GASTRIC ULCER WITHOUT HEMORRHAGE AND WITHOUT PERFORATION: ICD-10-CM

## 2017-12-06 NOTE — TELEPHONE ENCOUNTER
Outside records from [facility] per Dr Dalton Herrera  Non bleeding gastric ulcers, biopsied- path pending  Esophagitis  Given pantoprazole

## 2017-12-07 ENCOUNTER — TELEPHONE (OUTPATIENT)
Dept: PAIN MEDICINE | Facility: CLINIC | Age: 62
End: 2017-12-07

## 2017-12-07 NOTE — TELEPHONE ENCOUNTER
Spoke with patient regarding message left for staff. Patient stated that she was rescheduled for her procedure but was not rescheduled for her f/u appt. Patient verbalized and confirmed appt date and time of arrival on 12/27/17 at 915 AM.

## 2017-12-07 NOTE — TELEPHONE ENCOUNTER
----- Message from Evon Lara sent at 12/7/2017  8:17 AM CST -----  Contact: [phone]/ self   Pt requesting to speak with you in regarding her procedure coming up  . Please advise

## 2017-12-12 NOTE — DISCHARGE INSTRUCTIONS
Home Care Instructions Pain Management:    1.  DIET:    You may resume your normal diet today.    2.  BATHING:    You may shower with luke warm water.    3.  DRESSING:    You may remove your bandage today.    4.  ACTIVITY LEVEL:      You may resume your normal activities 24 hours after your procedure.    5.  MEDICATIONS:    You may resume your normal medications today.    6.  SPECIAL INSTRUCTIONS:    No heat to the injection site for 24 hours including bath or shower, heating pad, moist heat or hot tubs.    Use an ice pack to the injection site for any pain or discomfort.  Apply ice packs for 20 minute intervals as needed.    If you have received any sedatives by mouth today, you can not drive for 12 hours.    If you have received sedation through an IV, you can not drive for 24 hours.    PLEASE CALL YOUR DOCTOR FOR THE FOLLOWIN.  Redness or swelling around the injection site.  2.  Fever of 101 degrees.  3.  Drainage (pus) from the injection site.  4.  For any continuous bleeding (some dried blood over the incision is normal.)    FOR EMERGENCIES:    If any unusual problems or difficulties occur during clinic hours, call [phone] or dial [phone].    Follow up with with your physician in 2-3 weeks.     Procedural Sedation  Procedural sedation is medicine to ease discomfort, pain, and anxiety during a procedure. The medicine is often given through an IV (intravenous) line in your arm or hand. In some cases, the medicine may be taken by mouth or inhaled. While you are under sedation, you will likely be awake. But you may not remember anything afterward.  Why procedural sedation is used  Sedation is used for many types of procedures. The goal is to reduce pain, anxiety, and stressful memories of a procedure. It can also help your healthcare provider treat you. For example, having a broken bone fixed may be easier if you feel relaxed.  Procedural sedation is used only for short, basic procedures. It is not used  for complex surgeries. Some procedures that use this type of sedation include:  · Dental surgery  · Breast biopsy, to take a sample of breast tissue  · Endoscopy, to look at gastrointestinal problems  · Bronchoscopy, to check for lung problems  · Bone or joint realignment, to fix a broken bone or dislocated joint  · Minor foot or skin surgery  · Electrical cardioversion, to restore a normal heart rhythm  · Lumbar puncture, to assess neurological disease  Risks of procedural sedation  Procedural sedation has some risks and possible side effects, such as:  · Headache  · Nausea and vomiting  · Unpleasant memory of the procedure  · Lowered rate of breathing  · Changes in heart rate and blood pressure (rare)  · Inhalation of stomach contents into your lungs (rare)  Side effects will likely go away shortly after the procedure. Your healthcare team will watch your heart rate and breathing during your sedation. This is to help prevent problems.  Your own risks may vary based on your age and your overall health. They also depend on the type of sedation you are given. Talk with your healthcare provider about the risks that apply most to you.  Getting ready for procedural sedation  Talk with your healthcare provider about how to get ready for your procedure. Tell him or her about all the medicines you take. This includes over-the-counter medicines such as ibuprofen. It also includes vitamins, herbs, and other supplements. You may need to stop taking some medicines before the procedure, such as blood thinners and aspirin. If you smoke, you should stop to lessen the chance of a lung issue. Talk with your healthcare provider if you need help to stop smoking.  Tell your healthcare provider if you:  · Have had any problems in the past with sedation or anesthesia  · Have had any recent changes in your health, such as an infection or fever  · Are pregnant or think you may be pregnant  Also be sure to:  · Ask a family member or friend  to take you home after the procedure. You cant drive on the day you receive sedation.  · Not eat or drink after midnight the night before your procedure, if advised.  · Not plan on making any important decisions, such as financial or legal, for the day after you receive the sedation.  · Follow all other instructions from your healthcare provider.  During your procedural sedation  You may have your procedure in a hospital or a medical clinic. Sedation is done by a trained healthcare provider. In general, you can expect the following:  · You will be given medicine through an IV line in your arm or hand. Or you may receive a shot. The medicine may also be given by mouth. Or you may inhale it through a mask.  · If you receive medicine through an IV, you may feel the effects very quickly. You will start to feel relaxed and drowsy.  · During the procedure, your heart rate, breathing, and blood pressure will be closely watched. Your breathing and blood pressure may decrease a little. But you will likely not need help with your breathing. You may receive a little extra oxygen through a mask or through some soft plastic prongs underneath your nose.  · You will probably be awake the entire time. If you do fall asleep, you should be easy to wake up, if needed. You should feel little or no pain.  · When your procedure is over, the sedative medicine will be stopped.  After your procedural sedation  You will begin to feel more awake and aware. But you will likely be drowsy for a while afterward. You will be closely watched as you become more alert. You may have a faint memory of the procedure. Or you may not remember it at all.  You should be able to return home within an hour or two after your procedure. Plan to have someone stay with you for a few hours. Side effects such as headache and nausea may go away quickly. Tell your healthcare provider if they continue.  Dont drive or make any important decisions for at least 24  hours. Be sure to follow all after-care instructions.     When to call your healthcare provider  Have someone call your healthcare provider right away if you have any of these:  · Drowsiness that gets worse  · Weakness or dizziness that gets worse  · Repeated vomiting  · You cant be awakened  · Severe or ongoing pain from the procedure, not relieved by the pain medicine   Date Last Reviewed: 2/1/2017  © 6410-6118 [employer]. [address] [place] [address], [place] [address]. All rights reserved. This information is not intended as a substitute for professional medical care. Always follow your healthcare professional's instructions.

## 2017-12-13 ENCOUNTER — HOSPITAL ENCOUNTER (OUTPATIENT)
Facility: HOSPITAL | Age: 62
Discharge: HOME | End: 2017-12-13
Attending: ANESTHESIOLOGY | Admitting: ANESTHESIOLOGY
Payer: COMMERCIAL

## 2017-12-13 ENCOUNTER — SURGERY (OUTPATIENT)
Age: 62
End: 2017-12-13

## 2017-12-13 VITALS
TEMPERATURE: 98 F | HEIGHT: 64 IN | SYSTOLIC BLOOD PRESSURE: 147 MMHG | DIASTOLIC BLOOD PRESSURE: 89 MMHG | WEIGHT: 175 LBS | OXYGEN SATURATION: 95 % | RESPIRATION RATE: 14 BRPM | BODY MASS INDEX: 29.88 KG/M2 | HEART RATE: 69 BPM

## 2017-12-13 PROCEDURE — 25000003 PHARM REV CODE 250: Performed by: ANESTHESIOLOGY

## 2017-12-13 PROCEDURE — 63600175 PHARM REV CODE 636 W HCPCS: Performed by: ANESTHESIOLOGY

## 2017-12-13 PROCEDURE — 99152 MOD SED SAME PHYS/QHP 5/>YRS: CPT | Mod: ,,, | Performed by: ANESTHESIOLOGY

## 2017-12-13 PROCEDURE — 64640 INJECTION TREATMENT OF NERVE: CPT | Mod: LT,,, | Performed by: ANESTHESIOLOGY

## 2017-12-13 PROCEDURE — 64640 INJECTION TREATMENT OF NERVE: CPT | Performed by: ANESTHESIOLOGY

## 2017-12-13 RX ORDER — LIDOCAINE HYDROCHLORIDE 10 MG/ML
INJECTION INFILTRATION; PERINEURAL
Status: DISCONTINUED | OUTPATIENT
Start: 2017-12-13 | End: 2017-12-13 | Stop reason: HOSPADM

## 2017-12-13 RX ORDER — FENTANYL CITRATE 50 UG/ML
INJECTION, SOLUTION INTRAMUSCULAR; INTRAVENOUS
Status: DISCONTINUED | OUTPATIENT
Start: 2017-12-13 | End: 2017-12-13 | Stop reason: HOSPADM

## 2017-12-13 RX ORDER — BUPIVACAINE HYDROCHLORIDE 2.5 MG/ML
INJECTION, SOLUTION EPIDURAL; INFILTRATION; INTRACAUDAL
Status: DISCONTINUED | OUTPATIENT
Start: 2017-12-13 | End: 2017-12-13 | Stop reason: HOSPADM

## 2017-12-13 RX ORDER — TRIAMCINOLONE ACETONIDE 40 MG/ML
INJECTION, SUSPENSION INTRA-ARTICULAR; INTRAMUSCULAR
Status: DISCONTINUED | OUTPATIENT
Start: 2017-12-13 | End: 2017-12-13 | Stop reason: HOSPADM

## 2017-12-13 RX ORDER — MIDAZOLAM HYDROCHLORIDE 1 MG/ML
INJECTION INTRAMUSCULAR; INTRAVENOUS
Status: DISCONTINUED | OUTPATIENT
Start: 2017-12-13 | End: 2017-12-13 | Stop reason: HOSPADM

## 2017-12-13 RX ORDER — SODIUM CHLORIDE, SODIUM LACTATE, POTASSIUM CHLORIDE, CALCIUM CHLORIDE 600; 310; 30; 20 MG/100ML; MG/100ML; MG/100ML; MG/100ML
INJECTION, SOLUTION INTRAVENOUS CONTINUOUS
Status: DISCONTINUED | OUTPATIENT
Start: 2017-12-13 | End: 2017-12-13 | Stop reason: HOSPADM

## 2017-12-13 RX ADMIN — FENTANYL CITRATE 50 MCG: 50 INJECTION, SOLUTION INTRAMUSCULAR; INTRAVENOUS at 02:12

## 2017-12-13 RX ADMIN — BUPIVACAINE HYDROCHLORIDE 5 ML: 2.5 INJECTION, SOLUTION EPIDURAL; INFILTRATION; INTRACAUDAL; PERINEURAL at 02:12

## 2017-12-13 RX ADMIN — MIDAZOLAM HYDROCHLORIDE 2 MG: 1 INJECTION INTRAMUSCULAR; INTRAVENOUS at 02:12

## 2017-12-13 RX ADMIN — SODIUM CHLORIDE, SODIUM LACTATE, POTASSIUM CHLORIDE, AND CALCIUM CHLORIDE: .6; .31; .03; .02 INJECTION, SOLUTION INTRAVENOUS at 01:12

## 2017-12-13 RX ADMIN — LIDOCAINE HYDROCHLORIDE 5 ML: 10 INJECTION, SOLUTION INFILTRATION; PERINEURAL at 02:12

## 2017-12-13 RX ADMIN — TRIAMCINOLONE ACETONIDE 40 MG: 40 INJECTION, SUSPENSION INTRA-ARTICULAR; INTRAMUSCULAR at 02:12

## 2017-12-13 NOTE — OP NOTE
Patient Name: Shanda Shaffer  MRN: [id number]    INFORMED CONSENT: The procedure, risks, benefits and options were discussed with patient. There are no contraindications to the procedure. The patient expressed understanding and agreed to proceed. The personnel performing the procedure was discussed. I verify that I personally obtained Shanda's consent prior to the start of the procedure and the signed consent can be found on the patient's chart.        Procedure Date: 12/13/2017  Anesthesia:   systemic  Pre Procedure diagnosis:  CHRONIC KNEE PAIN   Post-Procedure diagnosis: SAME    Sedation: Yes - Fentanyl 100 mcg and Midazolam 2 mg        PROCEDURE:  LEFT GENICULAR NERVE COOLED RADIOFREQUENCY ABLATION RFA  DESCRIPTION OF PROCEDURE: The patient was brought to the fluoroscopy suite. IV access was obtained prior to the procedure. The patient was positioned supine on the fluoroscopy table. Continuous hemodynamic monitoring was initiated including blood pressure, EKG, and pulse oximetry. The area of the around the knee joint was prepped with chlorhexidine three times and draped into a sterile field. Skin anesthesia was achieved using Lidocaine 1% over the respective injection sites. A 17 gauge 3.5 inch COOLED RF needle was slowly inserted and advanced to the junction of the lateral femoral and the epicondyle while contacting periosteum to block the superior lateral genicular nerve using the AP and lateral fluoroscopic imaging. Same process was repeated at the junction of the medial femoral shaft and the epicondyle to block the superior medial genicular nerve. The same process was repeated again using a 3rd needle which was advanced at the junction of the medial tibial plateau and the epicondyle to block the inferior medial genicular nerve. Using AP fluoroscopy the position of all 3 needles was confirmed. Afterwards lateral fluoroscopic images were obtained and the needles were adjusted to lay in the middle of the  diaphysis.Negative aspiration for blood. 3 cc of lidocaine 1% was injected at each needle.Cooled RFA lesioning was done at each nerve at 60 degrees ( eventual delivered temperature is 80 degrees) for 2 minutes and 30 seconds each. A combination of 2cc of Bupivacaine 0.25% and 13mg of kenalog was injected at each needle location to block all targeted nerves.. There was no pain on injection. The needle was removed and bleeding was nil. A sterile dressing was applied. No specimens collected. PATIENT was taken to the Post-block Recovery Area for further observation.     Pre Procedure Pain Level: 5/10  Post-Procedure Pain Level: 0/10        Discharge Diagnosis: Osteoarthritis of left knee, unspecified osteoarthritis type [M17.12]  Condition on Discharge: Stable.  Diet on Discharge: Same as before.  Activity: as per instruction sheet.  Discharge to: Home with a responsible adult.  Follow up: as per Discharge instructions

## 2017-12-13 NOTE — H&P (VIEW-ONLY)
SUBJECTIVE:     Shanda Shaffer presents with left knee pain. Pain intensity is 7/10      Pain Medications:     - Opioids: Tramadol  - Adjuvant Medications: Mobic (Meloxicam), Voltaren gel  - Anti-Coagulants: None  - Others: see medications list     Opioid Contract: no      report:  Reviewed and consistent with medication use as prescribed.     Pain Procedures: 7/12/17 left GENICULAR NERVE BLOCK  Bilateral Total knee replacement with Dr. Miller 2/2016     Physical Therapy/Home Exercise: no     Imaging: CT Lower Extremity Without Contrast Left 3/31/17  Narrative      Findings: There is a TKA in place good alignment no complications. There is normal rotation. No fracture dislocation bone destruction loosening or infection is seen. No joint space narrowing seen. No soft tissue masses are seen.   Impression       No significant abnormality seen.      Electronically signed by: ASHLEY MI  Date: 04/01/17  Time: 09:42              X-Ray Hip to Ankle 6/7/17  Narrative      Findings: There are bilateral TKAs in place.  There are mild genu valgus deformities.  No fracture dislocation bone destruction seen.      Electronically signed by: ASHLEY MI  Date: 06/07/17  Time: 09:01             Allergies:         Review of patient's allergies indicates:   Allergen Reactions    Percocet [oxycodone-acetaminophen] Nausea Only       Severe nausea with inability to eat.         Current Medications:   Current Medications          Current Outpatient Prescriptions   Medication Sig Dispense Refill    diclofenac sodium 1 % Gel APPLY 2 GRAMS TOPICALLY 4 TIMES DAILY 1 Tube 2    estradiol (ESTRACE) 0.01 % (0.1 mg/gram) vaginal cream Place 1 g vaginally once daily. 42.5 g 1    fluconazole (DIFLUCAN) 150 MG Tab          lidocaine HCL 2% (XYLOCAINE) 2 % jelly Apply to affected area  prn 30 mL 3    losartan-hydrochlorothiazide 100-25 mg (HYZAAR) 100-25 mg per tablet TAKE 1 TABLET ONCE DAILY 90 tablet 0     losartan-hydrochlorothiazide 100-25 mg (HYZAAR) 100-25 mg per tablet Take 1 tablet by mouth once daily. 90 tablet 2    meloxicam (MOBIC) 15 MG tablet Take 1 tablet (15 mg total) by mouth once daily. 30 tablet 0    metoprolol succinate (TOPROL-XL) 50 MG 24 hr tablet Take 1 tablet (50 mg total) by mouth once daily. 90 tablet 3    mupirocin calcium 2% (BACTROBAN) 2 % cream          nystatin-triamcinolone (MYCOLOG II) cream APPLY  CREAM TOPICALLY TO AFFECTED AREA TWICE DAILY 1 Tube 0    nystatin-triamcinolone (MYCOLOG II) cream APPLY  CREAM TOPICALLY TO AFFECTED AREA TWICE DAILY 1 Tube 2    PREPOPIK 10 mg-3.5 gram-12 gram PwPk          tramadol (ULTRAM) 50 mg tablet Take 1 tablet (50 mg total) by mouth every 8 (eight) hours as needed for Pain. 60 tablet 0    valacyclovir (VALTREX) 1000 MG tablet          trospium (SANCTURA) 20 mg Tab tablet Take 1 tablet (20 mg total) by mouth 2 (two) times daily. 180 tablet 3      No current facility-administered medications for this visit.             REVIEW OF SYSTEMS:     GENERAL:  No weight loss, malaise or fevers.  HEENT:  Negative for frequent or significant headaches.  NECK:  Negative for lumps, goiter, pain and significant neck swelling.  RESPIRATORY:  Negative for cough, wheezing or shortness of breath.  CARDIOVASCULAR:  Negative for chest pain, leg swelling or palpitations.  GI:  Negative for abdominal discomfort, blood in stools or black stools or change in bowel habits.  MUSCULOSKELETAL:  See HPI.  SKIN:  Negative for lesions, rash, and itching.  PSYCH:  Negative for sleep disturbance, mood disorder and recent psychosocial stressors.  HEMATOLOGY/LYMPHOLOGY:  Negative for prolonged bleeding, bruising easily or swollen nodes.  NEURO:   No history of headaches, syncope, paralysis, seizures or tremors.  All other reviewed and negative other than HPI.     Past Medical History:  Past Medical History:   Diagnosis Date    Acne vulgaris 12/16/2015    Benign hypertension  "2015    Bladder prolapse, female, acquired 2015    Diverticulosis of large intestine without hemorrhage 2015    Esophageal stricture 2015    Former smoker: 1 pack a day for 30 years, quit 3/11/10 3/17/2017    Gastroesophageal reflux disease without esophagitis 2015    Hyperlipidemia      Non morbid obesity due to excess calories 2015    Primary osteoarthritis of both knees 2015         Past Surgical History:        Past Surgical History:   Procedure Laterality Date    bilateral knee surgery   2016    BREAST SURGERY         cyst removal     SECTION         x 4    KNEE SURGERY Bilateral 2016     TK    TONSILLECTOMY             Family History:         Family History   Problem Relation Age of Onset    Diabetes Mother      Heart disease Mother      Arthritis Mother      Hypertension Father      Heart disease Father         PPM    Diabetes Sister      Heart disease Sister         valve replacement    Diabetes Brother      Arthritis Brother         RA    No Known Problems Son      Cancer Maternal Grandmother         breast    No Known Problems Son      No Known Problems Son      No Known Problems Son           Social History:  Social History            Social History    Marital status:        Spouse name: N/A    Number of children: 4    Years of education: N/A             Social History Main Topics    Smoking status: Former Smoker       Packs/day: 1.00       Years: 30.00       Types: Cigarettes       Quit date: 3/11/2010    Smokeless tobacco: Never Used    Alcohol use Yes         Comment: occasional    Drug use: No    Sexual activity: Not Currently           Other Topics Concern    None          Social History Narrative    None         OBJECTIVE:     BP (!) 168/89 (BP Location: Left arm, Patient Position: Lying)   Pulse 71   Temp 98.5 °F (36.9 °C) (Oral)   Resp 16   Ht 5' 4" (1.626 m)   Wt 78.9 kg (174 lb)   SpO2 " 99%   Breastfeeding? No   BMI 29.87 kg/m²        PHYSICAL EXAMINATION:     General appearance: Well appearing, in no acute distress, alert and oriented x3.  Psych:  Mood and affect appropriate.  Skin: Scar of previous bilateral TKR Skin color, texture, turgor normal, no rashes or lesions, in both upper and lower body.  Head/face:  Normocephalic, atraumatic. No palpable lymph nodes.  Neck: No pain to palpation over the cervical paraspinous muscles. Spurling Negative. No pain with neck flexion, extension, or lateral flexion.   Cor: RRR  Pulm: CTA  Back: Straight leg raising in the sitting and supine positions is negative to radicular pain. No pain to palpation over the spine or costovertebral angles. Normal range of motion without pain reproduction.  Extremities: + Pain on ROM of the left knee Peripheral joint ROM is full and pain free without obvious instability or laxity in all four extremities. No deformities, edema, or skin discoloration. Good capillary refill.  Musculoskeletal: Shoulder, hip, sacroiliac and knee provocative maneuvers are negative. Bilateral upper and lower extremity strength is normal and symmetric.  No atrophy or tone abnormalities are noted.  Neuro: Bilateral upper and lower extremity coordination and muscle stretch reflexes are physiologic and symmetric + except patellar reflex which is absent   Plantar response are downgoing. No loss of sensation is noted.  Gait: antalgic         ASSESSMENT: 62 y.o. year old female with left knee pain, consistent with knee OA and chronic knee pain .  She had left GENICULAR NERVE BLOCK on 7/12/17 with 70% relief for 5-6 days        1. Chronic pain of left knee      2. Primary osteoarthritis of left knee               PLAN:      - S/F a Left knee Genicular  Nerve  RFA for longer pain relief        Cesar Rose MD  12/6/2017

## 2017-12-15 ENCOUNTER — TELEPHONE (OUTPATIENT)
Dept: PAIN MEDICINE | Facility: HOSPITAL | Age: 62
End: 2017-12-15

## 2017-12-19 DIAGNOSIS — M25.562 PAIN IN BOTH KNEES, UNSPECIFIED CHRONICITY: Primary | ICD-10-CM

## 2017-12-19 DIAGNOSIS — M25.561 PAIN IN BOTH KNEES, UNSPECIFIED CHRONICITY: Primary | ICD-10-CM

## 2017-12-20 ENCOUNTER — OFFICE VISIT (OUTPATIENT)
Dept: ORTHOPEDICS | Facility: CLINIC | Age: 62
End: 2017-12-20
Payer: COMMERCIAL

## 2017-12-20 ENCOUNTER — HOSPITAL ENCOUNTER (OUTPATIENT)
Dept: RADIOLOGY | Facility: HOSPITAL | Age: 62
Discharge: HOME | End: 2017-12-20
Attending: ORTHOPAEDIC SURGERY
Payer: COMMERCIAL

## 2017-12-20 VITALS — HEIGHT: 64 IN | BODY MASS INDEX: 30.93 KG/M2 | WEIGHT: 181.19 LBS

## 2017-12-20 DIAGNOSIS — M25.562 CHRONIC PAIN OF LEFT KNEE: ICD-10-CM

## 2017-12-20 DIAGNOSIS — Z96.659 PAINFUL TOTAL KNEE REPLACEMENT, SEQUELA: Primary | ICD-10-CM

## 2017-12-20 DIAGNOSIS — T84.84XS PAINFUL TOTAL KNEE REPLACEMENT, SEQUELA: Primary | ICD-10-CM

## 2017-12-20 DIAGNOSIS — M25.562 PAIN IN BOTH KNEES, UNSPECIFIED CHRONICITY: ICD-10-CM

## 2017-12-20 DIAGNOSIS — I73.9 PVD (PERIPHERAL VASCULAR DISEASE): Primary | ICD-10-CM

## 2017-12-20 DIAGNOSIS — G89.29 CHRONIC PAIN OF LEFT KNEE: ICD-10-CM

## 2017-12-20 DIAGNOSIS — M25.561 PAIN IN BOTH KNEES, UNSPECIFIED CHRONICITY: ICD-10-CM

## 2017-12-20 PROCEDURE — 73562 X-RAY EXAM OF KNEE 3: CPT | Mod: 26,50,, | Performed by: RADIOLOGY

## 2017-12-20 PROCEDURE — 99999 PR PBB SHADOW E&M-EST. PATIENT-LVL II: CPT | Mod: PBBFAC,,, | Performed by: ORTHOPAEDIC SURGERY

## 2017-12-20 PROCEDURE — 99213 OFFICE O/P EST LOW 20 MIN: CPT | Mod: S$GLB,,, | Performed by: ORTHOPAEDIC SURGERY

## 2017-12-20 PROCEDURE — 73562 X-RAY EXAM OF KNEE 3: CPT | Mod: TC,50

## 2017-12-20 NOTE — PROGRESS NOTES
"Subjective:      Patient ID: Shanda Shaffer is a 62 y.o. female.    Chief Complaint: Pain of the Left Knee and Pain of the Right Knee    HPI  Shanda Shaffer has left knee pain.  The pain has improved since the nerve ablation.  SHe recently had a finger tip amputation. The pain is located in the anterior aspect of the knee.  There  is not radiation.    There is not associated stiffness.   There is not catching and locking. The pain is described as achy. The pain is aggravated by stairs.  It is alleviated by rest.  There is not associated back pain.  Her history, medications and problem list were reviewed.    Review of Systems   Constitution: Negative for chills, fever and night sweats.   HENT: Negative for hearing loss.    Eyes: Negative for blurred vision and double vision.   Cardiovascular: Negative for chest pain, claudication and leg swelling.   Respiratory: Negative for shortness of breath.    Endocrine: Negative for polydipsia, polyphagia and polyuria.   Hematologic/Lymphatic: Negative for adenopathy and bleeding problem. Does not bruise/bleed easily.   Skin: Negative for poor wound healing.   Musculoskeletal: Positive for joint pain.   Gastrointestinal: Negative for diarrhea and heartburn.   Genitourinary: Negative for bladder incontinence.   Neurological: Negative for focal weakness, headaches, numbness, paresthesias and sensory change.   Psychiatric/Behavioral: The patient is not nervous/anxious.    Allergic/Immunologic: Negative for persistent infections.         Objective:      Body mass index is 31.11 kg/m².  Vitals:    12/20/17 1512   Weight: 82.2 kg (181 lb 3.5 oz)   Height: 5' 4" (1.626 m)           General    Constitutional: She is oriented to person, place, and time. She appears well-developed and well-nourished.   HENT:   Head: Normocephalic and atraumatic.   Eyes: EOM are normal.   Cardiovascular: Normal rate.    Pulmonary/Chest: Effort normal.   Neurological: She is alert and oriented to " person, place, and time.   Psychiatric: She has a normal mood and affect. Her behavior is normal.     General Musculoskeletal Exam   Gait: normal       Right Knee Exam     Inspection   Erythema: absent  Scars: absent  Swelling: absent  Effusion: effusion  Deformity: deformity  Bruising: absent    Tenderness   The patient is experiencing no tenderness.         Range of Motion   Extension: 0   Flexion: 130     Tests   Ligament Examination Lachman: normal (-1 to 2mm)   MCL - Valgus: normal (0 to 2mm)  LCL - Varus: normal  Patella   Passive Patellar Tilt: neutral    Other   Sensation: normal    Left Knee Exam     Inspection   Erythema: absent  Scars: absent  Swelling: absent  Effusion: absent  Deformity: deformity (mild valgus compared to right)  Bruising: absent    Tenderness   The patient tender to palpation of the patellar tendon.    Range of Motion   Extension: 0   Flexion: 130     Tests   Stability   Lachman: abnormal  - grade I  MCL - Valgus: normal (0 to 2mm)  LCL - Varus: normal (0 to 2mm)  Patella   Passive Patellar Tilt: neutral    Other   Sensation: normal    Muscle Strength   Right Lower Extremity   Hip Abduction: 5/5   Quadriceps:  5/5   Hamstrin/5   Left Lower Extremity   Hip Abduction: 5/5   Quadriceps:  5/5   Hamstrin/5     Reflexes     Left Side  Quadriceps:  2+    Right Side   Quadriceps:  2+    Vascular Exam     Right Pulses  Dorsalis Pedis:      2+          Left Pulses  Dorsalis Pedis:      2+          Edema  Right Lower Leg: absent  Left Lower Leg: absent      Radiographs taken today were reviewed by me.  There is a prosthetic replacement of the bilateral knee(s).  The prosthesis is well positioned.  There is not evidence of bone loss, osteolysis, or loosening. There is not a fracture.            Assessment:       Encounter Diagnoses   Name Primary?    Painful total knee replacement, sequela Yes    Chronic pain of left knee           Plan:       Shanda was seen today for pain and  pain.    Diagnoses and all orders for this visit:    Painful total knee replacement, sequela    Chronic pain of left knee        She is somewhat improved.  WIll see if this continues.  F/U in three months with xrays.  Sooner if needed.

## 2017-12-21 ENCOUNTER — PATIENT MESSAGE (OUTPATIENT)
Dept: VASCULAR SURGERY | Facility: CLINIC | Age: 62
End: 2017-12-21

## 2017-12-21 ENCOUNTER — TELEPHONE (OUTPATIENT)
Dept: VASCULAR SURGERY | Facility: CLINIC | Age: 62
End: 2017-12-21

## 2017-12-21 NOTE — TELEPHONE ENCOUNTER
----- Message from Radha Lawrence sent at 12/20/2017  8:07 AM CST -----  Contact: self   Shanda Called and wanted to schedule a appointment with  for today before 2pm And also had general questions. Please call Shanda @ [phone] . Thanks :)

## 2017-12-21 NOTE — NURSING
"Spoke with patient in detail for approx 60 minutes on today and yesterday, advised per Dr Araya orders obtained for have her see, Rheumatology, Hematology, schedule DENISE as outpatient and obtain TOYA and Rheumatoid Factor. Patient had labs drawn on yesterday and DENISE scheduled for 12/28/2017. Explained to patient that a nurse navigator for Hematology would give her a call to number listed to schedule her with a physician (Dr Ramya Simmons), and provided her with Rheumatology Clinic phone number for her to call and schedule, due to that department does not do outbound calls to schedule appointment, number provided. Patient verbalized understanding of above and stated "I will call Dr Araya office to schedule an appointment to go over all my results once I have been seen and evaluated by the physicians and my tests are all completed". Dr Araya made aware that appointment and referrals have been scheduled.   "

## 2017-12-22 ENCOUNTER — PATIENT MESSAGE (OUTPATIENT)
Dept: VASCULAR SURGERY | Facility: CLINIC | Age: 62
End: 2017-12-22

## 2017-12-22 ENCOUNTER — TELEPHONE (OUTPATIENT)
Dept: HEMATOLOGY/ONCOLOGY | Facility: CLINIC | Age: 62
End: 2017-12-22

## 2017-12-22 ENCOUNTER — TELEPHONE (OUTPATIENT)
Dept: INTERNAL MEDICINE | Facility: CLINIC | Age: 62
End: 2017-12-22

## 2017-12-22 DIAGNOSIS — I73.9 PVD (PERIPHERAL VASCULAR DISEASE): Primary | ICD-10-CM

## 2017-12-22 DIAGNOSIS — I75.011 ATHEROEMBOLISM OF RIGHT UPPER EXTREMITY: ICD-10-CM

## 2017-12-22 NOTE — TELEPHONE ENCOUNTER
Spoke to patient and states that she saw Dr. Araya because her fingers were cold/turning purple---Dr. Araya did blood test and states that she may have Lupus----they have referred her to Rheumatology and has an appt with Dr. Moncada 2/12/18---patient is wanting to know if Dr. Peres can get her in sooner than that---pls advise

## 2017-12-22 NOTE — TELEPHONE ENCOUNTER
----- Message from Marshall Chappell sent at 12/22/2017  8:40 AM CST -----  Contact: patient   Patient called in requesting to speak with Dr. Peres's nurse. Patient's contact number is [phone]. Thank You.

## 2017-12-23 ENCOUNTER — PATIENT MESSAGE (OUTPATIENT)
Dept: OBSTETRICS AND GYNECOLOGY | Facility: CLINIC | Age: 62
End: 2017-12-23

## 2017-12-23 DIAGNOSIS — N95.2 VAGINAL ATROPHY: Primary | ICD-10-CM

## 2017-12-26 ENCOUNTER — INITIAL CONSULT (OUTPATIENT)
Dept: HEMATOLOGY/ONCOLOGY | Facility: CLINIC | Age: 62
End: 2017-12-26
Payer: COMMERCIAL

## 2017-12-26 ENCOUNTER — LAB VISIT (OUTPATIENT)
Dept: LAB | Facility: HOSPITAL | Age: 62
End: 2017-12-26
Attending: INTERNAL MEDICINE
Payer: COMMERCIAL

## 2017-12-26 VITALS
HEART RATE: 66 BPM | OXYGEN SATURATION: 98 % | HEIGHT: 64 IN | DIASTOLIC BLOOD PRESSURE: 67 MMHG | TEMPERATURE: 98 F | BODY MASS INDEX: 31.09 KG/M2 | SYSTOLIC BLOOD PRESSURE: 137 MMHG | WEIGHT: 182.13 LBS

## 2017-12-26 DIAGNOSIS — I74.9 ARTERIAL THROMBOEMBOLISM: ICD-10-CM

## 2017-12-26 DIAGNOSIS — I74.9 ARTERIAL THROMBOEMBOLISM: Primary | ICD-10-CM

## 2017-12-26 DIAGNOSIS — I10 BENIGN HYPERTENSION: ICD-10-CM

## 2017-12-26 DIAGNOSIS — R76.8 POSITIVE ANA (ANTINUCLEAR ANTIBODY): ICD-10-CM

## 2017-12-26 DIAGNOSIS — E78.2 MIXED HYPERLIPIDEMIA: ICD-10-CM

## 2017-12-26 LAB
ALBUMIN SERPL BCP-MCNC: 3.8 G/DL
ALP SERPL-CCNC: 83 U/L
ALT SERPL W/O P-5'-P-CCNC: 25 U/L
ANION GAP SERPL CALC-SCNC: 8 MMOL/L
APTT BLDCRRT: 25 SEC
AST SERPL-CCNC: 21 U/L
BASOPHILS # BLD AUTO: 0.04 K/UL
BASOPHILS NFR BLD: 0.5 %
BILIRUB SERPL-MCNC: 0.6 MG/DL
BILIRUB UR QL STRIP: NEGATIVE
BUN SERPL-MCNC: 21 MG/DL
C3 SERPL-MCNC: 127 MG/DL
C4 SERPL-MCNC: 20 MG/DL
CALCIUM SERPL-MCNC: 9.6 MG/DL
CHLORIDE SERPL-SCNC: 102 MMOL/L
CLARITY UR REFRACT.AUTO: CLEAR
CO2 SERPL-SCNC: 28 MMOL/L
COLOR UR AUTO: YELLOW
CREAT SERPL-MCNC: 0.9 MG/DL
CRP SERPL-MCNC: 0.9 MG/L
DIFFERENTIAL METHOD: ABNORMAL
EOSINOPHIL # BLD AUTO: 0.2 K/UL
EOSINOPHIL NFR BLD: 2.3 %
ERYTHROCYTE [DISTWIDTH] IN BLOOD BY AUTOMATED COUNT: 13 %
ERYTHROCYTE [SEDIMENTATION RATE] IN BLOOD BY WESTERGREN METHOD: 22 MM/HR
EST. GFR  (AFRICAN AMERICAN): >60 ML/MIN/1.73 M^2
EST. GFR  (NON AFRICAN AMERICAN): >60 ML/MIN/1.73 M^2
GLUCOSE SERPL-MCNC: 96 MG/DL
GLUCOSE UR QL STRIP: NEGATIVE
HCT VFR BLD AUTO: 39.5 %
HGB BLD-MCNC: 13.3 G/DL
HGB UR QL STRIP: NEGATIVE
IMM GRANULOCYTES # BLD AUTO: 0.02 K/UL
IMM GRANULOCYTES NFR BLD AUTO: 0.2 %
INR PPP: 1
KETONES UR QL STRIP: NEGATIVE
LEUKOCYTE ESTERASE UR QL STRIP: NEGATIVE
LYMPHOCYTES # BLD AUTO: 2.1 K/UL
LYMPHOCYTES NFR BLD: 24.9 %
MCH RBC QN AUTO: 31.7 PG
MCHC RBC AUTO-ENTMCNC: 33.7 G/DL
MCV RBC AUTO: 94 FL
MONOCYTES # BLD AUTO: 0.6 K/UL
MONOCYTES NFR BLD: 7 %
NEUTROPHILS # BLD AUTO: 5.5 K/UL
NEUTROPHILS NFR BLD: 65.1 %
NITRITE UR QL STRIP: NEGATIVE
NRBC BLD-RTO: 0 /100 WBC
PH UR STRIP: 7 [PH] (ref 5–8)
PLATELET # BLD AUTO: 336 K/UL
PMV BLD AUTO: 9.3 FL
POTASSIUM SERPL-SCNC: 3.8 MMOL/L
PROT SERPL-MCNC: 7.3 G/DL
PROT UR QL STRIP: NEGATIVE
PROTHROMBIN TIME: 10.6 SEC
RBC # BLD AUTO: 4.19 M/UL
SODIUM SERPL-SCNC: 138 MMOL/L
SP GR UR STRIP: 1.01 (ref 1–1.03)
URN SPEC COLLECT METH UR: NORMAL
UROBILINOGEN UR STRIP-ACNC: NEGATIVE EU/DL
WBC # BLD AUTO: 8.38 K/UL

## 2017-12-26 PROCEDURE — 85598 HEXAGNAL PHOSPH PLTLT NEUTRL: CPT

## 2017-12-26 PROCEDURE — 81003 URINALYSIS AUTO W/O SCOPE: CPT

## 2017-12-26 PROCEDURE — 85651 RBC SED RATE NONAUTOMATED: CPT

## 2017-12-26 PROCEDURE — 85610 PROTHROMBIN TIME: CPT

## 2017-12-26 PROCEDURE — 86160 COMPLEMENT ANTIGEN: CPT

## 2017-12-26 PROCEDURE — 84165 PROTEIN E-PHORESIS SERUM: CPT | Mod: 26,,, | Performed by: PATHOLOGY

## 2017-12-26 PROCEDURE — 82595 ASSAY OF CRYOGLOBULIN: CPT

## 2017-12-26 PROCEDURE — 99999 PR PBB SHADOW E&M-EST. PATIENT-LVL III: CPT | Mod: PBBFAC,,, | Performed by: INTERNAL MEDICINE

## 2017-12-26 PROCEDURE — 86146 BETA-2 GLYCOPROTEIN ANTIBODY: CPT | Mod: 59

## 2017-12-26 PROCEDURE — 99245 OFF/OP CONSLTJ NEW/EST HI 55: CPT | Mod: S$GLB,,, | Performed by: INTERNAL MEDICINE

## 2017-12-26 PROCEDURE — 80053 COMPREHEN METABOLIC PANEL: CPT

## 2017-12-26 PROCEDURE — 85025 COMPLETE CBC W/AUTO DIFF WBC: CPT

## 2017-12-26 PROCEDURE — 84165 PROTEIN E-PHORESIS SERUM: CPT

## 2017-12-26 PROCEDURE — 86160 COMPLEMENT ANTIGEN: CPT | Mod: 59

## 2017-12-26 PROCEDURE — 86140 C-REACTIVE PROTEIN: CPT

## 2017-12-26 PROCEDURE — 86147 CARDIOLIPIN ANTIBODY EA IG: CPT

## 2017-12-26 PROCEDURE — 85730 THROMBOPLASTIN TIME PARTIAL: CPT

## 2017-12-26 RX ORDER — LEVOFLOXACIN 750 MG/1
TABLET ORAL
COMMUNITY
Start: 2017-12-18 | End: 2018-01-03

## 2017-12-26 NOTE — PROGRESS NOTES
HEMATOLOGY CONSULT NOTE    IDENTIFYING STATEMENT   Shanda Medrano) is a 62 y.o. female with a  of 1955 from [place], referred by Dr. Araya for evaluation of arterial thromboembolism.     HISTORY OF PRESENT ILLNESS:      Ms. Shaffer is 62 and presents for evaluation of recent arterial thromboembolism. She first sought medical attention for this problem in early August, when she went to urgent care after noticing her R 2nd digit had become cold and purple after a hangnail. She was referred to vascular surgery and saw Dr. Araya on  who considered the most likely etiology to be a cholesterol embolism. She had CTA of the chest showing some mild atherosclerosis of the aortic arch, but no aneurysmal dilation or dissection. Other studies sent included a serologic evaluation for causes of inherited thrombophilia, all of which were negative.     She was seen again on  and recommended to have R 2nd digit amputation. Since that time, she has developed new symptoms in the L hand, in the 2nd and 3rd digits, with small skin ulceration and coolness. She has some pain. She called Dr. Araya, who ordered additional testing. An TOYA was positive (1:1280 in a centromere pattern), so she was referred to hematology and rheumatology for further evaluation.    Ms. Shaffer otherwise feels well. She has no personal history of venous thromboembolic disease or family history. She has no prior hsitory of autoimmune disorders of which she is aware. She has not had any blood clots after surgeries in the past. She is  6, para 4, with 1 miscarriage, and 1 elective . No other family members have had miscarriages.      Past Medical History:   Diagnosis Date    Acne vulgaris 2015    Benign hypertension 2015    Bladder prolapse, female, acquired 2015    Diverticulosis of large intestine without hemorrhage 2015    Esophageal stricture 2015    Former smoker: 1 pack a day for 30  years, quit 3/11/10 3/17/2017    Gastroesophageal reflux disease without esophagitis 12/16/2015    Hyperlipidemia     Non morbid obesity due to excess calories 12/16/2015    Primary osteoarthritis of both knees 12/16/2015       Family History   Problem Relation Age of Onset    Diabetes Mother     Heart disease Mother     Arthritis Mother     Hypertension Father     Heart disease Father      PPM    Heart attack Father     Diabetes Sister     Heart disease Sister      valve replacement    Diabetes Brother     Arthritis Brother      RA    No Known Problems Son     Cancer Maternal Grandmother      breast    Stroke Maternal Grandmother     No Known Problems Son     No Known Problems Son     No Known Problems Son     Heart failure Neg Hx     Hyperlipidemia Neg Hx        Social History     Social History    Marital status:      Spouse name: N/A    Number of children: 4    Years of education: N/A     Occupational History    Not on file.     Social History Main Topics    Smoking status: Former Smoker     Packs/day: 1.00     Years: 30.00     Types: Cigarettes     Quit date: 3/11/2010    Smokeless tobacco: Never Used    Alcohol use Yes      Comment: occasional    Drug use: No    Sexual activity: Not Currently     Other Topics Concern    Not on file     Social History Narrative    No narrative on file         MEDICATIONS:     Current Outpatient Prescriptions on File Prior to Visit   Medication Sig Dispense Refill    aspirin (ECOTRIN) 81 MG EC tablet Take 2 tablets (162 mg total) by mouth once daily. 60 tablet 11    diclofenac sodium 1 % Gel APPLY 2 GRAMS TOPICALLY 4 TIMES DAILY 1 Tube 2    estradiol (ESTRACE) 0.01 % (0.1 mg/gram) vaginal cream Place 1 g vaginally once daily. 42.5 g 1    losartan-hydrochlorothiazide 100-25 mg (HYZAAR) 100-25 mg per tablet Take 1 tablet by mouth once daily. 90 tablet 2    meloxicam (MOBIC) 15 MG tablet TAKE ONE TABLET BY MOUTH ONCE DAILY 30 tablet 0  "   metoprolol succinate (TOPROL-XL) 50 MG 24 hr tablet Take 1 tablet (50 mg total) by mouth once daily. 90 tablet 3    pantoprazole (PROTONIX) 40 MG tablet Take 40 mg by mouth once daily.      simvastatin (ZOCOR) 20 MG tablet Take 1 tablet (20 mg total) by mouth every evening. 90 tablet 3    tramadol (ULTRAM) 50 mg tablet Take 1 tablet (50 mg total) by mouth every 8 (eight) hours as needed for Pain. 60 tablet 0    trospium (SANCTURA) 20 mg Tab tablet Take 1 tablet (20 mg total) by mouth 2 (two) times daily. 60 tablet 0    estradiol (ESTRING) 2 mg (7.5 mcg /24 hour) vaginal ring Place 2 mg vaginally once. 1 each 3     No current facility-administered medications on file prior to visit.        ALLERGIES: Review of patient's allergies indicates:  No Known Allergies     ROS:       Review of Systems   Constitutional: Negative for diaphoresis, fatigue, fever and unexpected weight change.   HENT:   Negative for lump/mass and sore throat.    Eyes: Negative for icterus.   Respiratory: Negative for cough and shortness of breath.    Cardiovascular: Negative for chest pain and palpitations.   Gastrointestinal: Negative for abdominal distention, constipation, diarrhea, nausea and vomiting.   Genitourinary: Negative for dysuria and frequency.    Musculoskeletal: Negative for arthralgias, gait problem and myalgias.        Pain and coolness in L hand on 2nd and 3rd digits   Skin: Negative for rash.   Neurological: Negative for dizziness, gait problem and headaches.   Hematological: Negative for adenopathy. Does not bruise/bleed easily.   Psychiatric/Behavioral: The patient is not nervous/anxious.        PHYSICAL EXAM:  Vitals:    12/26/17 1500   BP: 137/67   Pulse: 66   Temp: 97.6 °F (36.4 °C)   TempSrc: Oral   SpO2: 98%   Weight: 82.6 kg (182 lb 1.6 oz)   Height: 5' 4" (1.626 m)   PainSc: 0-No pain       Physical Exam   Constitutional: She is oriented to person, place, and time. She appears well-developed and well-nourished. " No distress.   HENT:   Head: Normocephalic and atraumatic.   Mouth/Throat: Mucous membranes are normal. No oral lesions.   Eyes: Conjunctivae are normal.   Neck: No thyromegaly present.   Cardiovascular: Normal rate, regular rhythm and normal heart sounds.    No murmur heard.  Pulmonary/Chest: Breath sounds normal. She has no wheezes. She has no rales.   Abdominal: Soft. She exhibits no distension and no mass. There is no splenomegaly or hepatomegaly. There is no tenderness.   Musculoskeletal:   The R 2nd digit is status post amputation with poor wound healing at the tip. The left 2nd and 3rd digits are cool to touch and more pale compared with the rest of the hand. There is no livedo reticularis of the extremities. The feet are normal.    Lymphadenopathy:     She has no cervical adenopathy.        Right cervical: No deep cervical adenopathy present.       Left cervical: No deep cervical adenopathy present.     She has no axillary adenopathy.        Right: No inguinal adenopathy present.        Left: No inguinal adenopathy present.   Neurological: She is alert and oriented to person, place, and time. She has normal strength and normal reflexes. No cranial nerve deficit. Coordination normal.   Skin: No rash noted.   The skin is partially denuded at the tip of the left 2nd digit and at the fingernail of the left 3rd digit.        LAB:   Results for orders placed or performed in visit on 12/26/17   Urinalysis   Result Value Ref Range    Specimen UA Urine, Clean Catch     Color, UA Yellow Yellow, Straw, Litzy    Appearance, UA Clear Clear    pH, UA 7.0 5.0 - 8.0    Specific Gravity, UA 1.010 1.005 - 1.030    Protein, UA Negative Negative    Glucose, UA Negative Negative    Ketones, UA Negative Negative    Bilirubin (UA) Negative Negative    Occult Blood UA Negative Negative    Nitrite, UA Negative Negative    Urobilinogen, UA Negative <2.0 EU/dL    Leukocytes, UA Negative Negative       PROBLEMS ASSESSED THIS  VISIT:    1. Arterial thromboembolism    2. Positive TOYA (antinuclear antibody)    3. Mixed hyperlipidemia    4. Benign hypertension        IMPRESSION:    1. Arterial thromboembolism    2. HTN  3. GERD  4. Atherosclerosis of the aorta    PLAN:       Ms. Shaffer has apparent recurrent arterial thromboembolic phenomena, initially presenting in the R 2nd digit and now with symptoms in the 2nd and 3rd digits of the L hand. They are cool to touch, suggestive of relative ischemia. She has normal radial pulses, and the remainder of her extremities are unaffected.    She has been referred here based on positive TOYA. The primary concern in arterial thromboembolism is to rule out antiphospholipid syndrome.    Ms. Shaffer does not meet criteria for antiphospholipid syndrome (APS). Her baseline PTT is normal, and testing for a lupus anticoagulant is negative. She does not demonstrate either beta-2 glycoprotein-1 antibodies or anticardiolipin antibodies. I therefore do not believe her symptoms are related to underlying APS.    The differential diagnosis includes arterial embolism, medium or small vessel vasculitis, atheroembolism. I have contacted the rheumatology department to help facilitate an earlier appointment.    I have also personally contacted Dr. Araya to help identify an appropriate imaging study to evaluate the degree of ischemia she may have in the L digits. I believe she is at high risk of loss of the newly affected digits.     With respect to therapy, aspirin is indicated. There is not a clear indication for anticoagulants at this time as this does not represent APS. Anticoagulants would be indicated if actual thrombosis is detected by imaging studies; thus, these should be sought as soon as possible.    Follow-up will be determined based on other evaluations. I will communicate with Dr. Araya to help coordinate her care appropriately.    Clay Reaves MD  Hematology and Stem Cell Transplant

## 2017-12-26 NOTE — TELEPHONE ENCOUNTER
Can you please call rheumatology to see if they can get her in sooner, she has had tiny blood clots to her fingers twice and there is concern about lupus.  Perhaps there is a cancellation list?

## 2017-12-26 NOTE — LETTER
December 28, 2017      Jayce Araya MD  [address]  [facility] [address]           [facility]  [address]  [facility] [address]  Phone: [phone]          Patient: Shanda Shaffer   MR Number: [id number]   YOB: 1955   Date of Visit: 12/26/2017       Dear Dr. Jayce Araya:    Thank you for referring Shanda Shaffer to me for evaluation. Attached you will find relevant portions of my assessment and plan of care.    If you have questions, please do not hesitate to call me. I look forward to following Shanda Shaffer along with you.    Sincerely,    Clay Reaves MD    Enclosure  CC:  No Recipients    If you would like to receive this communication electronically, please contact [email or web address] or [phone] to request more information on [facility] access.    For providers and/or their staff who would like to refer a patient to [facility], please contact us through our one-stop-shop provider referral line, [facility], at [phone].    If you feel you have received this communication in error or would no longer like to receive these types of communications, please e-mail [email or web address]

## 2017-12-27 ENCOUNTER — TELEPHONE (OUTPATIENT)
Dept: INTERNAL MEDICINE | Facility: CLINIC | Age: 62
End: 2017-12-27

## 2017-12-27 ENCOUNTER — OFFICE VISIT (OUTPATIENT)
Dept: PAIN MEDICINE | Facility: CLINIC | Age: 62
End: 2017-12-27
Payer: COMMERCIAL

## 2017-12-27 ENCOUNTER — PATIENT MESSAGE (OUTPATIENT)
Dept: HEMATOLOGY/ONCOLOGY | Facility: CLINIC | Age: 62
End: 2017-12-27

## 2017-12-27 VITALS
DIASTOLIC BLOOD PRESSURE: 80 MMHG | WEIGHT: 182.13 LBS | HEART RATE: 111 BPM | SYSTOLIC BLOOD PRESSURE: 148 MMHG | BODY MASS INDEX: 31.26 KG/M2

## 2017-12-27 DIAGNOSIS — G89.29 CHRONIC PAIN OF LEFT KNEE: ICD-10-CM

## 2017-12-27 DIAGNOSIS — M25.562 CHRONIC PAIN OF LEFT KNEE: ICD-10-CM

## 2017-12-27 DIAGNOSIS — M17.12 OSTEOARTHRITIS OF LEFT KNEE, UNSPECIFIED OSTEOARTHRITIS TYPE: Primary | ICD-10-CM

## 2017-12-27 LAB
ALBUMIN SERPL ELPH-MCNC: 4.27 G/DL
ALPHA1 GLOB SERPL ELPH-MCNC: 0.32 G/DL
ALPHA2 GLOB SERPL ELPH-MCNC: 0.67 G/DL
B-GLOBULIN SERPL ELPH-MCNC: 0.85 G/DL
CARDIOLIPIN IGG SER IA-ACNC: <9.4 GPL
CARDIOLIPIN IGM SER IA-ACNC: <9.4 MPL
GAMMA GLOB SERPL ELPH-MCNC: 0.9 G/DL
PATHOLOGIST INTERPRETATION SPE: NORMAL
PROT SERPL-MCNC: 7 G/DL

## 2017-12-27 PROCEDURE — 99999 PR PBB SHADOW E&M-EST. PATIENT-LVL III: CPT | Mod: PBBFAC,,, | Performed by: NURSE PRACTITIONER

## 2017-12-27 PROCEDURE — 99213 OFFICE O/P EST LOW 20 MIN: CPT | Mod: S$GLB,,, | Performed by: NURSE PRACTITIONER

## 2017-12-27 NOTE — TELEPHONE ENCOUNTER
Spoke to Rheumatology--currently there are no available appts sooner. Pt is already on the wait list for a sooner appt.

## 2017-12-27 NOTE — TELEPHONE ENCOUNTER
----- Message from Navi Enriquez sent at 12/27/2017  8:48 AM CST -----  Contact: Pt   Pt would like a call back stated that she emailed Dr. Peres to get a earlier appointment with Rheumatology before 02/2018 advised pt that's the earliest appt date and time also advised that she has been added to the wait list  Pt insist on Dr. Peres or her  nurse calling her to get something done ASAP       Pt can be reached at [phone]

## 2017-12-27 NOTE — PROGRESS NOTES
Chronic patient Established Note (Follow up visit)      SUBJECTIVE:    Shanda Shaffer presents to the clinic for a 2 wk follow-up appointment for left knee pain. She is S/P  LEFT GENICULAR NERVE COOLED RADIOFREQUENCY ABLATION RFA on 17 with 50-60% relief. Discussed that she will give the mobility more time, she currently is moving better since having procedure.  Since the last visit, Shanda Shaffer states the pain has been improving. Current pain intensity is 3/10.    Pain Disability Index Review:  Last 3 PDI Scores 2017   Pain Disability Index (PDI) 20 28 28       Pain Medications:     - Opioids: Tramadol  - Adjuvant Medications: Mobic (Meloxicam), Voltaren gel  - Anti-Coagulants: None  - Others: see medications list    Opioid Contract: no     report:  Reviewed and consistent with medication use as prescribed.    Pain Procedures: 17 LEFT GENICULAR NERVE COOLED RADIOFREQUENCY ABLATION RFA  17 left GENICULAR NERVE BLOCK  Bilateral Total knee replacement with Dr. Miller 2016    Physical Therapy/Home Exercise: no    Imagin17 X-ray Knee Ortho Bilateral    Narrative     4 views bilateral.    Right: There is a TKA in place alignment no complication.    Left: There is a TKA in place with good alignment and no complication.      Electronically signed by: ASHLEY MI MD  Date: 17  Time: 15:16     Encounter     View Encounter          Signed by     Signed Credentials Date/Time  Phone Pager   ASHLEY MI III, MD 2017 15:16 [phone]   Reviewed By     Reji Miller MD on 2017 16:49   Exam Details     Performed Procedure Technologist Supporting Staff Performing Physician   X-ray Knee Ortho Bilateral Jacquie River, RT Nataliya Banuelos       Appointment Date/Status Modality Department    2017     Completed [facility] XRORTHO2 485 LB LIMIT [facility] XRAY OR[facility]O       Begin Exam End Exam  End Exam Questionnaires   2017   2:26 PM 12/20/2017  2:47 PM  IMAGING END ALL      Imaging Repeats      Reason Repeat Count   Position 2   Order Report      Order Details     CT Lower Extremity Without Contrast Left 3/31/17  Narrative      Findings: There is a TKA in place good alignment no complications. There is normal rotation. No fracture dislocation bone destruction loosening or infection is seen. No joint space narrowing seen. No soft tissue masses are seen.   Impression       No significant abnormality seen.      Electronically signed by: ASHLEY MI  Date: 04/01/17  Time: 09:42              X-Ray Hip to Ankle 6/7/17  Narrative      Findings: There are bilateral TKAs in place.  There are mild genu valgus deformities.  No fracture dislocation bone destruction seen.      Electronically signed by: ASHLEY MI  Date: 06/07/17  Time: 09:01        Allergies: Review of patient's allergies indicates:  No Known Allergies    Current Medications:   Current Outpatient Prescriptions   Medication Sig Dispense Refill    aspirin (ECOTRIN) 81 MG EC tablet Take 2 tablets (162 mg total) by mouth once daily. 60 tablet 11    diclofenac sodium 1 % Gel APPLY 2 GRAMS TOPICALLY 4 TIMES DAILY 1 Tube 2    estradiol (ESTRACE) 0.01 % (0.1 mg/gram) vaginal cream Place 1 g vaginally once daily. 42.5 g 1    levoFLOXacin (LEVAQUIN) 750 MG tablet       losartan-hydrochlorothiazide 100-25 mg (HYZAAR) 100-25 mg per tablet Take 1 tablet by mouth once daily. 90 tablet 2    meloxicam (MOBIC) 15 MG tablet TAKE ONE TABLET BY MOUTH ONCE DAILY 30 tablet 0    metoprolol succinate (TOPROL-XL) 50 MG 24 hr tablet Take 1 tablet (50 mg total) by mouth once daily. 90 tablet 3    pantoprazole (PROTONIX) 40 MG tablet Take 40 mg by mouth once daily.      simvastatin (ZOCOR) 20 MG tablet Take 1 tablet (20 mg total) by mouth every evening. 90 tablet 3    trospium (SANCTURA) 20 mg Tab tablet Take 1 tablet (20 mg total) by mouth 2 (two) times daily. 60 tablet 0    tramadol (ULTRAM)  50 mg tablet Take 1 tablet (50 mg total) by mouth every 8 (eight) hours as needed for Pain. 60 tablet 0     No current facility-administered medications for this visit.        REVIEW OF SYSTEMS:    GENERAL:  No weight loss, malaise or fevers.  HEENT:  Negative for frequent or significant headaches.  NECK:  Negative for lumps, goiter, pain and significant neck swelling.  RESPIRATORY:  Negative for cough, wheezing or shortness of breath.  CARDIOVASCULAR:  Negative for chest pain, leg swelling or palpitations.  GI:  Negative for abdominal discomfort, blood in stools or black stools or change in bowel habits.  MUSCULOSKELETAL:  See HPI.  SKIN:  Negative for lesions, rash, and itching.  PSYCH:  Negative for sleep disturbance, mood disorder and recent psychosocial stressors.  HEMATOLOGY/LYMPHOLOGY:  Negative for prolonged bleeding, bruising easily or swollen nodes.  NEURO:   No history of headaches, syncope, paralysis, seizures or tremors.  All other reviewed and negative other than HPI.       Past Medical History:  Past Medical History:   Diagnosis Date    Acne vulgaris 2015    Benign hypertension 2015    Bladder prolapse, female, acquired 2015    Diverticulosis of large intestine without hemorrhage 2015    Esophageal stricture 2015    Former smoker: 1 pack a day for 30 years, quit 3/11/10 3/17/2017    Gastroesophageal reflux disease without esophagitis 2015    Hyperlipidemia     Non morbid obesity due to excess calories 2015    Primary osteoarthritis of both knees 2015       Past Surgical History:  Past Surgical History:   Procedure Laterality Date    bilateral knee surgery  2016    BREAST SURGERY      cyst removal     SECTION      x 4    HAND SURGERY      JOINT REPLACEMENT      KNEE SURGERY Bilateral 2016    TK    TONSILLECTOMY         Family History:  Family History   Problem Relation Age of Onset    Diabetes Mother     Heart disease  Mother     Arthritis Mother     Hypertension Father     Heart disease Father      PPM    Heart attack Father     Diabetes Sister     Heart disease Sister      valve replacement    Diabetes Brother     Arthritis Brother      RA    No Known Problems Son     Cancer Maternal Grandmother      breast    Stroke Maternal Grandmother     No Known Problems Son     No Known Problems Son     No Known Problems Son     Heart failure Neg Hx     Hyperlipidemia Neg Hx        Social History:  Social History     Social History    Marital status:      Spouse name: N/A    Number of children: 4    Years of education: N/A     Social History Main Topics    Smoking status: Former Smoker     Packs/day: 1.00     Years: 30.00     Types: Cigarettes     Quit date: 3/11/2010    Smokeless tobacco: Never Used    Alcohol use Yes      Comment: occasional    Drug use: No    Sexual activity: Not Currently     Other Topics Concern    None     Social History Narrative    None       OBJECTIVE:    BP (!) 148/80   Pulse (!) 111   Wt 82.6 kg (182 lb 1.6 oz)   BMI 31.26 kg/m²     PHYSICAL EXAMINATION:    General appearance: Well appearing, in no acute distress, alert and oriented x3.  Psych:  Mood and affect appropriate.  Skin: Scar of previous bilateral TKR Skin color, texture, turgor normal, no rashes or lesions, in both upper and lower body.  Head/face:  Normocephalic, atraumatic. No palpable lymph nodes.  Neck: No pain to palpation over the cervical paraspinous muscles. Spurling Negative. No pain with neck flexion, extension, or lateral flexion.   Cor: RRR  Pulm: CTA  Back: Straight leg raising in the sitting and supine positions is negative to radicular pain. No pain to palpation over the spine or costovertebral angles. Normal range of motion without pain reproduction.  Extremities:  Peripheral joint ROM is full and pain free without obvious instability or laxity in all four extremities. No deformities, edema, or skin  discoloration. Good capillary refill.  Musculoskeletal: Shoulder, hip, sacroiliac and knee provocative maneuvers are negative. Bilateral upper and lower extremity strength is normal and symmetric.  No atrophy or tone abnormalities are noted.  Neuro: Bilateral upper and lower extremity coordination and muscle stretch reflexes are physiologic and symmetric + except patellar reflex which is absent   Plantar response are downgoing. No loss of sensation is noted.  Gait: Normal       ASSESSMENT: 62 y.o. year old female with left knee pain, consistent with knee OA and chronic knee pain .  She had bilateral TKR by  in 2/2016. She has persistent left sided knee pian afterwards, but the right side is doing ok .     Diagnosis:     1. Osteoarthritis of left knee, unspecified osteoarthritis type     2. Chronic pain of left knee           PLAN:     - I have stressed the importance of physical activity and a home exercise plan to help with pain and improve health.  - Patient can continue with medications for now since they are providing benefits, using them appropriately, and without side effects.  - Counseled patient regarding the importance of activity modification, constant sleeping habits and physical therapy.  -RTC as needed for returning or new pain  -The above plan and management options were discussed at length with patient. Patient is in agreement with the above and verbalized understanding. Dr. Rose   was consulted on this patient  and agrees with this plan.     KARIME Gallego    12/27/2017

## 2017-12-27 NOTE — TELEPHONE ENCOUNTER
Please let her know that I have also placed an external referral for rheumatology if she wants to check with her insurance as to a rheumatologist in the community that is available sooner than our institution.  Order is on your desk.  Thank you

## 2017-12-28 LAB
APTT HEX PL PPP: NEGATIVE S
B2 GLYCOPROT1 IGA SER QL: <9 SAU
B2 GLYCOPROT1 IGG SER QL: <9 SGU
B2 GLYCOPROT1 IGM SER QL: 18 SMU

## 2018-01-02 DIAGNOSIS — I74.9 ARTERIAL THROMBOEMBOLISM: Primary | ICD-10-CM

## 2018-01-03 ENCOUNTER — LAB VISIT (OUTPATIENT)
Dept: LAB | Facility: HOSPITAL | Age: 63
End: 2018-01-03
Payer: COMMERCIAL

## 2018-01-03 ENCOUNTER — PATIENT MESSAGE (OUTPATIENT)
Dept: VASCULAR SURGERY | Facility: CLINIC | Age: 63
End: 2018-01-03

## 2018-01-03 ENCOUNTER — INITIAL CONSULT (OUTPATIENT)
Dept: RHEUMATOLOGY | Facility: CLINIC | Age: 63
End: 2018-01-03
Payer: COMMERCIAL

## 2018-01-03 VITALS
SYSTOLIC BLOOD PRESSURE: 134 MMHG | HEART RATE: 61 BPM | HEIGHT: 64 IN | WEIGHT: 180 LBS | BODY MASS INDEX: 30.73 KG/M2 | DIASTOLIC BLOOD PRESSURE: 77 MMHG

## 2018-01-03 DIAGNOSIS — I75.011 ATHEROEMBOLISM OF RIGHT UPPER EXTREMITY: ICD-10-CM

## 2018-01-03 DIAGNOSIS — R76.8 ANA POSITIVE: ICD-10-CM

## 2018-01-03 DIAGNOSIS — I10 BENIGN HYPERTENSION: ICD-10-CM

## 2018-01-03 DIAGNOSIS — R76.8 ANA POSITIVE: Primary | ICD-10-CM

## 2018-01-03 DIAGNOSIS — R09.89 ALTERED TISSUE PERFUSION: ICD-10-CM

## 2018-01-03 LAB
AMPHET+METHAMPHET UR QL: NEGATIVE
BARBITURATES UR QL SCN>200 NG/ML: NEGATIVE
BENZODIAZ UR QL SCN>200 NG/ML: NEGATIVE
BILIRUB UR QL STRIP: NEGATIVE
BZE UR QL SCN: NEGATIVE
CANNABINOIDS UR QL SCN: NEGATIVE
CLARITY UR REFRACT.AUTO: CLEAR
COLOR UR AUTO: NORMAL
CREAT UR-MCNC: 35 MG/DL
CREAT UR-MCNC: 35 MG/DL
GLUCOSE UR QL STRIP: NEGATIVE
HGB UR QL STRIP: NEGATIVE
KETONES UR QL STRIP: NEGATIVE
LEUKOCYTE ESTERASE UR QL STRIP: NEGATIVE
METHADONE UR QL SCN>300 NG/ML: NEGATIVE
NITRITE UR QL STRIP: NEGATIVE
OPIATES UR QL SCN: NEGATIVE
PCP UR QL SCN>25 NG/ML: NEGATIVE
PH UR STRIP: 7 [PH] (ref 5–8)
PROT UR QL STRIP: NEGATIVE
PROT UR-MCNC: <7 MG/DL
PROT/CREAT RATIO, UR: NORMAL
SP GR UR STRIP: 1.01 (ref 1–1.03)
TOXICOLOGY INFORMATION: NORMAL
URN SPEC COLLECT METH UR: NORMAL
UROBILINOGEN UR STRIP-ACNC: NEGATIVE EU/DL

## 2018-01-03 PROCEDURE — 81003 URINALYSIS AUTO W/O SCOPE: CPT

## 2018-01-03 PROCEDURE — 99204 OFFICE O/P NEW MOD 45 MIN: CPT | Mod: S$GLB,,, | Performed by: INTERNAL MEDICINE

## 2018-01-03 PROCEDURE — 80307 DRUG TEST PRSMV CHEM ANLYZR: CPT

## 2018-01-03 PROCEDURE — 99999 PR PBB SHADOW E&M-EST. PATIENT-LVL III: CPT | Mod: PBBFAC,,, | Performed by: INTERNAL MEDICINE

## 2018-01-03 PROCEDURE — 84156 ASSAY OF PROTEIN URINE: CPT

## 2018-01-03 RX ORDER — AMLODIPINE BESYLATE 10 MG/1
10 TABLET ORAL DAILY
Qty: 30 TABLET | Refills: 3 | Status: SHIPPED | OUTPATIENT
Start: 2018-01-03 | End: 2018-05-03 | Stop reason: SDUPTHER

## 2018-01-03 NOTE — PROGRESS NOTES
"Subjective:       Patient ID: Shanda Shaffer is a 62 y.o. female.    Chief Complaint: Disease Management    HPI 62YF with hx HTN, HLD, GERD ( EGD showing ulcers per pt on PPI by Dr Olivo @ [facility]), OA of b/l knees s/p joint replacement b/l referred by Dr Araya for TOYA+ve in the setting of atheroembolism. Pt reports R 2nd digit which started off as a " hang nail" and became gangrenous seen by Vascular surgery ? Cholesterol embolism. Amputation on Oct 4th 2017. Workup done TOYA 1: 1280 with neg profile, CTA chest with mild atherosclerosis of the aortic arch, no dissection or aneurysms, no LAD and nodular opacity 0.8cm in KSENIA pleura. 2D ECHO done  showed no evidence of vegetations or thrombi. EF 60-65%.  Pt reported around Dec 15th she developed "silver on 2nd digit and hang nail on 3rd digit L hand" now cold on the tips, discolored and painful to touch  Pt is currently on ASA. Seen by Hand surgeon Dr Thorne and started on Levaquin for changes on L hand. Seen by Hematology Dr Reaves who has ordered CTA of L upper extremity for further evaluation of ischemic digits    + weight loss 10lb after amputation of the R digit which she attributed to pain meds causing loss of appetite. Pt reports an episode of finger turning white on her 4th digit on R hand. 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 AM stiffness for about 15 mins.     Pt denies fatigue, oral/nasal/genital ulcers, headaches, fever, chills, n/v, abd pain, CP, SOB, dysphagia, hemoptysis, recent travel, changes in bowel/bladder habits, pleurisy, pericarditis, vision changes,tight skin, thromoboses, photosensitivity, skin rash, raynauds, alopecia, joint swelling or erythema.    family history of autoimmune disease : Niece SLE ,Brother RA    Works as  in [employer]Sexual active with  of 30yrs, no other contacts. hx of miscarriages X1 in the 1st trimester  1 . Had 4 children " with no complications.    Used to smoke 1pk/dayX20 yrs quit in . Used to use THC and snort cocaine ~ 20yrs ago not anymore.       Past Medical History:   Diagnosis Date    Acne vulgaris 2015    Benign hypertension 2015    Bladder prolapse, female, acquired 2015    Diverticulosis of large intestine without hemorrhage 2015    Esophageal stricture 2015    Former smoker: 1 pack a day for 30 years, quit 3/11/10 3/17/2017    Gastroesophageal reflux disease without esophagitis 2015    Hyperlipidemia     Non morbid obesity due to excess calories 2015    Primary osteoarthritis of both knees 2015     Past Surgical History:   Procedure Laterality Date    bilateral knee surgery  2016    BREAST SURGERY      cyst removal     SECTION      x 4    HAND SURGERY      JOINT REPLACEMENT      KNEE SURGERY Bilateral 2016    TK    TONSILLECTOMY       Family History   Problem Relation Age of Onset    Diabetes Mother     Heart disease Mother     Arthritis Mother     Hypertension Father     Heart disease Father      PPM    Heart attack Father     Diabetes Sister     Heart disease Sister      valve replacement    Diabetes Brother     Arthritis Brother      RA    No Known Problems Son     Cancer Maternal Grandmother      breast    Stroke Maternal Grandmother     No Known Problems Son     No Known Problems Son     No Known Problems Son     Heart failure Neg Hx     Hyperlipidemia Neg Hx          Current Outpatient Prescriptions on File Prior to Visit   Medication Sig Dispense Refill    aspirin (ECOTRIN) 81 MG EC tablet Take 2 tablets (162 mg total) by mouth once daily. 60 tablet 11    meloxicam (MOBIC) 15 MG tablet TAKE ONE TABLET BY MOUTH ONCE DAILY 30 tablet 0    trospium (SANCTURA) 20 mg Tab tablet Take 1 tablet (20 mg total) by mouth 2 (two) times daily. 60 tablet 0    [DISCONTINUED] diclofenac sodium 1 % Gel APPLY 2 GRAMS TOPICALLY 4  "TIMES DAILY 1 Tube 2    [DISCONTINUED] estradiol (ESTRACE) 0.01 % (0.1 mg/gram) vaginal cream Place 1 g vaginally once daily. 42.5 g 1    [DISCONTINUED] losartan-hydrochlorothiazide 100-25 mg (HYZAAR) 100-25 mg per tablet Take 1 tablet by mouth once daily. 90 tablet 2    [DISCONTINUED] metoprolol succinate (TOPROL-XL) 50 MG 24 hr tablet Take 1 tablet (50 mg total) by mouth once daily. 90 tablet 3    [DISCONTINUED] pantoprazole (PROTONIX) 40 MG tablet Take 40 mg by mouth once daily.      [DISCONTINUED] simvastatin (ZOCOR) 20 MG tablet Take 1 tablet (20 mg total) by mouth every evening. 90 tablet 3    [DISCONTINUED] levoFLOXacin (LEVAQUIN) 750 MG tablet       [DISCONTINUED] tramadol (ULTRAM) 50 mg tablet Take 1 tablet (50 mg total) by mouth every 8 (eight) hours as needed for Pain. 60 tablet 0     No current facility-administered medications on file prior to visit.      Review of patient's allergies indicates:  No Known Allergies    Review of Systems   Constitutional: Negative for chills and fever.   HENT: Negative for congestion, mouth sores and trouble swallowing.    Eyes: Negative for visual disturbance.   Respiratory: Negative for cough and chest tightness.    Cardiovascular: Negative for chest pain, palpitations and leg swelling.   Gastrointestinal: Negative for abdominal pain, constipation, diarrhea, nausea and vomiting.   Endocrine: Negative for polyuria.   Genitourinary: Negative for difficulty urinating, frequency, hematuria and urgency.   Musculoskeletal: Negative for arthralgias and joint swelling.   Skin: Positive for color change. Negative for rash.   Neurological: Negative for dizziness, tremors and numbness.   Hematological: Does not bruise/bleed easily.   Psychiatric/Behavioral: Negative for decreased concentration. The patient is not nervous/anxious.          Objective:   /77   Pulse 61   Ht 5' 4" (1.626 m)   Wt 81.6 kg (180 lb)   BMI 30.90 kg/m²      Physical Exam   Constitutional: " She is oriented to person, place, and time and well-developed, well-nourished, and in no distress.   HENT:   Head: Normocephalic and atraumatic.   Eyes: EOM are normal. Pupils are equal, round, and reactive to light.   Neck: Normal range of motion. Neck supple.   No carotid or subclavian bruits     Cardiovascular: Normal rate and regular rhythm.    Pulmonary/Chest: Effort normal and breath sounds normal.   Abdominal: Soft. Bowel sounds are normal. She exhibits no distension. There is no tenderness.       Right Side Rheumatological Exam     Examination finds the shoulder, elbow, wrist, knee, 1st PIP, 1st MCP, 2nd PIP, 2nd MCP, 3rd PIP, 3rd MCP, 4th PIP, 4th MCP, 5th PIP and 5th MCP normal.    Left Side Rheumatological Exam     Examination finds the shoulder, elbow, wrist, knee, 1st PIP, 1st MCP, 2nd PIP, 2nd MCP, 3rd PIP, 3rd MCP, 4th PIP, 4th MCP, 5th PIP and 5th MCP normal.      Lymphadenopathy:     She has no cervical adenopathy.   Neurological: She is alert and oriented to person, place, and time.   Skin: Skin is warm and dry. No rash noted. No erythema. There is pallor.     No nail pitting  ulceration around nail bed on 3rd digit L hand  Bluish discoloration to the 2nd and 3rd digit on L and tender to palpation   Psychiatric: Affect normal.   Musculoskeletal: Normal range of motion. She exhibits tenderness. She exhibits no edema or deformity.   2+ radial pulses b/l  Posterior tibial pulses 1+ on RLE  DP 2+ on LLE         Results for GLADYS FOLEY (MRN [id number]) as of 1/3/2018 17:12   Ref. Range 12/26/2017 16:14   WBC Latest Ref Range: 3.90 - 12.70 K/uL 8.38   RBC Latest Ref Range: 4.00 - 5.40 M/uL 4.19   Hemoglobin Latest Ref Range: 12.0 - 16.0 g/dL 13.3   Hematocrit Latest Ref Range: 37.0 - 48.5 % 39.5   MCV Latest Ref Range: 82 - 98 fL 94   MCH Latest Ref Range: 27.0 - 31.0 pg 31.7 (H)   MCHC Latest Ref Range: 32.0 - 36.0 g/dL 33.7   RDW Latest Ref Range: 11.5 - 14.5 % 13.0   Platelets Latest Ref Range:  150 - 350 K/uL 336   MPV Latest Ref Range: 9.2 - 12.9 fL 9.3   Gran% Latest Ref Range: 38.0 - 73.0 % 65.1   Gran # Latest Ref Range: 1.8 - 7.7 K/uL 5.5   Immature Granulocytes Latest Ref Range: 0.0 - 0.5 % 0.2   Immature Grans (Abs) Latest Ref Range: 0.00 - 0.04 K/uL 0.02   Lymph% Latest Ref Range: 18.0 - 48.0 % 24.9   Lymph # Latest Ref Range: 1.0 - 4.8 K/uL 2.1   Mono% Latest Ref Range: 4.0 - 15.0 % 7.0   Mono # Latest Ref Range: 0.3 - 1.0 K/uL 0.6   Eosinophil% Latest Ref Range: 0.0 - 8.0 % 2.3   Eos # Latest Ref Range: 0.0 - 0.5 K/uL 0.2   Basophil% Latest Ref Range: 0.0 - 1.9 % 0.5   Baso # Latest Ref Range: 0.00 - 0.20 K/uL 0.04   nRBC Latest Ref Range: 0 /100 WBC 0     Results for GLADYS FOLEY (MRN [id number]) as of 1/3/2018 17:12   Ref. Range 12/26/2017 16:14   Sodium Latest Ref Range: 136 - 145 mmol/L 138   Potassium Latest Ref Range: 3.5 - 5.1 mmol/L 3.8   Chloride Latest Ref Range: 95 - 110 mmol/L 102   CO2 Latest Ref Range: 23 - 29 mmol/L 28   Anion Gap Latest Ref Range: 8 - 16 mmol/L 8   BUN, Bld Latest Ref Range: 8 - 23 mg/dL 21   Creatinine Latest Ref Range: 0.5 - 1.4 mg/dL 0.9   eGFR if non African American Latest Ref Range: >60 mL/min/1.73 m^2 >60.0   eGFR if African American Latest Ref Range: >60 mL/min/1.73 m^2 >60.0   Glucose Latest Ref Range: 70 - 110 mg/dL 96   Calcium Latest Ref Range: 8.7 - 10.5 mg/dL 9.6   Alkaline Phosphatase Latest Ref Range: 55 - 135 U/L 83   Total Protein Latest Ref Range: 6.0 - 8.4 g/dL 7.3   Protein, Serum Latest Ref Range: 6.0 - 8.4 g/dL 7.0   Albumin Latest Ref Range: 3.5 - 5.2 g/dL 3.8   Total Bilirubin Latest Ref Range: 0.1 - 1.0 mg/dL 0.6   AST Latest Ref Range: 10 - 40 U/L 21   ALT Latest Ref Range: 10 - 44 U/L 25   CRP Latest Ref Range: 0.0 - 8.2 mg/L 0.9   Results for GLADYS FOLEY (MRN [id number]) as of 1/3/2018 17:12   Ref. Range 12/20/2017 17:06   TOYA HEP-2 Titer Unknown Positive 1:1280 C...   Anti-SSA Antibody Latest Ref Range: 0.00 - 19.99 EU 1.06    Anti-SSA Interpretation Latest Ref Range: Negative  Negative   Anti-SSB Antibody Latest Ref Range: 0.00 - 19.99 EU 0.00   Anti-SSB Interpretation Latest Ref Range: Negative  Negative   ds DNA Ab Latest Ref Range: Negative 1:10  Negative 1:10   Anti Sm Antibody Latest Ref Range: 0.00 - 19.99 EU 0.58   Anti-Sm Interpretation Latest Ref Range: Negative  Negative   Anti Sm/RNP Antibody Latest Ref Range: 0.00 - 19.99 EU 0.72   Anti-Sm/RNP Interpretation Latest Ref Range: Negative  Negative   Results for GLADYS FOLEY (MRN [id number]) as of 1/3/2018 17:12   Ref. Range 12/20/2017 17:06 1/3/2018 13:38   CCP Antibodies Latest Ref Range: <5.0 U/mL  <0.5   Rheumatoid Factor Latest Ref Range: 0.0 - 15.0 IU/mL 12.0    Results for GLADYS FOLEY (MRN [id number]) as of 1/3/2018 17:12   Ref. Range 12/26/2017 16:14   Complement (C-3) Latest Ref Range: 50 - 180 mg/dL 127   Complement (C-4) Latest Ref Range: 11 - 44 mg/dL 20   Results for GLADYS FOLEY (MRN [id number]) as of 1/3/2018 17:12   Ref. Range 12/26/2017 16:00   Specimen UA Unknown Urine, Clean Catch   Color, UA Latest Ref Range: Yellow, Straw, Litzy  Yellow   pH, UA Latest Ref Range: 5.0 - 8.0  7.0   Specific Gravity, UA Latest Ref Range: 1.005 - 1.030  1.010   Appearance, UA Latest Ref Range: Clear  Clear   Protein, UA Latest Ref Range: Negative  Negative   Glucose, UA Latest Ref Range: Negative  Negative   Ketones, UA Latest Ref Range: Negative  Negative   Occult Blood UA Latest Ref Range: Negative  Negative   Nitrite, UA Latest Ref Range: Negative  Negative   Urobilinogen, UA Latest Ref Range: <2.0 EU/dL Negative   Bilirubin (UA) Latest Ref Range: Negative  Negative   Leukocytes, UA Latest Ref Range: Negative  Negative     SPEP  Normal total protein, normal pattern.                Previous R 2nd digit prior to amputation      CTA chest 08/17  Examination of the soft tissue and vascular structures at the base of the neck is unremarkable.    There is a left-sided  aortic arch with 3 branch vessels.  The thoracic aorta maintains normal caliber, contour, and course with mild atherosclerotic calcification.  There is no evidence of aneurysmal dilation or dissection.    The pulmonary arteries distribute normally without evidence of filling defect to indicate pulmonary thromboembolism.      The heart is not enlarged.  No pericardial effusion.    There is no axillary, mediastinal, or hilar lymph node enlargement.      The esophagus maintains a normal course and caliber.  There is a small hiatal hernia.    Limited images of the upper abdomen obtained during the course of this dedicated thoracic CT demonstrate nothing unusual.  Incidental note is made of splenules.    The osseous structures demonstrate age-related degenerative change.    The trachea and proximal airways are patent.    The lungs are symmetrically expanded with limited pulmonary parenchymal evaluation secondary to motion artifact on this study with technique optimized for pulmonary arterial evaluation.  Bibasilar dependent atelectasis. No evidence of consolidation, pneumothorax, mass, or significant pleural thickening.  There is a 0.8 cm nodular opacity abutting the pleura in the apicoposterior segment of the LEFT upper lobe with central calcification likely representing granulomatous disease.  Additionally, there are few calcified pulmonary micronodules bilaterally.  There is no evidence of pleural fluid.    Xray Knee 12/20/17  Right: There is a TKA in place alignment no complication  Left: There is a TKA in place with good alignment and no complication    2D ECHO 09/17  CONCLUSIONS     1 - Normal left ventricular systolic function (EF 60-65%).     2 - Normal right ventricular systolic function .     3 - Normal left ventricular diastolic function.     4 - Mild tricuspid regurgitation.     5 - The estimated PA systolic pressure is 29 mmHg.     6 - No evidence of intracardiac shunt  Assessment:       1. TOYA positive     2. Atheroembolism of right upper extremity    3. Altered tissue perfusion    4. Benign hypertension            Plan:         Shanda was seen today for disease management.    Diagnoses and all orders for this visit:    TOYA positive  TOYA 1:1280 centromere with negative profile. Normal complements. APS workup negative. CBC shows no cytopenias or eosinophilia. metabolic profile shows normal liver and renal function. ESR 22, CRP 0.9. SPEP wnl. Hypercoagulable workup negative. Pt does not meet SLICC criteria for SLE based on symptoms and labs.  No evidence of synovitis on exam, RF neg. Low suspicion for inflammatory arthritis.  Will complete workup.   CTA chest showing mild atherosclerosis,nodular opacity 0.8cm pleura KSENIA with  no evidence of aortic aneurysmal dilation or dissection, no LAD. 2D ECHO shows EF 60-65%, no thrombi/vegetations, no effusions or shunts. Previous  HIV neg.  Will workup for autoimmune cause although no evidence to suggest this at this time as cause of pt's presentation. Pt reports genital ulcers/oral ulcers ? behcets in differential although ulcers do not seem to be recurrent.   Cryo-pending.   -     Cyclic citrul peptide antibody, IgG; Future  -     Direct antiglobulin test; Future  -     Urinalysis; Future  -     Cancel: Protein / creatinine ratio, urine  -     Cancel: Drug screen panel, emergency  -     HIV 1 / 2 ANTIBODY; Future  -     HEPATITIS C ANTIBODY; Future  -     Hepatitis B core antibody, total; Future  -     Hepatitis B surface antigen; Future  -     HEPATITIS B SURFACE ANTIBODY; Future  -     Complement, total; Future  -     Quantiferon Gold TB; Future  -     ANTI-NEUTROPHILIC CYTOPLASMIC ANTIBODY; Future  -     Myeloperoxidase Antibody (MPO); Future  -     Proteinase 3 Autoantibodies; Future  -     HLA Class I Disease Association; Future  -     RPR; Future  -     VAS US Arterial Arm Left; Future  -     Protein / creatinine ratio, urine; Future  -     Drug screen panel, emergency;  Future    Atheroembolism of right upper extremity  -     Ambulatory referral to Vascular Surgery    Altered tissue perfusion  Infectious vs autoimmune/vasculitis vs atherosclerosis vs embolic  No BP asymmetry, minimally elevated inflammatory marker ESR 22 and normal CRP, normal albumin makes chronic inflammatory process less likely.  No indication for steroids at this time,will complete workup   See above #TOYA  Obtain US Vascular of L arm for further assessment.  F/u CTA of L upper arm.   Refer to vascular surgery    Benign hypertension  BP stable. Will switch Metoprolol to amlodipine which should help with tissue perfusion    Other orders  -     amLODIPine (NORVASC) 10 MG tablet; Take 1 tablet (10 mg total) by mouth once daily.    RTC in 6 weeks

## 2018-01-04 ENCOUNTER — TELEPHONE (OUTPATIENT)
Dept: HEMATOLOGY/ONCOLOGY | Facility: CLINIC | Age: 63
End: 2018-01-04

## 2018-01-04 LAB — CRYOGLOB SER QL: NORMAL

## 2018-01-04 NOTE — PATIENT INSTRUCTIONS
Labs today    Switch Metoprolol to amlodipine and monitor blood pressure    Continue Asprin    Follow up with vascular surgery

## 2018-01-04 NOTE — PROGRESS NOTES
I have personally taken the history and examined the patient to verify the fellow's notation, and I agree with the impression and plan stated.   She has hx of R index digital ischemia resulting in gangrene and amputation , and now has L hand lesions.   She is not a smoker but has hx ASCVD.  TOYA is positive but other studies so far are negative for CTD.  We will complete the vasculitis workup but in meantime will get additional vascular ultrasound (and she is scheduled for CTA) and she will contact Dr. Araya's office to follow up with him as well.  Martín Reaves MD  Rheumatology  Mobile: [phone]

## 2018-01-04 NOTE — TELEPHONE ENCOUNTER
Spoke to patient and rescheduled CT scan for 1/10 at 6pm.    Patient verbalized understanding.        ----- Message from Yuliya Maxwell sent at 1/4/2018  3:24 PM CST -----  Contact: Pt can be reached at [phone]  Pt is calling to speak with the nurse concerning her appt would like to come in earlier at 2:00 or 2:30 p.m. If possible the same date.    Please contact pt      Thank you!

## 2018-01-08 ENCOUNTER — HOSPITAL ENCOUNTER (OUTPATIENT)
Dept: VASCULAR SURGERY | Facility: CLINIC | Age: 63
Discharge: HOME | End: 2018-01-08
Attending: INTERNAL MEDICINE
Payer: COMMERCIAL

## 2018-01-08 DIAGNOSIS — R76.8 ANA POSITIVE: ICD-10-CM

## 2018-01-08 PROCEDURE — 93931 UPPER EXTREMITY STUDY: CPT | Mod: S$GLB,,, | Performed by: SURGERY

## 2018-01-09 ENCOUNTER — TELEPHONE (OUTPATIENT)
Dept: INTERNAL MEDICINE | Facility: CLINIC | Age: 63
End: 2018-01-09

## 2018-01-09 DIAGNOSIS — E78.2 MIXED HYPERLIPIDEMIA: Primary | ICD-10-CM

## 2018-01-09 NOTE — TELEPHONE ENCOUNTER
I have called the patient to discuss with her getting on statin therapy and to schedule a physical exam.  Unable to reach, message left.

## 2018-01-09 NOTE — TELEPHONE ENCOUNTER
----- Message from Martín Reaves MD sent at 1/9/2018  8:01 AM CST -----  Thank you, Manuela. I am not sure why she has these embolic looking lesions.  WD  ----- Message -----  From: Manuela Peres MD  Sent: 1/8/2018  11:47 AM  To: Jayce Araya MD, Martín Reaves MD    She should probably be on a statin- I also will need to see her for a physical, will have my staff contact her    LB  ----- Message -----  From: Jayce Araya MD  Sent: 1/8/2018  10:02 AM  To: Manuela Peres MD, Martín Reaves MD, #    Vale Resendiz  Thanks for seeing her and doing the rheum w/u.    The CTA chest was normal in Dec 2017 (CTA chest:  no arch lesions; no innominate, L subclavian or axillary artery lesion) and she had palpable radial/ulnar pulses.    Besides treating her empirically with ASA and high-dose statin rx for possible atheroembolism, I don't think unfortunately there will be more I will be able to add.      We can await a new L arm u/s and CTA arm that was order --- think these will be low-yield, however.    Manuela: any other thoughts?    Ashish Eduardo    ----- Message -----  From: Angélica Lacey MD  Sent: 1/3/2018   6:39 PM  To: Jayce Araya MD, Manuela Peres MD

## 2018-01-10 ENCOUNTER — HOSPITAL ENCOUNTER (OUTPATIENT)
Dept: RADIOLOGY | Facility: HOSPITAL | Age: 63
Discharge: HOME | End: 2018-01-10
Attending: INTERNAL MEDICINE
Payer: COMMERCIAL

## 2018-01-10 DIAGNOSIS — I74.9 ARTERIAL THROMBOEMBOLISM: ICD-10-CM

## 2018-01-10 PROBLEM — M79.644 FINGER PAIN, RIGHT: Status: RESOLVED | Noted: 2017-08-13 | Resolved: 2018-01-10

## 2018-01-10 PROCEDURE — 73206 CT ANGIO UPR EXTRM W/O&W/DYE: CPT | Mod: TC,LT

## 2018-01-10 PROCEDURE — 73206 CT ANGIO UPR EXTRM W/O&W/DYE: CPT | Mod: 26,LT,, | Performed by: RADIOLOGY

## 2018-01-10 PROCEDURE — 25500020 PHARM REV CODE 255: Performed by: INTERNAL MEDICINE

## 2018-01-10 RX ORDER — SIMVASTATIN 20 MG/1
20 TABLET, FILM COATED ORAL DAILY
Qty: 90 TABLET | Refills: 3 | Status: SHIPPED | OUTPATIENT
Start: 2018-01-10 | End: 2018-01-15 | Stop reason: CLARIF

## 2018-01-10 RX ADMIN — IOHEXOL 100 ML: 350 INJECTION, SOLUTION INTRAVENOUS at 07:01

## 2018-01-10 NOTE — TELEPHONE ENCOUNTER
----- Message from Lily Jeffries sent at 1/10/2018  8:51 AM CST -----  Contact: self/[phone]      Patient is returning a phone call.  Who left a message for the patient: Dr Peres  Does patient know what this is regarding:  Returning call  Comments: thank you!!!

## 2018-01-10 NOTE — TELEPHONE ENCOUNTER
She is on Zocor already, has been taking since last fall- for some reason fell off her med list.    Ulceration on finger, will see hand surgery tomorrow.  She actually has established with another PCP next week who is in her area    She needs labs today- this pm- orders in (lipids) thanks- please call her to give her the time

## 2018-01-11 ENCOUNTER — PATIENT MESSAGE (OUTPATIENT)
Dept: INTERNAL MEDICINE | Facility: CLINIC | Age: 63
End: 2018-01-11

## 2018-01-11 ENCOUNTER — OFFICE VISIT (OUTPATIENT)
Dept: VASCULAR SURGERY | Facility: CLINIC | Age: 63
End: 2018-01-11
Payer: COMMERCIAL

## 2018-01-11 VITALS
SYSTOLIC BLOOD PRESSURE: 141 MMHG | HEIGHT: 64 IN | WEIGHT: 183 LBS | HEART RATE: 81 BPM | BODY MASS INDEX: 31.24 KG/M2 | DIASTOLIC BLOOD PRESSURE: 75 MMHG | TEMPERATURE: 98 F

## 2018-01-11 DIAGNOSIS — E78.2 MIXED HYPERLIPIDEMIA: Primary | ICD-10-CM

## 2018-01-11 PROCEDURE — 99214 OFFICE O/P EST MOD 30 MIN: CPT | Mod: S$GLB,,, | Performed by: SURGERY

## 2018-01-11 NOTE — PROGRESS NOTES
Shanda Shaffer  2018  Referred by: Dr. Cheema at Urgent Care  HPI:   Patient is a 62 y.o. female with HTN and HLD who is here today for f/u of right 2nd finger discoloration - began in Aug 2017 --- underwent R 2nd digit tip amputation.  In Aug 2017, she went to urgent care yesterday and was told to take ASA and take pain meds as needed and come to vascular appointment today.  She is not taking a statin.  She denies history of autoimmune problems.  She had bilateral TKA 18 months ago.    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 [employer]    PMD is Dr Cintia Villalta ()  PMD is Dr JULIUS Peres    Past Medical History:   Diagnosis Date    Acne vulgaris 2015    Benign hypertension 2015    Bladder prolapse, female, acquired 2015    Diverticulosis of large intestine without hemorrhage 2015    Esophageal stricture 2015    Former smoker: 1 pack a day for 30 years, quit 3/11/10 3/17/2017    Gastroesophageal reflux disease without esophagitis 2015    Hyperlipidemia     Non morbid obesity due to excess calories 2015    Primary osteoarthritis of both knees 2015     Past Surgical History:   Procedure Laterality Date    bilateral knee surgery  2016    BREAST SURGERY      cyst removal     SECTION      x 4    HAND SURGERY      JOINT REPLACEMENT      KNEE SURGERY Bilateral 2016    TK    TONSILLECTOMY       Family History   Problem Relation Age of Onset    Diabetes Mother     Heart disease Mother     Arthritis Mother     Hypertension Father     Heart disease Father      PPM    Heart attack Father     Diabetes Sister     Heart disease Sister      valve replacement    Diabetes Brother     Arthritis Brother      RA    No  Known Problems Son     Cancer Maternal Grandmother      breast    Stroke Maternal Grandmother     No Known Problems Son     No Known Problems Son     No Known Problems Son     Heart failure Neg Hx     Hyperlipidemia Neg Hx      Social History     Social History    Marital status:      Spouse name: N/A    Number of children: 4    Years of education: N/A     Occupational History    Not on file.     Social History Main Topics    Smoking status: Former Smoker     Packs/day: 1.00     Years: 30.00     Types: Cigarettes     Quit date: 3/11/2010    Smokeless tobacco: Never Used    Alcohol use Yes      Comment: occasional    Drug use: No    Sexual activity: Not Currently     Other Topics Concern    Not on file     Social History Narrative    No narrative on file     Current Outpatient Prescriptions on File Prior to Visit   Medication Sig    amLODIPine (NORVASC) 10 MG tablet Take 1 tablet (10 mg total) by mouth once daily.    aspirin (ECOTRIN) 81 MG EC tablet Take 2 tablets (162 mg total) by mouth once daily.    meloxicam (MOBIC) 15 MG tablet TAKE ONE TABLET BY MOUTH ONCE DAILY    simvastatin (ZOCOR) 20 MG tablet Take 1 tablet (20 mg total) by mouth once daily.    trospium (SANCTURA) 20 mg Tab tablet Take 1 tablet (20 mg total) by mouth 2 (two) times daily.     Current Facility-Administered Medications on File Prior to Visit   Medication    [COMPLETED] omnipaque 350 iohexol 100 mL       REVIEW OF SYSTEMS:  General: negative; ENT: negative; Allergy and Immunology: negative; Hematological and Lymphatic: Negative; Endocrine: negative; Respiratory: no cough, shortness of breath, or wheezing; Cardiovascular: no chest pain or dyspnea on exertion; Gastrointestinal: no abdominal pain/back, change in bowel habits, or bloody stools; Genito-Urinary: no dysuria, trouble voiding, or hematuria; Musculoskeletal: negative  Neurological: no TIA or stroke symptoms    PHYSICAL EXAM:                General  appearance:  Alert, well-appearing, and in no distress.  Oriented to person, place, and time   Neurological: Normal speech, no focal findings noted; CN II - XII grossly intact           Musculoskeletal: Digits/nail without cyanosis/clubbing.  Normal muscle strength/tone.                 Neck: Supple, no significant adenopathy; thyroid is not enlarged                  No carotid bruit can be auscultated                Chest:  Clear to auscultation, no wheezes, rales or rhonchi, symmetric air entry     No use of accessory muscles             Cardiac: Normal rate and regular rhythm, S1 and S2 normal; PMI non-displaced          Abdomen: Soft, nontender, nondistended, no masses or organomegaly     No rebound tenderness noted; bowel sounds normal     No Palpable Pulsatile aortic mass.      No groin adenopathy      Extremities:  2+ radial, ulnar pulses bilaterally     Right 2nd digit - healing distal phalanx amputation     L 2nd digit - distal phalanx w cyanosis       2+ femoral pulses bilaterally      2+ pedal pulses palpable.     No pedal edema     No ulcerations    LAB RESULTS:  Lab Results   Component Value Date    K 3.8 12/26/2017    K 4.1 03/17/2017    K 4.2 02/26/2016    CREATININE 0.9 12/26/2017    CREATININE 0.7 03/17/2017    CREATININE 0.8 02/26/2016     Lab Results   Component Value Date    WBC 8.38 12/26/2017    WBC 9.17 03/17/2017    WBC 5.60 12/16/2015    HCT 39.5 12/26/2017    HCT 39.9 03/17/2017    HCT 41.0 12/16/2015     12/26/2017     03/17/2017     12/16/2015     No results found for: HGBA1C  IMAGING:  CTA chest:  no arch lesions; no innominate, L subclavian or axillary artery lesion    Right Finger PPGs 8/14/17  Right: All digital PPG waveforms within normal limits except for the 2nd digit.  Abnormal flat wave PPG waveforms of the 2nd digit noted.     TTE: EF 65%, no valvular vegetations; normal bubble study    IMP/PLAN:  62 y.o. 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    Jayce Araya MD FACS  Vascular/Endovascular Surgery

## 2018-01-15 ENCOUNTER — OFFICE VISIT (OUTPATIENT)
Dept: INTERNAL MEDICINE | Facility: CLINIC | Age: 63
End: 2018-01-15
Payer: COMMERCIAL

## 2018-01-15 VITALS
BODY MASS INDEX: 31.24 KG/M2 | WEIGHT: 183 LBS | DIASTOLIC BLOOD PRESSURE: 76 MMHG | OXYGEN SATURATION: 98 % | RESPIRATION RATE: 18 BRPM | TEMPERATURE: 98 F | HEART RATE: 81 BPM | SYSTOLIC BLOOD PRESSURE: 124 MMHG | HEIGHT: 64 IN

## 2018-01-15 DIAGNOSIS — L02.512: ICD-10-CM

## 2018-01-15 DIAGNOSIS — E78.2 MIXED HYPERLIPIDEMIA: Primary | ICD-10-CM

## 2018-01-15 DIAGNOSIS — I75.012: ICD-10-CM

## 2018-01-15 PROCEDURE — 99214 OFFICE O/P EST MOD 30 MIN: CPT | Mod: S$GLB,,, | Performed by: INTERNAL MEDICINE

## 2018-01-15 PROCEDURE — 99999 PR PBB SHADOW E&M-EST. PATIENT-LVL IV: CPT | Mod: PBBFAC,,, | Performed by: INTERNAL MEDICINE

## 2018-01-15 RX ORDER — DICLOFENAC SODIUM 10 MG/G
2 GEL TOPICAL DAILY
COMMUNITY
End: 2018-09-06 | Stop reason: SDUPTHER

## 2018-01-15 RX ORDER — ACYCLOVIR 800 MG/1
800 TABLET ORAL
Qty: 35 TABLET | Refills: 0 | Status: SHIPPED | OUTPATIENT
Start: 2018-01-15 | End: 2018-01-25 | Stop reason: ALTCHOICE

## 2018-01-15 RX ORDER — LOSARTAN POTASSIUM AND HYDROCHLOROTHIAZIDE 25; 100 MG/1; MG/1
1 TABLET ORAL DAILY
COMMUNITY
End: 2018-07-01 | Stop reason: SDUPTHER

## 2018-01-15 RX ORDER — ROSUVASTATIN CALCIUM 20 MG/1
20 TABLET, COATED ORAL NIGHTLY
Qty: 90 TABLET | Refills: 3 | Status: SHIPPED | OUTPATIENT
Start: 2018-01-15 | End: 2019-01-28 | Stop reason: SDUPTHER

## 2018-01-15 RX ORDER — PANTOPRAZOLE SODIUM 40 MG/1
40 FOR SUSPENSION ORAL DAILY
COMMUNITY
End: 2018-09-06

## 2018-01-15 NOTE — PATIENT INSTRUCTIONS
Handwashing: Tips for Patients, Family, and Friends    Germs are everywhere around us. Normally, we live with germs without getting sick. In certain cases, harmful germs cause us to get sick with an infection. Or we can spread harmful germs to others and cause them to get sick. Keeping your hands clean is the best way to prevent getting or spreading germs that cause infection. Wash your hands with soap and water or use an alcohol-based hand .  When to clean your hands: For patients  In the hospital or in your home, you can come in contact with many harmful germs. To help prevent infection, wash your hands often, especially:  · After using the bathroom  · Before and after eating  · After coughing or sneezing  · After using a tissue  · After touching or changing a dressing or bandage  · After touching any object or surface that may be contaminated  · After touching an animal during a pet therapy session (hospital)  · After touching an animal, cleaning up after a pet, or preparing food for pets (home)  If you dont have access to soap and water, use an alcohol-based hand gel containing at least 60% alcohol. These products kill most germs and are easy to use. But use soap and water (not alcohol-based hand gel) if your hands are visibly dirty.  When to clean your hands: For family and friends  When visiting or caring for a loved one, washing your hands or using an alcohol-based hand  can help stop germs from spreading. Wash your hands:  · Before entering and after leaving the patients room  · As soon as you remove gloves or other protective clothing  · After changing a dressing or bandage  · After any contact with blood or other body fluids  · After touching or changing the patients bed linen or towels  · After touching an animal during a pet therapy session (hospital)  · After touching an animal, cleaning up after a pet, or preparing food for pets (home)  Many hospitals have sinks or gel dispensers  right outside patient rooms. If not, carry a bottle of alcohol-based hand gel with you, and use it every time you visit. Use soap and water (not alcohol-based hand gel) if your hands are visibly dirty.  Tips for good handwashing  Here are some suggestions to follow:  · Use warm water and plenty of soap. Work up a good lather.  · Clean the whole hand, including under your nails, between your fingers, and up the wrists.  · Wash for at least 15 to 20 seconds. Dont just wipe. Scrub well.  · Rinse, letting the water run down your fingers, not up your wrists.  · Dry your hands well. Use a paper towel to turn off the faucet and open the door.  Time matters  The longer you wash your hands, the more germs youll remove. Most people wash their hands for 6 to 7 seconds. But at least 15 seconds are needed to remove germs. Singing Happy Birthday or the Alphabet Song are examples of how long 15 seconds would be. To protect yourself and others from infection, washing for 30 seconds is best.  How to use an alcohol-based hand   Alcohol-based hand  may kill more germs than soap and water. Use them when your hands arent visibly dirty. For best results, follow these steps:  · Choose a gel or spray that contains at least 60 percent alcohol. Products with less alcohol may not kill germs.  · Spread about a tablespoon of  in the palm of one hand.  · Rub your hands together briskly, cleaning the backs of your hands, the palms, between your fingers, and up the wrists.  · Rub until the  is gone, and your hands are completely dry.  How do antibacterial soaps and alcohol-based hand  differ?  Antibacterial soaps:  · Come in liquid or bar form and are used with water  · Are no better at removing germs than plain soap  Alcohol-based hand :  · Come in gels or sprays that dont need water  · Are as or more effective than washing with soap and water   Date Last Reviewed: 12/1/2016  © 9019-9295 [employer]  [employer]. [address] [place] [address], [place] [address]. All rights reserved. This information is not intended as a substitute for professional medical care. Always follow your healthcare professional's instructions.

## 2018-01-15 NOTE — PROGRESS NOTES
"            Subjective:      Patient ID: Shanda Shaffer is a 62 y.o. female.    Chief Complaint: Wound (on left index finger) and Est PCP    HPI: 62 y.o. White female, here because she has had a lesion on her left index finger, now for months.  It began as a small dark brown spot, that now has increased in size, blistered, become painful and now  Last phalange has become blanchable. She was diagnosed as having a vaginal herpetic outbreak before  This lesion began.  However, she also had a lesion on the distal right index finger, ultimately diagnosed as being a cholesterol  Emboli, that resulted in amputation of the last phalanx of index finger. This was done at [facility].  She has been seen by a vascular surgeon, would did an  extensive workup, determining that she has good  Blood flow to fingers.      I have read/reviewed all imaging,labs,notes from previous providers.  Review of Systems   Constitutional: Negative.    HENT: Negative.    Eyes: Negative.    Respiratory: Negative for cough, choking, chest tightness, shortness of breath and wheezing.    Cardiovascular: Negative for chest pain, palpitations and leg swelling.   Gastrointestinal: Positive for abdominal pain. Negative for constipation, diarrhea, nausea and vomiting.   Endocrine: Negative.    Genitourinary: Positive for dysuria, frequency, genital sores and vaginal pain.   Musculoskeletal: Negative.    Skin: Positive for color change and wound.   Neurological: Negative for dizziness, seizures, weakness and headaches.   Hematological: Negative.    Psychiatric/Behavioral: Positive for sleep disturbance.       Objective:   /76 (BP Location: Right arm, Patient Position: Sitting, BP Method: Large (Manual))   Pulse 81   Temp 98.2 °F (36.8 °C) (Oral)   Resp 18   Ht 5' 4" (1.626 m)   Wt 83 kg (182 lb 15.7 oz)   SpO2 98%   BMI 31.41 kg/m²     Physical Exam   Constitutional: She appears well-developed and well-nourished.   HENT:   Head: Normocephalic. "   Right Ear: External ear normal.   Left Ear: External ear normal.   Nose: Nose normal.   Mouth/Throat: Oropharynx is clear and moist.   Eyes: Conjunctivae and EOM are normal. Pupils are equal, round, and reactive to light.   Neck: Trachea normal and normal range of motion. Neck supple. Normal carotid pulses and no JVD present. Carotid bruit is not present. No thyroid mass present.   Cardiovascular: Normal rate, regular rhythm, normal heart sounds and normal pulses.    Pulses:       Carotid pulses are 2+ on the right side, and 2+ on the left side.       Radial pulses are 2+ on the right side, and 2+ on the left side.        Femoral pulses are 2+ on the right side, and 2+ on the left side.       Popliteal pulses are 2+ on the right side, and 2+ on the left side.        Dorsalis pedis pulses are 2+ on the right side, and 2+ on the left side.        Posterior tibial pulses are 2+ on the right side, and 2+ on the left side.   Pulmonary/Chest: Effort normal and breath sounds normal.   Abdominal: Soft. Bowel sounds are normal.   Musculoskeletal: She exhibits tenderness. She exhibits no edema.   Skin: Skin is warm and dry.   See photos of wound   Psychiatric: She has a normal mood and affect. Her behavior is normal.   Nursing note and vitals reviewed.      Assessment:     1. Mixed hyperlipidemia    2. Atheroembolism of finger, left    3. 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   Need to review the pathology from previous amputation to see if it was an atheroembolism.  Plan:     Mixed hyperlipidemia  -     rosuvastatin (CRESTOR) 20 MG tablet; Take 1 tablet (20 mg total) by mouth every evening.  Dispense: 90 tablet; Refill: 3    Atheroembolism of finger, left  -     rosuvastatin (CRESTOR) 20 MG tablet; Take 1 tablet (20 mg total) by mouth every evening.  Dispense: 90 tablet; Refill: 3    Leroy, left  -     acyclovir (ZOVIRAX) 800 MG Tab; Take 1 tablet (800 mg  total) by mouth 5 (five) times daily.  Dispense: 35 tablet; Refill: 0

## 2018-01-16 ENCOUNTER — TELEPHONE (OUTPATIENT)
Dept: INTERNAL MEDICINE | Facility: CLINIC | Age: 63
End: 2018-01-16

## 2018-01-16 NOTE — TELEPHONE ENCOUNTER
----- Message from Hector Key sent at 1/16/2018  4:38 PM CST -----  Contact: [phone]/self  Pt calling in to let you know her prescriptions is not going through (name of prescriptions unknown).  Please advise

## 2018-01-16 NOTE — TELEPHONE ENCOUNTER
Spoke to patient the issue was she just had a statin filled and her medication was changed. The pharmacy got the problem fixed and will notify the patient.

## 2018-01-18 ENCOUNTER — TELEPHONE (OUTPATIENT)
Dept: INTERNAL MEDICINE | Facility: CLINIC | Age: 63
End: 2018-01-18

## 2018-01-18 NOTE — TELEPHONE ENCOUNTER
----- Message from Madeline Mendoza sent at 1/18/2018 11:54 AM CST -----  Contact: self / [phone]  Patient is requesting a call back regarding a growth on her finger. Please advise

## 2018-01-19 ENCOUNTER — HOSPITAL ENCOUNTER (EMERGENCY)
Facility: HOSPITAL | Age: 63
Discharge: HOME | End: 2018-01-19
Attending: EMERGENCY MEDICINE
Payer: COMMERCIAL

## 2018-01-19 ENCOUNTER — OFFICE VISIT (OUTPATIENT)
Dept: INTERNAL MEDICINE | Facility: CLINIC | Age: 63
End: 2018-01-19
Payer: COMMERCIAL

## 2018-01-19 VITALS
HEART RATE: 79 BPM | WEIGHT: 182.56 LBS | TEMPERATURE: 98 F | OXYGEN SATURATION: 97 % | BODY MASS INDEX: 31.17 KG/M2 | DIASTOLIC BLOOD PRESSURE: 76 MMHG | HEIGHT: 64 IN | SYSTOLIC BLOOD PRESSURE: 134 MMHG | RESPIRATION RATE: 18 BRPM

## 2018-01-19 VITALS
HEIGHT: 64 IN | OXYGEN SATURATION: 96 % | HEART RATE: 74 BPM | TEMPERATURE: 99 F | BODY MASS INDEX: 31.17 KG/M2 | DIASTOLIC BLOOD PRESSURE: 65 MMHG | WEIGHT: 182.56 LBS | SYSTOLIC BLOOD PRESSURE: 132 MMHG | RESPIRATION RATE: 98 BRPM

## 2018-01-19 DIAGNOSIS — Z87.891 FORMER SMOKER: ICD-10-CM

## 2018-01-19 DIAGNOSIS — M79.645 PAIN OF FINGER OF LEFT HAND: ICD-10-CM

## 2018-01-19 DIAGNOSIS — E78.2 MIXED HYPERLIPIDEMIA: ICD-10-CM

## 2018-01-19 DIAGNOSIS — R09.89 ALTERED TISSUE PERFUSION: Primary | ICD-10-CM

## 2018-01-19 DIAGNOSIS — R09.89 ALTERED TISSUE PERFUSION: ICD-10-CM

## 2018-01-19 DIAGNOSIS — L98.499 ISCHEMIC ULCER OF FINGER, UNSPECIFIED ULCER STAGE: Primary | ICD-10-CM

## 2018-01-19 LAB
ALBUMIN SERPL BCP-MCNC: 3.8 G/DL
ALP SERPL-CCNC: 77 U/L
ALT SERPL W/O P-5'-P-CCNC: 28 U/L
ANION GAP SERPL CALC-SCNC: 9 MMOL/L
AST SERPL-CCNC: 23 U/L
BASOPHILS # BLD AUTO: 0.03 K/UL
BASOPHILS NFR BLD: 0.4 %
BILIRUB SERPL-MCNC: 0.5 MG/DL
BUN SERPL-MCNC: 15 MG/DL
CALCIUM SERPL-MCNC: 9.7 MG/DL
CHLORIDE SERPL-SCNC: 102 MMOL/L
CO2 SERPL-SCNC: 27 MMOL/L
CREAT SERPL-MCNC: 0.8 MG/DL
DIFFERENTIAL METHOD: ABNORMAL
EOSINOPHIL # BLD AUTO: 0.2 K/UL
EOSINOPHIL NFR BLD: 2.1 %
ERYTHROCYTE [DISTWIDTH] IN BLOOD BY AUTOMATED COUNT: 12.4 %
EST. GFR  (AFRICAN AMERICAN): >60 ML/MIN/1.73 M^2
EST. GFR  (NON AFRICAN AMERICAN): >60 ML/MIN/1.73 M^2
GLUCOSE SERPL-MCNC: 102 MG/DL
HCT VFR BLD AUTO: 39.9 %
HGB BLD-MCNC: 13.5 G/DL
IMM GRANULOCYTES # BLD AUTO: 0.03 K/UL
IMM GRANULOCYTES NFR BLD AUTO: 0.4 %
INR PPP: 0.9
LYMPHOCYTES # BLD AUTO: 1.6 K/UL
LYMPHOCYTES NFR BLD: 22.8 %
MCH RBC QN AUTO: 30.7 PG
MCHC RBC AUTO-ENTMCNC: 33.8 G/DL
MCV RBC AUTO: 91 FL
MONOCYTES # BLD AUTO: 0.5 K/UL
MONOCYTES NFR BLD: 6.3 %
NEUTROPHILS # BLD AUTO: 4.8 K/UL
NEUTROPHILS NFR BLD: 68 %
NRBC BLD-RTO: 0 /100 WBC
PLATELET # BLD AUTO: 333 K/UL
PMV BLD AUTO: 8.8 FL
POTASSIUM SERPL-SCNC: 3.6 MMOL/L
PROT SERPL-MCNC: 7.2 G/DL
PROTHROMBIN TIME: 9.8 SEC
RBC # BLD AUTO: 4.4 M/UL
SODIUM SERPL-SCNC: 138 MMOL/L
WBC # BLD AUTO: 7.12 K/UL

## 2018-01-19 PROCEDURE — 99999 PR PBB SHADOW E&M-EST. PATIENT-LVL III: CPT | Mod: PBBFAC,,, | Performed by: INTERNAL MEDICINE

## 2018-01-19 PROCEDURE — 85610 PROTHROMBIN TIME: CPT

## 2018-01-19 PROCEDURE — 99221 1ST HOSP IP/OBS SF/LOW 40: CPT | Mod: ,,, | Performed by: SURGERY

## 2018-01-19 PROCEDURE — 96374 THER/PROPH/DIAG INJ IV PUSH: CPT

## 2018-01-19 PROCEDURE — 63600175 PHARM REV CODE 636 W HCPCS: Performed by: PHYSICIAN ASSISTANT

## 2018-01-19 PROCEDURE — 99214 OFFICE O/P EST MOD 30 MIN: CPT | Mod: S$GLB,,, | Performed by: INTERNAL MEDICINE

## 2018-01-19 PROCEDURE — 80053 COMPREHEN METABOLIC PANEL: CPT

## 2018-01-19 PROCEDURE — 99284 EMERGENCY DEPT VISIT MOD MDM: CPT | Mod: ,,, | Performed by: PHYSICIAN ASSISTANT

## 2018-01-19 PROCEDURE — 25000003 PHARM REV CODE 250: Performed by: PHYSICIAN ASSISTANT

## 2018-01-19 PROCEDURE — 99284 EMERGENCY DEPT VISIT MOD MDM: CPT | Mod: 25

## 2018-01-19 PROCEDURE — 85025 COMPLETE CBC W/AUTO DIFF WBC: CPT

## 2018-01-19 RX ORDER — HEPARIN SODIUM,PORCINE/D5W 25000/250
17 INTRAVENOUS SOLUTION INTRAVENOUS CONTINUOUS
Status: DISCONTINUED | OUTPATIENT
Start: 2018-01-19 | End: 2018-01-19

## 2018-01-19 RX ORDER — HYDROCODONE BITARTRATE AND ACETAMINOPHEN 5; 325 MG/1; MG/1
1 TABLET ORAL EVERY 6 HOURS PRN
Qty: 8 TABLET | Refills: 0 | Status: SHIPPED | OUTPATIENT
Start: 2018-01-19 | End: 2018-01-29

## 2018-01-19 RX ADMIN — NITROGLYCERIN 0.5 INCH: 20 OINTMENT TOPICAL at 11:01

## 2018-01-19 RX ADMIN — HEPARIN SODIUM 17 UNITS/KG/HR: 10000 INJECTION, SOLUTION INTRAVENOUS at 12:01

## 2018-01-19 NOTE — ED NOTES
Patient identifiers verified and correct for Shanda Shankartonysarah.    LOC: The patient is awake, alert and aware of environment with an appropriate affect, the patient is oriented x 3 and speaking appropriately.  APPEARANCE: Patient resting comfortably and in no acute distress, patient is clean and well groomed, patient's clothing is properly fastened.  SKIN: The skin is warm and dry, color consistent with ethnicity, patient has normal skin turgor and moist mucus membranes, skin intact, no breakdown or bruising noted.  MUSCULOSKELETAL: Patient moving all extremities spontaneously, no obvious swelling or deformities noted.  RESPIRATORY: Airway is open and patent, respirations are spontaneous, patient has a normal effort and rate, no accessory muscle use noted, bilateral breath sounds clear and present.   CARDIAC: Patient has a normal rate and regular rhythm, no periphreal edema noted, capillary refill < 3 seconds.  ABDOMEN: Soft and non tender to palpation, no distention noted, normoactive bowel sounds present in all four quadrants.  NEUROLOGIC: PERRL, 3 mm bilaterally, eyes open spontaneously, behavior appropriate to situation, follows commands, facial expression symmetrical, bilateral hand grasp equal and even, purposeful motor response noted, normal sensation in all extremities when touched with a finger.

## 2018-01-19 NOTE — CONSULTS
Shanda Shaffer  2018    HPI:  Patient is a 62 y.o. female with a HTN and HLD who is known to Dr. Araya and was seen in clinic on 18 Left 2nd finger discoloration -   She has already undergone R 2nd digit tip amputation.  In Aug 2017, she went to urgent care yesterday and was told to take ASA and take pain meds as needed and come to vascular appointment today.  She is not taking a statin.  She denies history of autoimmune problems.    There has not been significant change since her visit to Dr. Araya's office however, she was getting increasingly concerned with left 2nd finger cyanosis and pain and contacted her PCP who told her to visit Ed.   She has had battery of tests to evaluate her for possible thomboembolic source as well as cardioembolic source, all of which came back negative.     No personal or family history of coagulation abnormalities  No DVT/PE  No  MI/stroke  Tobacco use: Quit 8 years ago; 15 pack year history     Works as in IT department at [employer]     PMD is Dr Cintia Villalta ()    Past Medical History:   Diagnosis Date    Acne vulgaris 2015    Benign hypertension 2015    Bladder prolapse, female, acquired 2015    Diverticulosis of large intestine without hemorrhage 2015    Esophageal stricture 2015    Former smoker: 1 pack a day for 30 years, quit 3/11/10 3/17/2017    Gastroesophageal reflux disease without esophagitis 2015    Hyperlipidemia     Non morbid obesity due to excess calories 2015    Primary osteoarthritis of both knees 2015     Past Surgical History:   Procedure Laterality Date    bilateral knee surgery  2016    BREAST SURGERY      cyst removal     SECTION      x 4    HAND SURGERY      JOINT REPLACEMENT      KNEE SURGERY Bilateral 2016    TK    TONSILLECTOMY       Family History   Problem Relation Age of Onset    Diabetes Mother     Heart disease Mother     Arthritis Mother      Hypertension Father     Heart disease Father      PPM    Heart attack Father     Diabetes Sister     Heart disease Sister      valve replacement    Diabetes Brother     Arthritis Brother      RA    No Known Problems Son     Cancer Maternal Grandmother      breast    Stroke Maternal Grandmother     No Known Problems Son     No Known Problems Son     No Known Problems Son     Heart failure Neg Hx     Hyperlipidemia Neg Hx      Social History     Social History    Marital status:      Spouse name: N/A    Number of children: 4    Years of education: N/A     Occupational History    Not on file.     Social History Main Topics    Smoking status: Former Smoker     Packs/day: 1.00     Years: 30.00     Types: Cigarettes     Quit date: 3/11/2010    Smokeless tobacco: Never Used    Alcohol use Yes      Comment: occasional    Drug use: No    Sexual activity: Not Currently     Other Topics Concern    Not on file     Social History Narrative    No narrative on file     Review of patient's allergies indicates:  No Known Allergies    Current Facility-Administered Medications:     heparin 25,000 units in dextrose 5% 250 mL (100 units/mL) infusion, 17 Units/kg/hr (Adjusted), Intravenous, Continuous, Ghazal Powell PA-C, Last Rate: 11.2 mL/hr at 01/19/18 1238, 17 Units/kg/hr at 01/19/18 1238    Current Outpatient Prescriptions:     acyclovir (ZOVIRAX) 800 MG Tab, Take 1 tablet (800 mg total) by mouth 5 (five) times daily., Disp: 35 tablet, Rfl: 0    amLODIPine (NORVASC) 10 MG tablet, Take 1 tablet (10 mg total) by mouth once daily., Disp: 30 tablet, Rfl: 3    aspirin (ECOTRIN) 81 MG EC tablet, Take 2 tablets (162 mg total) by mouth once daily., Disp: 60 tablet, Rfl: 11    diclofenac sodium 1 % Gel, Apply 2 g topically once daily., Disp: , Rfl:     estradiol (ESTRING) 2 mg (7.5 mcg /24 hour) vaginal ring, Place 2 mg vaginally every 3 (three) months., Disp: , Rfl:     losartan-hydrochlorothiazide  100-25 mg (HYZAAR) 100-25 mg per tablet, Take 1 tablet by mouth once daily., Disp: , Rfl:     meloxicam (MOBIC) 15 MG tablet, TAKE ONE TABLET BY MOUTH ONCE DAILY, Disp: 30 tablet, Rfl: 0    pantoprazole (PROTONIX) 40 mg GrPS, Take 40 mg by mouth once daily., Disp: , Rfl:     rosuvastatin (CRESTOR) 20 MG tablet, Take 1 tablet (20 mg total) by mouth every evening., Disp: 90 tablet, Rfl: 3    trospium (SANCTURA) 20 mg Tab tablet, Take 1 tablet (20 mg total) by mouth 2 (two) times daily., Disp: 60 tablet, Rfl: 0    hydrocodone-acetaminophen 5-325mg (NORCO) 5-325 mg per tablet, Take 1 tablet by mouth every 6 (six) hours as needed for Pain. No alcohol, no driving, no operating machinery, no working, no swimming, no extra Tylenol (acetaminophen) while taking this medication., Disp: 8 tablet, Rfl: 0    REVIEW OF SYSTEMS:  General: negative;   ENT: negative;   Allergy and Immunology: negative;   Hematological and Lymphatic: Negative;   Endocrine: negative;   Respiratory: no cough, shortness of breath, or wheezing;   Cardiovascular: no chest pain or dyspnea on exertion;   Gastrointestinal: no abdominal pain/back, change in bowel habits, or bloody stools; Genito-Urinary: no dysuria, trouble voiding, or hematuria;   Musculoskeletal: negative  Neurological: no TIA or stroke symptoms;   Psychiatric: no nervousness, anxiety or depression.    PHYSICAL EXAM:      Pulse: 74  Temp: 98.6 °F (37 °C)      General appearance:  Alert, well-appearing, and in no distress.  Oriented to person, place, and time   Neurological:  Normal speech, no focal findings noted; CN II - XII grossly intact           Musculoskeletal: S/P right 2nd distal tip of finger amputation. Healing.       Left 2nd finger tip with cyanosis and focal gangrene without infection.  Normal muscle strength/tone.                 Neck: Supple, no significant adenopathy; thyroid is not enlarged                Chest:  Clear to auscultation, no wheezes, rales or rhonchi,  symmetric air entry      No use of accessory muscles             Cardiac: Normal rate and regular rhythm, S1 and S2 normal; PMI non-displaced          Abdomen: Soft, nontender, nondistended, no masses or organomegaly      Extremities:   2+ bilateral radial pulses.       2+ femoral pulses bilaterally    LAB RESULTS:  Lab Results   Component Value Date    K 3.6 01/19/2018    K 3.8 12/26/2017    K 4.1 03/17/2017    CREATININE 0.8 01/19/2018    CREATININE 0.9 12/26/2017    CREATININE 0.7 03/17/2017     Lab Results   Component Value Date    WBC 7.12 01/19/2018    WBC 8.38 12/26/2017    WBC 9.17 03/17/2017    HCT 39.9 01/19/2018    HCT 39.5 12/26/2017    HCT 39.9 03/17/2017     01/19/2018     12/26/2017     03/17/2017     No results found for: HGBA1C    IMAGING:  CTA chest:  no arch lesions; no innominate, L subclavian or axillary artery lesion     Right Finger PPGs 8/14/17  Right: All digital PPG waveforms within normal limits except for the 2nd digit.  Abnormal flat wave PPG waveforms of the 2nd digit noted.      TTE: EF 65%, no valvular vegetations; normal bubble study    IMP/PLAN:  62 y.o. female with history of HTN and HLD with R, L 2nd finger: likely etiology is cholesterol embolus. Unfortunately battery of exams looking for thromboembolic/cardioembolic sources have turned out to be negative. She has been seen by Rheumatology and does not know when her next appointment is.     Unfortunately no revascularization option from vascular surgery standpoint.   Recommend DENISE as outpatient to R/O cardiac vegetation as a source although 2D echo did not show cardiac vegetation it is not as sensitive. If this worsens and progresses, unfortunately finger tip amputation may be inevitable.    Jean Paul Garg MD  Vascular/Endovascular Surgery Fellow

## 2018-01-19 NOTE — ED PROVIDER NOTES
Encounter Date: 2018       History     Chief Complaint   Patient presents with    Circulatory Problem     sent from md office, 'black tip to L index and middle finger, cold to touch     61 yo F with a PMH significant for HLD, GERD presents to the ED sent from primary care clinic with concerns of ischemic digit.  Pt believes that she may have had a splinter in the finger several weeks ago. She has had increasing pain and skin color changes to the finger. Pt seen at PCP today and was urged to go to ED for further management.  No further complaints.           Review of patient's allergies indicates:  No Known Allergies  Past Medical History:   Diagnosis Date    Acne vulgaris 2015    Benign hypertension 2015    Bladder prolapse, female, acquired 2015    Diverticulosis of large intestine without hemorrhage 2015    Esophageal stricture 2015    Former smoker: 1 pack a day for 30 years, quit 3/11/10 3/17/2017    Gastroesophageal reflux disease without esophagitis 2015    Hyperlipidemia     Non morbid obesity due to excess calories 2015    Primary osteoarthritis of both knees 2015     Past Surgical History:   Procedure Laterality Date    bilateral knee surgery  2016    BREAST SURGERY      cyst removal     SECTION      x 4    HAND SURGERY      JOINT REPLACEMENT      KNEE SURGERY Bilateral 2016    TK    TONSILLECTOMY       Family History   Problem Relation Age of Onset    Diabetes Mother     Heart disease Mother     Arthritis Mother     Hypertension Father     Heart disease Father      PPM    Heart attack Father     Diabetes Sister     Heart disease Sister      valve replacement    Diabetes Brother     Arthritis Brother      RA    No Known Problems Son     Cancer Maternal Grandmother      breast    Stroke Maternal Grandmother     No Known Problems Son     No Known Problems Son     No Known Problems Son     Heart failure  Neg Hx     Hyperlipidemia Neg Hx      Social History   Substance Use Topics    Smoking status: Former Smoker     Packs/day: 1.00     Years: 30.00     Types: Cigarettes     Quit date: 3/11/2010    Smokeless tobacco: Never Used    Alcohol use Yes      Comment: occasional     Review of Systems   Constitutional: Negative for fever.   HENT: Negative for sore throat.    Respiratory: Negative for shortness of breath.    Cardiovascular: Negative for chest pain.   Gastrointestinal: Negative for nausea.   Genitourinary: Negative for dysuria.   Musculoskeletal: Negative for back pain.        Finger pain - L    Skin: Positive for color change and wound. Negative for rash.   Neurological: Negative for weakness.   Hematological: Does not bruise/bleed easily.       Physical Exam     Initial Vitals   BP Pulse Resp Temp SpO2   -- -- -- -- --      MAP       --         Physical Exam    Nursing note and vitals reviewed.  Constitutional: She appears well-developed and well-nourished. She is not diaphoretic.  Non-toxic appearance. She does not appear ill. No distress.   HENT:   Head: Normocephalic and atraumatic.   Neck: Neck supple.   Cardiovascular: Normal rate and regular rhythm. Exam reveals no gallop and no friction rub.    No murmur heard.  Pulmonary/Chest: Effort normal and breath sounds normal. No accessory muscle usage. No tachypnea. No respiratory distress. She has no decreased breath sounds. She has no wheezes. She has no rhonchi. She has no rales.   Abdominal: Normal appearance. She exhibits no distension.   Musculoskeletal: Normal range of motion.   Coolness and purplish coloring to the tip of the L index finger. Brownish black lesion underlying the skin of the finger pad.   Diminished cap refill   Radial and ulnar pulses 2+   Neurological: She is alert and oriented to person, place, and time.   Skin: Skin is warm and dry. No rash noted. No pallor.   Psychiatric: She has a normal mood and affect. Her behavior is normal.  Judgment and thought content normal.         ED Course   Procedures  Labs Reviewed   CBC W/ AUTO DIFFERENTIAL - Abnormal; Notable for the following:        Result Value    MPV 8.8 (*)     All other components within normal limits   COMPREHENSIVE METABOLIC PANEL   PROTIME-INR             Medical Decision Making:   History:   Old Medical Records: I decided to obtain old medical records.  Differential Diagnosis:   My differential diagnosis includes but is not limited to:  Ischemic digit, arterial thromboembolism, cellulitis, abscess.        APC / Resident Notes:   63 yo F sent from PCP for further evaluation for possible ischemic digit.  Physical exam findings noted above.  We will order blood work, began heparin infusion and consult vascular surgery emergently.     Vascular surgery has emergently evaluated the patient.  They feel that she is stable for discharge.  They recommend outpatient DENISE, but feel that there are no revascularization options from vascular surgery standpoint and amputation is highly likely.  I will discharge patient in stable condition with pain medication.  Patient instructed to follow up with primary care doctor.  Return precautions given. I have reviewed the patient's records and discussed this case with my supervising physician.           Attending Attestation:     Physician Attestation Statement for NP/PA:   I discussed this assessment and plan of this patient with the NP/PA, but I did not personally examine the patient. The face to face encounter was performed by the NP/PA.    Other NP/PA Attestation Additions:    History of Present Illness: 62-year-old woman with previous history of face occlusive crisis presents for evaluation of discoloration to the fingers of her left hand intermittently for the past 2 weeks.                   ED Course      Clinical Impression:   The primary encounter diagnosis was Altered tissue perfusion. A diagnosis of Pain of finger of left hand was also pertinent  to this visit.    Disposition:   Disposition: Discharged  Condition: Stable                       Ghazal Powell PA-C  01/19/18 4159

## 2018-01-19 NOTE — PATIENT INSTRUCTIONS
Taking Your Blood Pressure  Blood pressure is the force of blood against the artery wall as it moves from the heart through the blood vessels. You can take your own blood pressure reading using a digital monitor. Take your readings the same each time, using the same arm. Take readings as often as your healthcare provider instructs.  About blood pressure monitors  Blood pressure monitors are designed for certain ages and cases. You can find monitors for older adults, for pregnant women, and for children. Make sure the one you choose is the right one for your age and situation.  The American Heart Association recommends an automatic cuff monitor that fits on your upper arm (bicep). The cuff should fit your arm size. A cuff thats too large or too small will not give an accurate reading. Measure around your upper arm to find your size.  Monitors that attach to your finger or wrist are not as accurate as monitors for your upper arm.  Ask your healthcare provider for help in choosing a monitor. Bring your monitor to your next provider visit if you need help in using it the correct way.  The steps below are general instructions for using an automatic digital monitor.  Step 1. Relax    · Take your blood pressure at the same time every day, such as in the morning or evening, or at the time your healthcare provider recommends.  · Wait at least a half-hour after smoking, eating, or exercising. Don't drink coffee, tea, soda, or other caffeinated beverages before checking your blood pressure.  · Sit comfortably at a table with both feet on the floor. Do not cross your legs or feet. Place the monitor near you.  · Rest for a few minutes before you begin.  Step 2. Wrap the cuff    · Place your arm on the table, palm up. Your arm should be at the level of your heart. Wrap the cuff around your upper arm, just above your elbow. Its best done on bare skin, not over clothing. Most cuffs will indicate where the brachial artery (the  blood vessel in the middle of the arm at the inner side of the elbow) should line up with the cuff. Look in your monitor's instruction booklet for an illustration. You can also bring your cuff to your healthcare provider and have them show you how to correctly place the cuff.  Step 3. Inflate the cuff    · Push the button that starts the pump.  · The cuff will tighten, then loosen.  · The numbers will change. When they stop changing, your blood pressure reading will appear.  · Take 2 or 3 readings one minute apart.  Step 4. Write down the results of each reading    · Write down your blood pressure numbers for each reading. Note the date and time. Keep your results in one place, such as a notebook. Even if your monitor has a built-in memory, keep a hard copy of the readings.  · Remove the cuff from your arm. Turn off the machine.  · Bring your blood pressure records with your healthcare providers at each visit.  · If you start a new blood pressure medicine, note the day you started the new medicine. Also note the day if you change the dose of your medicine. This information goes on your blood pressure recording sheet. This will help your healthcare provider monitor how well the medicine changes are working.  · Ask your healthcare provider what numbers should prompt you to call him or her. Also ask what numbers should prompt you to get help right away.  Date Last Reviewed: 11/1/2016  © 1632-9673 [employer]. [address] [place] [address], [place] [address]. All rights reserved. This information is not intended as a substitute for professional medical care. Always follow your healthcare professional's instructions.

## 2018-01-19 NOTE — ED NOTES
"As this nurse entered pt room found pt sobbing. "I just can't loose another finger, please save my finger!" Area to left middle finger appears purple with white [place] but black to center. States right index finger tip was lost when she had been on vacation and had a hangnail. Right fingertip removed 4/2017. "they made me wait until it turned gangrene to take it off and the pain was awful." States she also has a hangnail this time. Finger tip is cool to touch.Pain is 7/10.  "

## 2018-01-20 ENCOUNTER — PATIENT MESSAGE (OUTPATIENT)
Dept: INTERNAL MEDICINE | Facility: CLINIC | Age: 63
End: 2018-01-20

## 2018-01-22 ENCOUNTER — TELEPHONE (OUTPATIENT)
Dept: INTERNAL MEDICINE | Facility: CLINIC | Age: 63
End: 2018-01-22

## 2018-01-24 ENCOUNTER — TELEPHONE (OUTPATIENT)
Dept: RHEUMATOLOGY | Facility: CLINIC | Age: 63
End: 2018-01-24

## 2018-01-24 ENCOUNTER — OFFICE VISIT (OUTPATIENT)
Dept: RHEUMATOLOGY | Facility: CLINIC | Age: 63
End: 2018-01-24
Payer: COMMERCIAL

## 2018-01-24 VITALS
HEIGHT: 64 IN | BODY MASS INDEX: 30.11 KG/M2 | HEART RATE: 74 BPM | DIASTOLIC BLOOD PRESSURE: 78 MMHG | WEIGHT: 176.38 LBS | SYSTOLIC BLOOD PRESSURE: 140 MMHG

## 2018-01-24 DIAGNOSIS — R76.8 ANA POSITIVE: ICD-10-CM

## 2018-01-24 DIAGNOSIS — I75.011 ATHEROEMBOLISM OF RIGHT UPPER EXTREMITY: ICD-10-CM

## 2018-01-24 DIAGNOSIS — K25.7 CHRONIC GASTRIC ULCER WITHOUT HEMORRHAGE AND WITHOUT PERFORATION: ICD-10-CM

## 2018-01-24 DIAGNOSIS — R09.89 ALTERED TISSUE PERFUSION: Primary | ICD-10-CM

## 2018-01-24 DIAGNOSIS — Z87.891 FORMER SMOKER: ICD-10-CM

## 2018-01-24 DIAGNOSIS — I10 BENIGN HYPERTENSION: ICD-10-CM

## 2018-01-24 PROCEDURE — 99999 PR PBB SHADOW E&M-EST. PATIENT-LVL III: CPT | Mod: PBBFAC,,, | Performed by: INTERNAL MEDICINE

## 2018-01-24 PROCEDURE — 99214 OFFICE O/P EST MOD 30 MIN: CPT | Mod: S$GLB,,, | Performed by: INTERNAL MEDICINE

## 2018-01-24 NOTE — PROGRESS NOTES
"Subjective:       Patient ID: Shanda Shaffer is a 62 y.o. female.    Chief Complaint: Pain of the Left Hand    62YF with hx HTN, HLD, GERD ( EGD showing ulcers per pt on PPI by Dr Olivo @ [facility]), OA of b/l knees s/p joint replacement b/l referred by Dr Araya for TOYA+ve in the setting of atheroembolism.  Since last visit, pt called stating her finger getting worse and we asked her to come in for a sooner appt for further evaluation. Pt was seen by Hand surgeon who recommended another opinion from another vascular surgery. Pt stated that he did not have anything to add and gave her a script for Nifedipine which she did not take. Pt reports that her pain is worsening and more 2nd digit discoloration with ulceration.  She was seen by PCP for worsening symptoms and sent to the ED 01/19/18 and was seen by Vascular Surgery who stated "likely etiology is cholesterol embolus. If this worsens and progresses, unfortunately finger tip amputation may be inevitable".  Pt states that when she received heparin in the ED, she noted an improvement in the discoloration of her finger.  Pt was crying during encounter and was really upset regarding her finger and watching it " die".       Pt denies fever, chills, CP, SOB, abd pain, changes in bowel/bladder habits, oral/genital ulcers, skin rash, alopecia, joint swelling/pain, new skin lesions.     Initial history  62YF with hx HTN, HLD, GERD ( EGD showing ulcers per pt on PPI by Dr Olivo @ [facility]), OA of b/l knees s/p joint replacement b/l referred by Dr Araya for TOYA+ve in the setting of atheroembolism. Pt reports R 2nd digit which started off as a " hang nail" and became gangrenous seen by Vascular surgery ? Cholesterol embolism. Amputation on Oct 4th 2017. Workup done TOYA 1: 1280 with neg profile, CTA chest with mild atherosclerosis of the aortic arch, no dissection or aneurysms, no LAD and nodular opacity 0.8cm in KSENIA pleura. 2D ECHO done 09/17 showed no evidence of " "vegetations or thrombi. EF 60-65%.  Pt reported around Dec 15th she developed "silver on 2nd digit and hang nail on 3rd digit L hand" now cold on the tips, discolored and painful to touch  Pt is currently on ASA. Seen by Hand surgeon Dr Thorne and started on Levaquin for changes on L hand. Seen by Hematology Dr Reaves who has ordered CTA of L upper extremity for further evaluation of ischemic digits    + weight loss 10lb after amputation of the R digit which she attributed to pain meds causing loss of appetite. Pt reports an episode of finger turning white on her 4th digit on R hand. 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 AM stiffness for about 15 mins.   Pt denies fatigue, oral/nasal/genital ulcers, headaches, fever, chills, n/v, abd pain, CP, SOB, dysphagia, hemoptysis, recent travel, changes in bowel/bladder habits, pleurisy, pericarditis, vision changes,tight skin, thromoboses, photosensitivity, skin rash, raynauds, alopecia, joint swelling or erythema.    family history of autoimmune disease : Niece SLE ,Brother RA    Works as  in [employer]).Sexual active with  of 30yrs, no other contacts. hx of miscarriages X1 in the 1st trimester  1 . Had 4 children with no complications.    Used to smoke 1pk/dayX20 yrs quit in . Used to use THC and snort cocaine ~ 20yrs ago not anymore.               She reports no joint swelling. Pertinent negatives include no fever or trouble swallowing.              Past Medical History:   Diagnosis Date    Acne vulgaris 2015    Benign hypertension 2015    Bladder prolapse, female, acquired 2015    Diverticulosis of large intestine without hemorrhage 2015    Esophageal stricture 2015    Former smoker: 1 pack a day for 30 years, quit 3/11/10 3/17/2017    Gastroesophageal reflux disease without esophagitis 2015    Hyperlipidemia     Non morbid obesity due to " excess calories 2015    Primary osteoarthritis of both knees 2015     Past Surgical History:   Procedure Laterality Date    bilateral knee surgery  2016    BREAST SURGERY      cyst removal     SECTION      x 4    HAND SURGERY      JOINT REPLACEMENT      KNEE SURGERY Bilateral 2016    TK    TONSILLECTOMY       Family History   Problem Relation Age of Onset    Diabetes Mother     Heart disease Mother     Arthritis Mother     Hypertension Father     Heart disease Father      PPM    Heart attack Father     Diabetes Sister     Heart disease Sister      valve replacement    Diabetes Brother     Arthritis Brother      RA    No Known Problems Son     Cancer Maternal Grandmother      breast    Stroke Maternal Grandmother     No Known Problems Son     No Known Problems Son     No Known Problems Son     Heart failure Neg Hx     Hyperlipidemia Neg Hx          Current Outpatient Prescriptions on File Prior to Visit   Medication Sig Dispense Refill    amLODIPine (NORVASC) 10 MG tablet Take 1 tablet (10 mg total) by mouth once daily. 30 tablet 3    aspirin (ECOTRIN) 81 MG EC tablet Take 2 tablets (162 mg total) by mouth once daily. 60 tablet 11    diclofenac sodium 1 % Gel Apply 2 g topically once daily.      estradiol (ESTRING) 2 mg (7.5 mcg /24 hour) vaginal ring Place 2 mg vaginally every 3 (three) months.      hydrocodone-acetaminophen 5-325mg (NORCO) 5-325 mg per tablet Take 1 tablet by mouth every 6 (six) hours as needed for Pain. No alcohol, no driving, no operating machinery, no working, no swimming, no extra Tylenol (acetaminophen) while taking this medication. 8 tablet 0    losartan-hydrochlorothiazide 100-25 mg (HYZAAR) 100-25 mg per tablet Take 1 tablet by mouth once daily.      meloxicam (MOBIC) 15 MG tablet TAKE ONE TABLET BY MOUTH ONCE DAILY 30 tablet 0    pantoprazole (PROTONIX) 40 mg GrPS Take 40 mg by mouth once daily.      rosuvastatin (CRESTOR)  "20 MG tablet Take 1 tablet (20 mg total) by mouth every evening. 90 tablet 3    trospium (SANCTURA) 20 mg Tab tablet Take 1 tablet (20 mg total) by mouth 2 (two) times daily. 60 tablet 0    acyclovir (ZOVIRAX) 800 MG Tab Take 1 tablet (800 mg total) by mouth 5 (five) times daily. 35 tablet 0     No current facility-administered medications on file prior to visit.      Review of patient's allergies indicates:  No Known Allergies    Review of Systems   Constitutional: Negative for chills and fever.   HENT: Negative for congestion, mouth sores and trouble swallowing.    Eyes: Negative for visual disturbance.   Respiratory: Negative for cough and chest tightness.    Cardiovascular: Negative for chest pain, palpitations and leg swelling.   Gastrointestinal: Negative for abdominal pain, constipation, diarrhea, nausea and vomiting.   Endocrine: Negative for polyuria.   Genitourinary: Negative for difficulty urinating, frequency, hematuria and urgency.   Musculoskeletal: Negative for arthralgias and joint swelling.   Skin: Positive for color change. Negative for rash.   Neurological: Negative for dizziness, tremors and numbness.   Hematological: Does not bruise/bleed easily.   Psychiatric/Behavioral: Negative for decreased concentration. The patient is not nervous/anxious.          Objective:   BP (!) 140/78 (BP Location: Left arm, Patient Position: Sitting, BP Method: Medium (Automatic))   Pulse 74   Ht 5' 4" (1.626 m)   Wt 80 kg (176 lb 5.9 oz)   BMI 30.27 kg/m²      Physical Exam   Constitutional: She is oriented to person, place, and time and well-developed, well-nourished, and in no distress.   HENT:   Head: Normocephalic and atraumatic.   Eyes: EOM are normal. Pupils are equal, round, and reactive to light.   Neck: Normal range of motion. Neck supple.   No carotid or subclavian bruits     Cardiovascular: Normal rate and regular rhythm.    Pulmonary/Chest: Effort normal and breath sounds normal.   Abdominal: " Soft. Bowel sounds are normal. She exhibits no distension. There is no tenderness.       Right Side Rheumatological Exam     Examination finds the shoulder, elbow, wrist, knee, 1st PIP, 1st MCP, 2nd PIP, 2nd MCP, 3rd PIP, 3rd MCP, 4th PIP, 4th MCP, 5th PIP and 5th MCP normal.    Left Side Rheumatological Exam     Examination finds the shoulder, elbow, wrist, knee, 1st PIP, 1st MCP, 2nd PIP, 2nd MCP, 3rd PIP, 3rd MCP, 4th PIP, 4th MCP, 5th PIP and 5th MCP normal.      Lymphadenopathy:     She has no cervical adenopathy.   Neurological: She is alert and oriented to person, place, and time.   Skin: Skin is warm and dry. No rash noted. No erythema. There is pallor.     No nail pitting  ulceration around nail bed on 3rd digit L hand  Bluish discoloration to the 2nd and 3rd digit on L and tender to palpation   Psychiatric: Affect normal.   Musculoskeletal: Normal range of motion. She exhibits tenderness. She exhibits no edema or deformity.   2+ radial pulses b/l  Posterior tibial pulses 1+ on RLE  DP 2+ on LLE           Results for GLADYS OFLEY (MRN [id number]) as of 1/24/2018 16:52   Ref. Range 1/19/2018 11:42   WBC Latest Ref Range: 3.90 - 12.70 K/uL 7.12   RBC Latest Ref Range: 4.00 - 5.40 M/uL 4.40   Hemoglobin Latest Ref Range: 12.0 - 16.0 g/dL 13.5   Hematocrit Latest Ref Range: 37.0 - 48.5 % 39.9   MCV Latest Ref Range: 82 - 98 fL 91   MCH Latest Ref Range: 27.0 - 31.0 pg 30.7   MCHC Latest Ref Range: 32.0 - 36.0 g/dL 33.8   RDW Latest Ref Range: 11.5 - 14.5 % 12.4   Platelets Latest Ref Range: 150 - 350 K/uL 333   MPV Latest Ref Range: 9.2 - 12.9 fL 8.8 (L)   Gran% Latest Ref Range: 38.0 - 73.0 % 68.0   Gran # Latest Ref Range: 1.8 - 7.7 K/uL 4.8   Immature Granulocytes Latest Ref Range: 0.0 - 0.5 % 0.4   Immature Grans (Abs) Latest Ref Range: 0.00 - 0.04 K/uL 0.03   Lymph% Latest Ref Range: 18.0 - 48.0 % 22.8   Lymph # Latest Ref Range: 1.0 - 4.8 K/uL 1.6   Mono% Latest Ref Range: 4.0 - 15.0 % 6.3   Mono #  Latest Ref Range: 0.3 - 1.0 K/uL 0.5   Eosinophil% Latest Ref Range: 0.0 - 8.0 % 2.1   Eos # Latest Ref Range: 0.0 - 0.5 K/uL 0.2   Basophil% Latest Ref Range: 0.0 - 1.9 % 0.4   Baso # Latest Ref Range: 0.00 - 0.20 K/uL 0.03   nRBC Latest Ref Range: 0 /100 WBC 0     Results for GLADYS FOLEY (MRN [id number]) as of 1/24/2018 16:52   Ref. Range 1/19/2018 11:42   Sodium Latest Ref Range: 136 - 145 mmol/L 138   Potassium Latest Ref Range: 3.5 - 5.1 mmol/L 3.6   Chloride Latest Ref Range: 95 - 110 mmol/L 102   CO2 Latest Ref Range: 23 - 29 mmol/L 27   Anion Gap Latest Ref Range: 8 - 16 mmol/L 9   BUN, Bld Latest Ref Range: 8 - 23 mg/dL 15   Creatinine Latest Ref Range: 0.5 - 1.4 mg/dL 0.8   eGFR if non African American Latest Ref Range: >60 mL/min/1.73 m^2 >60.0   eGFR if African American Latest Ref Range: >60 mL/min/1.73 m^2 >60.0   Glucose Latest Ref Range: 70 - 110 mg/dL 102   Calcium Latest Ref Range: 8.7 - 10.5 mg/dL 9.7   Alkaline Phosphatase Latest Ref Range: 55 - 135 U/L 77   Total Protein Latest Ref Range: 6.0 - 8.4 g/dL 7.2   Albumin Latest Ref Range: 3.5 - 5.2 g/dL 3.8   Total Bilirubin Latest Ref Range: 0.1 - 1.0 mg/dL 0.5   AST Latest Ref Range: 10 - 40 U/L 23   ALT Latest Ref Range: 10 - 44 U/L 28       Results for GLADYS OFLEY (MRN [id number]) as of 1/3/2018 17:12   Ref. Range 12/20/2017 17:06   TOYA HEP-2 Titer Unknown Positive 1:1280 C...   Anti-SSA Antibody Latest Ref Range: 0.00 - 19.99 EU 1.06   Anti-SSA Interpretation Latest Ref Range: Negative  Negative   Anti-SSB Antibody Latest Ref Range: 0.00 - 19.99 EU 0.00   Anti-SSB Interpretation Latest Ref Range: Negative  Negative   ds DNA Ab Latest Ref Range: Negative 1:10  Negative 1:10   Anti Sm Antibody Latest Ref Range: 0.00 - 19.99 EU 0.58   Anti-Sm Interpretation Latest Ref Range: Negative  Negative   Anti Sm/RNP Antibody Latest Ref Range: 0.00 - 19.99 EU 0.72   Anti-Sm/RNP Interpretation Latest Ref Range: Negative  Negative   Results for  GLADYS FOLEY (MRN [id number]) as of 1/3/2018 17:12   Ref. Range 12/20/2017 17:06 1/3/2018 13:38   CCP Antibodies Latest Ref Range: <5.0 U/mL  <0.5   Rheumatoid Factor Latest Ref Range: 0.0 - 15.0 IU/mL 12.0    Results for GLADYS FOLEY (MRN [id number]) as of 1/24/2018 16:52   Ref. Range 1/3/2018 13:38   CCP Antibodies Latest Ref Range: <5.0 U/mL <0.5       Results for GLADYS FOLEY (MRN [id number]) as of 1/3/2018 17:12   Ref. Range 12/26/2017 16:14   Complement (C-3) Latest Ref Range: 50 - 180 mg/dL 127   Complement (C-4) Latest Ref Range: 11 - 44 mg/dL 20     Results for GLADYS FOLEY (MRN [id number]) as of 1/24/2018 16:52   Ref. Range 1/3/2018 13:38   Complement,Total, Serum Latest Ref Range: 42 - 95 U/mL 63     Results for GLADYS FOLEY (MRN [id number]) as of 1/24/2018 16:52   Ref. Range 1/3/2018 13:38   Cytoplasmic Neutrophilic Ab Latest Ref Range: <1:20 Titer <1:20   Perinuclear (P-ANCA) Latest Ref Range: <1:20 Titer <1:20   ANCA Proteinase 3 Latest Ref Range: <0.4 (Negative) U <0.2   MPO Latest Ref Range: <=20 UNITS 3     Results for GLADYS FOLEY (MRN [id number]) as of 1/24/2018 16:52   Ref. Range 12/26/2017 16:14   Cryoglobulin, Qualitative Latest Ref Range: Absent  Absent       Results for GLADYS FOLEY (MRN [id number]) as of 1/24/2018 16:52   Ref. Range 1/3/2018 13:16   Specimen UA Unknown Urine, Unspecified   Color, UA Latest Ref Range: Yellow, Straw, Litzy  Straw   pH, UA Latest Ref Range: 5.0 - 8.0  7.0   Specific Gravity, UA Latest Ref Range: 1.005 - 1.030  1.010   Appearance, UA Latest Ref Range: Clear  Clear   Protein, UA Latest Ref Range: Negative  Negative   Glucose, UA Latest Ref Range: Negative  Negative   Ketones, UA Latest Ref Range: Negative  Negative   Occult Blood UA Latest Ref Range: Negative  Negative   Nitrite, UA Latest Ref Range: Negative  Negative   Urobilinogen, UA Latest Ref Range: <2.0 EU/dL Negative   Bilirubin (UA) Latest Ref Range: Negative  Negative   Leukocytes,  UA Latest Ref Range: Negative  Negative   Prot/Creat Ratio, Ur Latest Ref Range: 0.00 - 0.20  Unable to calculate   Protein, Urine Random Latest Ref Range: 0 - 15 mg/dL <7         Results for GLADYS FOLEY (MRN [id number]) as of 1/24/2018 16:52   Ref. Range 1/3/2018 13:16   Benzodiazepines Unknown Negative   Methadone metabolites Unknown Negative   Phencyclidine Unknown Negative   Cocaine (Metab.) Unknown Negative   Opiate Scrn, Ur Unknown Negative   Barbiturate Screen, Ur Unknown Negative   Amphetamine Screen, Ur Unknown Negative   Marijuana (THC) Metabolite Unknown Negative       SPEP  Normal total protein, normal pattern.                               Previous clinic visit pictures             Previous R 2nd digit prior to amputation      US L arm 01/08/17  Color flow duplex reveals a patent left upper extremity arterial tree with no evidence of a hemodynamically significant stenosis.    CTA chest 01/10/18  No evidence of vascular occlusion to the level of the wrist. Recommend referral to interventional radiology for angiogram if there is concern for patency of the distal vessels of the hand.  Mild calcific atherosclerosis of the subclavian artery origin.  Right upper lobe groundglass consolidation possibly infectious or inflammatory      CTA chest 08/17  Examination of the soft tissue and vascular structures at the base of the neck is unremarkable.  There is a left-sided aortic arch with 3 branch vessels.  The thoracic aorta maintains normal caliber, contour, and course with mild atherosclerotic calcification.  There is no evidence of aneurysmal dilation or dissection.    The pulmonary arteries distribute normally without evidence of filling defect to indicate pulmonary thromboembolism.      The heart is not enlarged.  No pericardial effusion.    There is no axillary, mediastinal, or hilar lymph node enlargement.      The esophagus maintains a normal course and caliber.  There is a small hiatal  hernia.    Limited images of the upper abdomen obtained during the course of this dedicated thoracic CT demonstrate nothing unusual.  Incidental note is made of splenules.    The osseous structures demonstrate age-related degenerative change.    The trachea and proximal airways are patent.    The lungs are symmetrically expanded with limited pulmonary parenchymal evaluation secondary to motion artifact on this study with technique optimized for pulmonary arterial evaluation.  Bibasilar dependent atelectasis. No evidence of consolidation, pneumothorax, mass, or significant pleural thickening.  There is a 0.8 cm nodular opacity abutting the pleura in the apicoposterior segment of the LEFT upper lobe with central calcification likely representing granulomatous disease.  Additionally, there are few calcified pulmonary micronodules bilaterally.  There is no evidence of pleural fluid.    Xray Knee 12/20/17  Right: There is a TKA in place alignment no complication  Left: There is a TKA in place with good alignment and no complication    2D ECHO 09/17  CONCLUSIONS     1 - Normal left ventricular systolic function (EF 60-65%).     2 - Normal right ventricular systolic function .     3 - Normal left ventricular diastolic function.     4 - Mild tricuspid regurgitation.     5 - The estimated PA systolic pressure is 29 mmHg.     6 - No evidence of intracardiac shunt  Assessment:       1. Altered tissue perfusion    2. TOYA positive    3. Atheroembolism of right upper extremity    4. Chronic gastric ulcer without hemorrhage and without perforation    5. Former smoker: 1 pack a day for 30 years, quit 3/11/10    6. Benign hypertension            Plan:         Shanda was seen today for disease management.    Diagnoses and all orders for this visit:    Altered tissue perfusion  All rheumatology workup negative for cause of digit ischemia.  Low suspicion for autoimmune cause for pt's symptoms.  HIV, Hep, Quantiferon gold TB neg, RPR  neg, ANCA, MPO,PR3 neg, Utox is negative. No BP asymmetry, minimally elevated inflammatory marker ESR 22 and normal CRP, normal albumin makes chronic inflammatory process less likely. 2D ECHO shows EF 60-65%, no thrombi/vegetations, no effusions or shunts.US of L arm done 01/18 showed a patent left upper extremity arterial tree with no evidence of a hemodynamically significant stenosis. CTA L arm 01/18 shows no evidence of vascular occlusion to the level of the wrist. Pt was seen by Vascular surgery who thinks symptoms likely related to cholesterol emboli and no surgical intervention needed.  No indication for steroids based on workup. Pt reported some improvement in her fingers when she received anticoagulation (heparin) while in the ED 01/19/17, pt states that she is willingly to try anything at this point. Will recommend trial of full dose lovenox for about 2 weeks and discuss with PCP to initiate this regimen and follow up with Vascular surgery.       TOYA positive  TOYA 1:1280 centromere with negative profile. Normal complements. APS workup negative. CBC shows no cytopenias or eosinophilia. metabolic profile shows normal liver and renal function. ESR 22, CRP 0.9. SPEP wnl. Hypercoagulable workup negative. Pt does not meet SLICC criteria for SLE based on symptoms and labs.  No evidence of synovitis on exam, RF, ACPA neg. Low suspicion for inflammatory arthritis.   CTA chest showing mild atherosclerosis,nodular opacity 0.8cm pleura KSENIA with no evidence of aortic aneurysmal dilation or dissection, no LAD.   Family history of autoimmune disease (brother with RA and niece with SLE) could explain + TOYA.     Atheroembolism of right upper extremity  Mild atherosclerosis subclavian on CTA L arm noted.   Continue ASA and statin  F/u Vascular surgery      Benign hypertension  BP elevated. Pt was switched from Metoprolol to amlodipine on last clinic visit  Continue amlodipine  F/u PCP        Keep scheduled appt.

## 2018-01-24 NOTE — TELEPHONE ENCOUNTER
Pt was called regarding compliant of worsening digits.  Setup appt for f/u today @ 2.00pm. Pt voiced understanding.

## 2018-01-25 ENCOUNTER — OFFICE VISIT (OUTPATIENT)
Dept: INTERNAL MEDICINE | Facility: CLINIC | Age: 63
End: 2018-01-25
Payer: COMMERCIAL

## 2018-01-25 VITALS
HEART RATE: 75 BPM | HEIGHT: 64 IN | DIASTOLIC BLOOD PRESSURE: 70 MMHG | OXYGEN SATURATION: 97 % | SYSTOLIC BLOOD PRESSURE: 136 MMHG | BODY MASS INDEX: 31.27 KG/M2 | WEIGHT: 183.19 LBS

## 2018-01-25 DIAGNOSIS — F32.A DEPRESSION, UNSPECIFIED DEPRESSION TYPE: ICD-10-CM

## 2018-01-25 DIAGNOSIS — I75.011 ATHEROEMBOLISM OF RIGHT UPPER EXTREMITY: ICD-10-CM

## 2018-01-25 DIAGNOSIS — I96 NECROSIS OF FINGER: Primary | ICD-10-CM

## 2018-01-25 DIAGNOSIS — R09.89 ALTERED TISSUE PERFUSION: ICD-10-CM

## 2018-01-25 PROCEDURE — 99214 OFFICE O/P EST MOD 30 MIN: CPT | Mod: S$GLB,,, | Performed by: INTERNAL MEDICINE

## 2018-01-25 PROCEDURE — 99999 PR PBB SHADOW E&M-EST. PATIENT-LVL III: CPT | Mod: PBBFAC,,, | Performed by: INTERNAL MEDICINE

## 2018-01-25 RX ORDER — ENOXAPARIN SODIUM 100 MG/ML
80 INJECTION SUBCUTANEOUS 2 TIMES DAILY
Qty: 60 SYRINGE | Refills: 1 | Status: SHIPPED | OUTPATIENT
Start: 2018-01-25 | End: 2018-03-05 | Stop reason: SDUPTHER

## 2018-01-25 RX ORDER — SERTRALINE HYDROCHLORIDE 50 MG/1
50 TABLET, FILM COATED ORAL DAILY
Qty: 30 TABLET | Refills: 11 | Status: SHIPPED | OUTPATIENT
Start: 2018-01-25 | End: 2018-03-29 | Stop reason: ALTCHOICE

## 2018-01-25 RX ORDER — SILDENAFIL 50 MG/1
50 TABLET, FILM COATED ORAL DAILY
Qty: 30 TABLET | Refills: 1 | Status: SHIPPED | OUTPATIENT
Start: 2018-01-25 | End: 2018-01-29 | Stop reason: CLARIF

## 2018-01-25 RX ORDER — TRAMADOL HYDROCHLORIDE 50 MG/1
TABLET ORAL
COMMUNITY
Start: 2018-01-24 | End: 2018-01-29 | Stop reason: SDUPTHER

## 2018-01-25 NOTE — PROGRESS NOTES
I have personally taken the history and examined the patient to verify the fellow's notation, and I agree with the impression and plan stated.   As noted, we have been unable to identify an inflammatory disorder to account for her digital ischemic lesions.   L finger lesion has increased in size. Non invasive vascular studies and CT angio did not show a culprit lesion  Evidence for anticoagulation for arterial thromboembolism is poor, but would consider a trial of Lovenox in conjunction with ASA and vasodilating anti-hypertensive amolipine.  Martín Reaves MD  Rheumatology  Mobile: [phone]

## 2018-01-25 NOTE — PROGRESS NOTES
"Subjective:      Patient ID: Shanda Shaffer is a 62 y.o. female.    Chief Complaint: No chief complaint on file.    HPI: 62 y.o. White female, depressed, scared, crying.  She did indeed go to the ER as asked, was begun on a heparin drip, and evaluated by  Vascular surgery, who did not think anything more could be done, and discharged pt.  She then went to see her hand surgeon, who stated that she was at risk of loosing her  Left,distant index phalange.  She also went to see Rheumatology, who reviewed extensively her chart,and labs, and  Examined her, ultimately suggesting that she be placed on high dose lovenox to try to  Help her finger.  So, pt comes to me.  States she is taking her Crestor, and 162mg of ASA a day, anti hypertensives, but not the  Motrin, Nifedipine. Takes Tramadol for the pain.        Review of Systems   Constitutional: Negative.    HENT: Negative.  Negative for nosebleeds.    Eyes: Negative.    Respiratory: Negative.    Cardiovascular: Negative.    Gastrointestinal: Negative.    Endocrine: Negative.    Genitourinary: Negative for hematuria and vaginal bleeding.   Musculoskeletal:        Left index finger hurts.   Allergic/Immunologic: Negative.    Neurological: Negative.    Hematological: Negative.    Psychiatric/Behavioral: Negative.        Objective:   /70 (BP Location: Right arm, Patient Position: Sitting, BP Method: Large (Manual))   Pulse 75   Ht 5' 4" (1.626 m)   Wt 83.1 kg (183 lb 3.2 oz)   SpO2 97%   BMI 31.45 kg/m²     Physical Exam   Constitutional: She is oriented to person, place, and time. She appears well-developed and well-nourished.   HENT:   Head: Normocephalic and atraumatic.   Nose: Nose normal.   Mouth/Throat: Oropharynx is clear and moist.   Eyes: Conjunctivae are normal. Pupils are equal, round, and reactive to light.   Neck: Normal range of motion. Neck supple. No JVD present.   Cardiovascular: Normal rate, regular rhythm and normal heart sounds.  "   Pulmonary/Chest: Effort normal and breath sounds normal.   Abdominal: Soft. Bowel sounds are normal.   Musculoskeletal: Normal range of motion. She exhibits no edema.   Neurological: She is alert and oriented to person, place, and time.   Skin:   Necrotic, distal tuft of left index finger, trying to  Mummify. Entire phalanx dusky,cool.   Psychiatric: She has a normal mood and affect. Her behavior is normal.   Nursing note and vitals reviewed.      Assessment:     1. Necrosis of finger    2. Altered tissue perfusion    3. Atheroembolism of right upper extremity    4. Depression, unspecified depression type    Patient understands this is a very uncertain course, with a high probability of loosing part of her digit.  She also understands the danger of this anticoagulation, and attempt to vasodilate with sildenafil.  Explained possibilities of stroke,intracranial,intra optic,GI,lung,heart,kidney, etc bleed and even death.  She agrees and understands.  Also discussed with Dr. James, who will try to get in touch with Vascular Surgery for DENISE.  Rheumatologic work up NEGATIVE.  Plan:     Necrosis of finger  -     Protime-INR; Future; Expected date: 01/25/2018  -     Sedimentation rate, manual; Future; Expected date: 01/25/2018  -     Protime-INR; Future; Expected date: 01/25/2018  -     enoxaparin (LOVENOX) 80 mg/0.8 mL Syrg; Inject 0.8 mLs (80 mg total) into the skin 2 (two) times daily.  Dispense: 60 Syringe; Refill: 1  -     sildenafil (VIAGRA) 50 MG tablet; Take 1 tablet (50 mg total) by mouth once daily.  Dispense: 30 tablet; Refill: 1    Altered tissue perfusion  -     Protime-INR; Future; Expected date: 01/25/2018  -     enoxaparin (LOVENOX) 80 mg/0.8 mL Syrg; Inject 0.8 mLs (80 mg total) into the skin 2 (two) times daily.  Dispense: 60 Syringe; Refill: 1  -     sildenafil (VIAGRA) 50 MG tablet; Take 1 tablet (50 mg total) by mouth once daily.  Dispense: 30 tablet; Refill: 1    Atheroembolism of right upper  extremity  -     Sedimentation rate, manual; Future; Expected date: 01/25/2018  -     Protime-INR; Future; Expected date: 01/25/2018    Depression, unspecified depression type  -     sertraline (ZOLOFT) 50 MG tablet; Take 1 tablet (50 mg total) by mouth once daily.  Dispense: 30 tablet; Refill: 11

## 2018-01-26 ENCOUNTER — TELEPHONE (OUTPATIENT)
Dept: INTERNAL MEDICINE | Facility: CLINIC | Age: 63
End: 2018-01-26

## 2018-01-26 ENCOUNTER — HOSPITAL ENCOUNTER (OUTPATIENT)
Dept: RADIOLOGY | Facility: HOSPITAL | Age: 63
Discharge: HOME | End: 2018-01-26
Attending: INTERNAL MEDICINE
Payer: COMMERCIAL

## 2018-01-26 ENCOUNTER — TELEPHONE (OUTPATIENT)
Dept: RHEUMATOLOGY | Facility: CLINIC | Age: 63
End: 2018-01-26

## 2018-01-26 DIAGNOSIS — J18.1 RIGHT UPPER LOBE CONSOLIDATION: ICD-10-CM

## 2018-01-26 DIAGNOSIS — J18.1 RIGHT UPPER LOBE CONSOLIDATION: Primary | ICD-10-CM

## 2018-01-26 DIAGNOSIS — Z87.891 FORMER SMOKER: ICD-10-CM

## 2018-01-26 DIAGNOSIS — R09.89 ALTERED TISSUE PERFUSION: Primary | ICD-10-CM

## 2018-01-26 PROCEDURE — 71046 X-RAY EXAM CHEST 2 VIEWS: CPT | Mod: 26,,, | Performed by: RADIOLOGY

## 2018-01-26 PROCEDURE — 71046 X-RAY EXAM CHEST 2 VIEWS: CPT | Mod: TC,FY

## 2018-01-26 NOTE — TELEPHONE ENCOUNTER
Pt called regarding CXR results.Will order CT chest for further evaluation and to keep appt with Pulmonary.    Pt voiced understanding

## 2018-01-26 NOTE — TELEPHONE ENCOUNTER
----- Message from Anastasiya Cummings sent at 1/26/2018  8:17 AM CST -----  Contact: self/[phone]  The pharmacy in not filling the sildenafil (VIAGRA) 50 MG tablet; it requires a PA; the traMADol (ULTRAM) 50 mg tablet is not helping with the pain; can you call in something stronger?

## 2018-01-26 NOTE — TELEPHONE ENCOUNTER
Pt called regarding RUL consolidation noted on CTA of L arm. Pt denies fever, chills, cough or sick contacts.     Will obtain blood/fungal cultures despite normal inflammatory markers and no evidence of leucocytosis/ L shift with CXR and refer to pulmonary for further evaluation. Unsure if this could be related to pt's current symptoms.     Pt advised to get labs and CXR done today which she voiced understanding.

## 2018-01-26 NOTE — TELEPHONE ENCOUNTER
Spoke to pt she is having trouble getting the Viagra. She was told if she gets VIAGRA 20 mg sent to [facility] in [place]. She can get either 60 or 90 pills with a coupon. It will just depend on how many mg you want her to take to determine how many to dispense.

## 2018-01-26 NOTE — TELEPHONE ENCOUNTER
----- Message from Hector Key sent at 1/26/2018  4:31 PM CST -----  Contact: [phone]/self  Pt requesting to speak with you concerning her prescriptions.  Please call and advise

## 2018-01-26 NOTE — TELEPHONE ENCOUNTER
Spoke with patient and informed her that she can take 2 tabs of tramadol and to keep her appt on Monday. Explained to her that we are waiting on the prior auth on the viagra. Patient verbalized understanding      Patient would like you to look at her CTA and chest x-ray. They called her to get more labs done.

## 2018-01-26 NOTE — TELEPHONE ENCOUNTER
----- Message from Yamileth Cotter sent at 1/26/2018  1:57 PM CST -----  Contact: [phone]/self  Patient left a message this morning and is calling to follow up. Please advise.

## 2018-01-29 ENCOUNTER — OFFICE VISIT (OUTPATIENT)
Dept: INTERNAL MEDICINE | Facility: CLINIC | Age: 63
End: 2018-01-29
Payer: COMMERCIAL

## 2018-01-29 ENCOUNTER — TELEPHONE (OUTPATIENT)
Dept: INTERNAL MEDICINE | Facility: CLINIC | Age: 63
End: 2018-01-29

## 2018-01-29 VITALS
HEIGHT: 64 IN | WEIGHT: 182.81 LBS | DIASTOLIC BLOOD PRESSURE: 82 MMHG | SYSTOLIC BLOOD PRESSURE: 120 MMHG | HEART RATE: 74 BPM | OXYGEN SATURATION: 97 % | BODY MASS INDEX: 31.21 KG/M2

## 2018-01-29 DIAGNOSIS — R09.89 ALTERED TISSUE PERFUSION: ICD-10-CM

## 2018-01-29 DIAGNOSIS — I96 NECROSIS OF FINGER: ICD-10-CM

## 2018-01-29 DIAGNOSIS — I75.011 ATHEROEMBOLISM OF RIGHT UPPER EXTREMITY: Primary | ICD-10-CM

## 2018-01-29 PROCEDURE — 99999 PR PBB SHADOW E&M-EST. PATIENT-LVL III: CPT | Mod: PBBFAC,,, | Performed by: INTERNAL MEDICINE

## 2018-01-29 PROCEDURE — 99213 OFFICE O/P EST LOW 20 MIN: CPT | Mod: S$GLB,,, | Performed by: INTERNAL MEDICINE

## 2018-01-29 RX ORDER — TRAMADOL HYDROCHLORIDE 50 MG/1
50 TABLET ORAL EVERY 6 HOURS PRN
Qty: 60 TABLET | Refills: 1 | Status: SHIPPED | OUTPATIENT
Start: 2018-01-29 | End: 2018-03-02 | Stop reason: SDUPTHER

## 2018-01-29 RX ORDER — SILDENAFIL CITRATE 20 MG/1
20 TABLET ORAL 2 TIMES DAILY WITH MEALS
Qty: 60 TABLET | Refills: 11 | Status: SHIPPED | OUTPATIENT
Start: 2018-01-29 | End: 2018-05-30 | Stop reason: ALTCHOICE

## 2018-01-29 NOTE — PROGRESS NOTES
"    Subjective:      Patient ID: Shanda Shaffer is a 62 y.o. female.    Chief Complaint: Follow-up    HPI: 62 y.o. White female, here in follow up , for necrosis of her left index finger.  She has been able to use the Lovenox, albeit with help due to the amputation of  The tip of her right finger.   She was not able to get the sildenafil for vascular indication yet.  No chest pain, but she thinks the finger is warmer, however using the Tramadol every 4 hrs.  Zoloft seems to be helping.    Review of Systems   Respiratory: Negative for cough, choking, chest tightness, shortness of breath and wheezing.    Cardiovascular: Negative for chest pain, palpitations and leg swelling.   All other systems reviewed and are negative.      Objective:   /82   Pulse 74   Ht 5' 4" (1.626 m)   Wt 82.9 kg (182 lb 12.8 oz)   SpO2 97%   BMI 31.38 kg/m²     Physical Exam   Constitutional: She appears well-developed and well-nourished.   HENT:   Head: Normocephalic and atraumatic.   Eyes: Conjunctivae and EOM are normal.   Neck: Neck supple.   Abdominal:   Bruising from lovenox   Neurological: She is alert.   Skin: Skin is warm.   Nursing note and vitals reviewed.      Assessment:     1. Atheroembolism of right upper extremity    2. Necrosis of finger    3. Altered tissue perfusion      Plan:     Atheroembolism of right upper extremity  -     sildenafil, antihypertensive, (REVATIO) 20 mg Tab; Take 1 tablet (20 mg total) by mouth 2 (two) times daily with meals.  Dispense: 60 tablet; Refill: 11  -     traMADol (ULTRAM) 50 mg tablet; Take 1 tablet (50 mg total) by mouth every 6 (six) hours as needed for Pain.  Dispense: 60 tablet; Refill: 1    Necrosis of finger  -     sildenafil, antihypertensive, (REVATIO) 20 mg Tab; Take 1 tablet (20 mg total) by mouth 2 (two) times daily with meals.  Dispense: 60 tablet; Refill: 11  -     traMADol (ULTRAM) 50 mg tablet; Take 1 tablet (50 mg total) by mouth every 6 (six) hours as needed for " Pain.  Dispense: 60 tablet; Refill: 1    Altered tissue perfusion  -     sildenafil, antihypertensive, (REVATIO) 20 mg Tab; Take 1 tablet (20 mg total) by mouth 2 (two) times daily with meals.  Dispense: 60 tablet; Refill: 11  -     traMADol (ULTRAM) 50 mg tablet; Take 1 tablet (50 mg total) by mouth every 6 (six) hours as needed for Pain.  Dispense: 60 tablet; Refill: 1    I spoke with the insurance company to get the vascular dosing approved.  Continue lovenox.

## 2018-01-30 ENCOUNTER — PATIENT MESSAGE (OUTPATIENT)
Dept: RHEUMATOLOGY | Facility: CLINIC | Age: 63
End: 2018-01-30

## 2018-01-31 ENCOUNTER — OFFICE VISIT (OUTPATIENT)
Dept: VASCULAR SURGERY | Facility: CLINIC | Age: 63
End: 2018-01-31
Payer: COMMERCIAL

## 2018-01-31 VITALS
WEIGHT: 182.75 LBS | BODY MASS INDEX: 31.2 KG/M2 | DIASTOLIC BLOOD PRESSURE: 81 MMHG | TEMPERATURE: 99 F | HEART RATE: 83 BPM | SYSTOLIC BLOOD PRESSURE: 157 MMHG | HEIGHT: 64 IN

## 2018-01-31 DIAGNOSIS — R76.8 ANA POSITIVE: Primary | ICD-10-CM

## 2018-01-31 PROCEDURE — 99999 PR PBB SHADOW E&M-EST. PATIENT-LVL III: CPT | Mod: PBBFAC,,, | Performed by: SURGERY

## 2018-01-31 PROCEDURE — 3008F BODY MASS INDEX DOCD: CPT | Mod: S$GLB,,, | Performed by: SURGERY

## 2018-01-31 PROCEDURE — 99214 OFFICE O/P EST MOD 30 MIN: CPT | Mod: S$GLB,,, | Performed by: SURGERY

## 2018-01-31 NOTE — PROGRESS NOTES
Shanda Shaffer  2018  Referred by: Dr. Cheema at Urgent Care  HPI:   Patient is a 62 y.o. female with HTN and HLD who is here today for f/u of right 2nd finger discoloration - began in Aug 2017 --- underwent R 2nd digit tip amputation.  In Aug 2017, she went to urgent care yesterday and was told to take ASA and take pain meds as needed and come to vascular appointment today.  She is not taking a statin.  She denies history of autoimmune problems.  She had bilateral TKA 18 months ago.    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[employer]    PMD is Dr Cintia Villalta ()  PMD is Dr Ad Bassett    Past Medical History:   Diagnosis Date    Acne vulgaris 2015    Benign hypertension 2015    Bladder prolapse, female, acquired 2015    Diverticulosis of large intestine without hemorrhage 2015    Esophageal stricture 2015    Former smoker: 1 pack a day for 30 years, quit 3/11/10 3/17/2017    Gastroesophageal reflux disease without esophagitis 2015    Hyperlipidemia     Non morbid obesity due to excess calories 2015    Primary osteoarthritis of both knees 2015     Past Surgical History:   Procedure Laterality Date    bilateral knee surgery  2016    BREAST SURGERY      cyst removal     SECTION      x 4    HAND SURGERY      JOINT REPLACEMENT      KNEE SURGERY Bilateral 2016    TK    TONSILLECTOMY       Family History   Problem Relation Age of Onset    Diabetes Mother     Heart disease Mother     Arthritis Mother     Hypertension Father     Heart disease Father      PPM    Heart attack Father     Diabetes Sister     Heart disease Sister      valve replacement    Diabetes  Brother     Arthritis Brother      RA    No Known Problems Son     Cancer Maternal Grandmother      breast    Stroke Maternal Grandmother     No Known Problems Son     No Known Problems Son     No Known Problems Son     Heart failure Neg Hx     Hyperlipidemia Neg Hx      Social History     Social History    Marital status:      Spouse name: N/A    Number of children: 4    Years of education: N/A     Occupational History    Not on file.     Social History Main Topics    Smoking status: Former Smoker     Packs/day: 1.00     Years: 30.00     Types: Cigarettes     Quit date: 3/11/2010    Smokeless tobacco: Never Used    Alcohol use Yes      Comment: occasional    Drug use: No    Sexual activity: Not Currently     Other Topics Concern    Not on file     Social History Narrative    No narrative on file     Current Outpatient Prescriptions on File Prior to Visit   Medication Sig    amLODIPine (NORVASC) 10 MG tablet Take 1 tablet (10 mg total) by mouth once daily.    aspirin (ECOTRIN) 81 MG EC tablet Take 2 tablets (162 mg total) by mouth once daily.    diclofenac sodium 1 % Gel Apply 2 g topically once daily.    enoxaparin (LOVENOX) 80 mg/0.8 mL Syrg Inject 0.8 mLs (80 mg total) into the skin 2 (two) times daily.    estradiol (ESTRING) 2 mg (7.5 mcg /24 hour) vaginal ring Place 2 mg vaginally every 3 (three) months.    losartan-hydrochlorothiazide 100-25 mg (HYZAAR) 100-25 mg per tablet Take 1 tablet by mouth once daily.    pantoprazole (PROTONIX) 40 mg GrPS Take 40 mg by mouth once daily.    rosuvastatin (CRESTOR) 20 MG tablet Take 1 tablet (20 mg total) by mouth every evening.    sertraline (ZOLOFT) 50 MG tablet Take 1 tablet (50 mg total) by mouth once daily.    sildenafil, antihypertensive, (REVATIO) 20 mg Tab Take 1 tablet (20 mg total) by mouth 2 (two) times daily with meals.    traMADol (ULTRAM) 50 mg tablet Take 1 tablet (50 mg total) by mouth every 6 (six) hours as needed for  Pain.    trospium (SANCTURA) 20 mg Tab tablet Take 1 tablet (20 mg total) by mouth 2 (two) times daily.     No current facility-administered medications on file prior to visit.        REVIEW OF SYSTEMS:  General: negative; ENT: negative; Allergy and Immunology: negative; Hematological and Lymphatic: Negative; Endocrine: negative; Respiratory: no cough, shortness of breath, or wheezing; Cardiovascular: no chest pain or dyspnea on exertion; Gastrointestinal: no abdominal pain/back, change in bowel habits, or bloody stools; Genito-Urinary: no dysuria, trouble voiding, or hematuria; Musculoskeletal: negative  Neurological: no TIA or stroke symptoms    PHYSICAL EXAM:   Right Arm BP - Sittin/81 (18)  Left Arm BP - Sittin/78 (18)  Pulse: 83  Temp: 98.6 °F (37 °C)      General appearance:  Alert, well-appearing, and in no distress.  Oriented to person, place, and time   Neurological: Normal speech, no focal findings noted; CN II - XII grossly intact           Musculoskeletal: Digits/nail without cyanosis/clubbing.  Normal muscle strength/tone.                 Neck: Supple, no significant adenopathy; thyroid is not enlarged                  No carotid bruit can be auscultated                Chest:  Clear to auscultation, no wheezes, rales or rhonchi, symmetric air entry     No use of accessory muscles             Cardiac: Normal rate and regular rhythm, S1 and S2 normal; PMI non-displaced          Abdomen: Soft, nontender, nondistended, no masses or organomegaly     No rebound tenderness noted; bowel sounds normal     No Palpable Pulsatile aortic mass.      No groin adenopathy      Extremities:  2+ radial, ulnar pulses bilaterally     Right 2nd digit - healing distal phalanx amputation     L 2nd digit - distal phalanx w cyanosis       2+ femoral pulses bilaterally      2+ pedal pulses palpable.     No pedal edema     No ulcerations    LAB RESULTS:  Lab Results   Component Value Date    K  3.6 01/19/2018    K 3.8 12/26/2017    K 4.1 03/17/2017    CREATININE 0.8 01/19/2018    CREATININE 0.9 12/26/2017    CREATININE 0.7 03/17/2017     Lab Results   Component Value Date    WBC 7.12 01/19/2018    WBC 8.38 12/26/2017    WBC 9.17 03/17/2017    HCT 39.9 01/19/2018    HCT 39.5 12/26/2017    HCT 39.9 03/17/2017     01/19/2018     12/26/2017     03/17/2017     No results found for: HGBA1C  IMAGING:  CTA chest:  no arch lesions; no innominate, L subclavian or axillary artery lesion    Right Finger PPGs 8/14/17  Right: All digital PPG waveforms within normal limits except for the 2nd digit.  Abnormal flat wave PPG waveforms of the 2nd digit noted.     TTE: EF 65%, no valvular vegetations; normal bubble study    IMP/PLAN:  62 y.o. 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  CTA chest: no inflow lesions; 2+ L radial pulse w negative L hand Hans's test  Continue ASA 81 po bid; statin rx  Hypercoagulable w/u is negative and this is unlikely given she is 63 yo and has never had an embolic event before    Normal perfusion to hands x2; has 2+ radial and ulnar arteries x2  Had + TOYA, though other markers have been negative, Rheum w/u has been otherwise non-reavealing    Has started LMWH trial by rheum  Sildenafil rx is fine  Unfortunately, there is no arterial revascularization I can offer her to improve the symptoms.  F/u w Hand surgeon re: L 2nd digit, would encourage to demarcate L 2nd tip   Stable symptoms for now    Jayce Araya MD FACS  Vascular/Endovascular Surgery

## 2018-02-02 ENCOUNTER — HOSPITAL ENCOUNTER (OUTPATIENT)
Dept: RADIOLOGY | Facility: HOSPITAL | Age: 63
Discharge: HOME | End: 2018-02-02
Attending: INTERNAL MEDICINE
Payer: COMMERCIAL

## 2018-02-02 DIAGNOSIS — J18.1 RIGHT UPPER LOBE CONSOLIDATION: ICD-10-CM

## 2018-02-02 DIAGNOSIS — R09.89 ALTERED TISSUE PERFUSION: ICD-10-CM

## 2018-02-02 DIAGNOSIS — Z87.891 FORMER SMOKER: ICD-10-CM

## 2018-02-02 PROCEDURE — 71250 CT THORAX DX C-: CPT | Mod: 26,,, | Performed by: RADIOLOGY

## 2018-02-02 PROCEDURE — 71250 CT THORAX DX C-: CPT | Mod: TC

## 2018-02-05 ENCOUNTER — OFFICE VISIT (OUTPATIENT)
Dept: INTERNAL MEDICINE | Facility: CLINIC | Age: 63
End: 2018-02-05
Payer: COMMERCIAL

## 2018-02-05 VITALS
SYSTOLIC BLOOD PRESSURE: 126 MMHG | HEIGHT: 64 IN | OXYGEN SATURATION: 98 % | HEART RATE: 77 BPM | WEIGHT: 183.63 LBS | BODY MASS INDEX: 31.35 KG/M2 | DIASTOLIC BLOOD PRESSURE: 60 MMHG

## 2018-02-05 DIAGNOSIS — M79.645 PAIN OF FINGER OF LEFT HAND: ICD-10-CM

## 2018-02-05 DIAGNOSIS — I75.012: Primary | ICD-10-CM

## 2018-02-05 PROCEDURE — 99999 PR PBB SHADOW E&M-EST. PATIENT-LVL III: CPT | Mod: PBBFAC,,, | Performed by: INTERNAL MEDICINE

## 2018-02-05 PROCEDURE — 3008F BODY MASS INDEX DOCD: CPT | Mod: S$GLB,,, | Performed by: INTERNAL MEDICINE

## 2018-02-05 PROCEDURE — 99213 OFFICE O/P EST LOW 20 MIN: CPT | Mod: S$GLB,,, | Performed by: INTERNAL MEDICINE

## 2018-02-05 RX ORDER — PANTOPRAZOLE SODIUM 40 MG/1
TABLET, DELAYED RELEASE ORAL
COMMUNITY
Start: 2018-02-02 | End: 2018-02-05 | Stop reason: SDUPTHER

## 2018-02-05 NOTE — PROGRESS NOTES
"Subjective:      Patient ID: Shanda Shaffer is a 62 y.o. female.    Chief Complaint: Follow-up    HPI: 62 y.o. White female , here in follow up for her atheromatous emboli to tip of left index finger.  It is very tender, and hurts.  Giving herself her LMW heparin as ordered. Taking the sildenafil also as ordered. No untoward    Medicine effects.  No bleeding.      Review of Systems   All other systems reviewed and are negative.      Objective:   /60   Pulse 77   Ht 5' 4" (1.626 m)   Wt 83.3 kg (183 lb 9.6 oz)   SpO2 98%   BMI 31.51 kg/m²     Physical Exam   Constitutional: She is oriented to person, place, and time. She appears well-developed and well-nourished.   HENT:   Head: Normocephalic and atraumatic.   Right Ear: External ear normal.   Left Ear: External ear normal.   Nose: Nose normal.   Mouth/Throat: Oropharynx is clear and moist.   Eyes: Conjunctivae are normal.   Neck: Normal range of motion.   Cardiovascular: Normal rate, regular rhythm and normal heart sounds.    Pulmonary/Chest: Effort normal and breath sounds normal.   Neurological: She is alert and oriented to person, place, and time.   Skin: Skin is warm and dry.   Tip of index finger on left demarkating.  No fluctulance.  No mummification so far.  Rest of finger warm.   Psychiatric: She has a normal mood and affect. Her behavior is normal.   Nursing note and vitals reviewed.      Assessment:     1. Atheroembolism of finger, left    2. Pain of finger of left hand    Seems to be stabilizing. No further worsening.  Plan:     Atheroembolism of finger, left    Pain of finger of left hand    Continue present regimen.  See hand surgeon.    "

## 2018-02-06 ENCOUNTER — TELEPHONE (OUTPATIENT)
Dept: INTERNAL MEDICINE | Facility: CLINIC | Age: 63
End: 2018-02-06

## 2018-02-06 NOTE — TELEPHONE ENCOUNTER
Spoke with patient and she states that she needs a PA on her tramadol 50mg. She paid for it out of her pocket over the weekend because she needed it. Number to call for prior auth is [phone]. Explained to patient that we will start this auth for her.

## 2018-02-06 NOTE — TELEPHONE ENCOUNTER
----- Message from Anastasiya Cummings sent at 2/5/2018  4:13 PM CST -----  Contact: self/[phone]  She needs a PA on the traMADol (ULTRAM) 50 mg tablet.

## 2018-02-09 NOTE — TELEPHONE ENCOUNTER
Spoke with [facility] and they informed me that the patient paid cash and can not see the rejection notes. Will call insurance to find out the reason it needs a PA

## 2018-02-12 ENCOUNTER — OFFICE VISIT (OUTPATIENT)
Dept: INTERNAL MEDICINE | Facility: CLINIC | Age: 63
End: 2018-02-12
Payer: COMMERCIAL

## 2018-02-12 VITALS
BODY MASS INDEX: 30.86 KG/M2 | SYSTOLIC BLOOD PRESSURE: 118 MMHG | HEART RATE: 96 BPM | DIASTOLIC BLOOD PRESSURE: 60 MMHG | WEIGHT: 180.75 LBS | RESPIRATION RATE: 16 BRPM | OXYGEN SATURATION: 98 % | HEIGHT: 64 IN

## 2018-02-12 DIAGNOSIS — I75.012: ICD-10-CM

## 2018-02-12 DIAGNOSIS — Z79.01 ANTICOAGULATION MONITORING, SPECIAL RANGE: Primary | ICD-10-CM

## 2018-02-12 PROCEDURE — 99213 OFFICE O/P EST LOW 20 MIN: CPT | Mod: S$GLB,,, | Performed by: INTERNAL MEDICINE

## 2018-02-12 PROCEDURE — 3008F BODY MASS INDEX DOCD: CPT | Mod: S$GLB,,, | Performed by: INTERNAL MEDICINE

## 2018-02-12 PROCEDURE — 99999 PR PBB SHADOW E&M-EST. PATIENT-LVL III: CPT | Mod: PBBFAC,,, | Performed by: INTERNAL MEDICINE

## 2018-02-12 NOTE — PATIENT INSTRUCTIONS
Handwashing: Tips for Patients, Family, and Friends    Germs are everywhere around us. Normally, we live with germs without getting sick. In certain cases, harmful germs cause us to get sick with an infection. Or we can spread harmful germs to others and cause them to get sick. Keeping your hands clean is the best way to prevent getting or spreading germs that cause infection. Wash your hands with soap and water or use an alcohol-based hand .  When to clean your hands: For patients  In the hospital or in your home, you can come in contact with many harmful germs. To help prevent infection, wash your hands often, especially:  · After using the bathroom  · Before and after eating  · After coughing or sneezing  · After using a tissue  · After touching or changing a dressing or bandage  · After touching any object or surface that may be contaminated  · After touching an animal during a pet therapy session (hospital)  · After touching an animal, cleaning up after a pet, or preparing food for pets (home)  If you dont have access to soap and water, use an alcohol-based hand gel containing at least 60% alcohol. These products kill most germs and are easy to use. But use soap and water (not alcohol-based hand gel) if your hands are visibly dirty.  When to clean your hands: For family and friends  When visiting or caring for a loved one, washing your hands or using an alcohol-based hand  can help stop germs from spreading. Wash your hands:  · Before entering and after leaving the patients room  · As soon as you remove gloves or other protective clothing  · After changing a dressing or bandage  · After any contact with blood or other body fluids  · After touching or changing the patients bed linen or towels  · After touching an animal during a pet therapy session (hospital)  · After touching an animal, cleaning up after a pet, or preparing food for pets (home)  Many hospitals have sinks or gel dispensers  right outside patient rooms. If not, carry a bottle of alcohol-based hand gel with you, and use it every time you visit. Use soap and water (not alcohol-based hand gel) if your hands are visibly dirty.  Tips for good handwashing  Here are some suggestions to follow:  · Use warm water and plenty of soap. Work up a good lather.  · Clean the whole hand, including under your nails, between your fingers, and up the wrists.  · Wash for at least 15 to 20 seconds. Dont just wipe. Scrub well.  · Rinse, letting the water run down your fingers, not up your wrists.  · Dry your hands well. Use a paper towel to turn off the faucet and open the door.  Time matters  The longer you wash your hands, the more germs youll remove. Most people wash their hands for 6 to 7 seconds. But at least 15 seconds are needed to remove germs. Singing Happy Birthday or the Alphabet Song are examples of how long 15 seconds would be. To protect yourself and others from infection, washing for 30 seconds is best.  How to use an alcohol-based hand   Alcohol-based hand  may kill more germs than soap and water. Use them when your hands arent visibly dirty. For best results, follow these steps:  · Choose a gel or spray that contains at least 60 percent alcohol. Products with less alcohol may not kill germs.  · Spread about a tablespoon of  in the palm of one hand.  · Rub your hands together briskly, cleaning the backs of your hands, the palms, between your fingers, and up the wrists.  · Rub until the  is gone, and your hands are completely dry.  How do antibacterial soaps and alcohol-based hand  differ?  Antibacterial soaps:  · Come in liquid or bar form and are used with water  · Are no better at removing germs than plain soap  Alcohol-based hand :  · Come in gels or sprays that dont need water  · Are as or more effective than washing with soap and water   Date Last Reviewed: 12/1/2016  © 8127-6653 [employer]. [address] [place] [address], [place] [address]. All rights reserved. This information is not intended as a substitute for professional medical care. Always follow your healthcare professional's instructions.

## 2018-02-12 NOTE — PROGRESS NOTES
"Subjective:      Patient ID: Shanda Shaffer is a 62 y.o. female.    Chief Complaint: Atheroembolism of finger, left    HPI: 62 y.o. White female, for the first time in weeks, her finger hurts less.   She saw Dr. Villalta, who told her it seemed to be demarkating, and may  Not be as deep as thought.    Review of Systems   Constitutional: Negative for activity change and unexpected weight change.   HENT: Negative for hearing loss, rhinorrhea and trouble swallowing.    Eyes: Negative for discharge and visual disturbance.   Respiratory: Negative for chest tightness and wheezing.    Cardiovascular: Negative for chest pain and palpitations.   Gastrointestinal: Negative for blood in stool, constipation, diarrhea and vomiting.   Endocrine: Negative for polydipsia and polyuria.   Genitourinary: Negative for difficulty urinating, dysuria, hematuria and menstrual problem.   Musculoskeletal: Negative for arthralgias, joint swelling and neck pain.   Neurological: Negative for weakness and headaches.   Psychiatric/Behavioral: Negative for confusion and dysphoric mood.       Objective:   /60 (BP Location: Right arm, Patient Position: Sitting, BP Method: Large (Manual))   Pulse 96   Resp 16   Ht 5' 4" (1.626 m)   Wt 82 kg (180 lb 12.4 oz)   SpO2 98%   BMI 31.03 kg/m²     Physical Exam   Constitutional: She appears well-developed and well-nourished.   HENT:   Head: Normocephalic and atraumatic.   Eyes: Conjunctivae are normal.   Neck: Normal range of motion.   Cardiovascular: Normal rate, regular rhythm and normal heart sounds.    Pulmonary/Chest: Effort normal and breath sounds normal.   Neurological: She is alert.   Skin: Lesion noted. There is cyanosis.        Nursing note and vitals reviewed.      Assessment:     1. Anticoagulation monitoring, special range    2. Atheroembolism of finger, left      Plan:     Anticoagulation monitoring, special range  -     Protime-INR; Future; Expected date: 02/12/2018  -     APTT; " Future; Expected date: 02/12/2018    Atheroembolism of finger, left  -     Protime-INR; Future; Expected date: 02/12/2018  -     APTT; Future; Expected date: 02/12/2018

## 2018-02-19 ENCOUNTER — OFFICE VISIT (OUTPATIENT)
Dept: INTERNAL MEDICINE | Facility: CLINIC | Age: 63
End: 2018-02-19
Payer: COMMERCIAL

## 2018-02-19 VITALS
BODY MASS INDEX: 31.27 KG/M2 | HEIGHT: 64 IN | OXYGEN SATURATION: 98 % | DIASTOLIC BLOOD PRESSURE: 76 MMHG | WEIGHT: 183.19 LBS | HEART RATE: 74 BPM | SYSTOLIC BLOOD PRESSURE: 128 MMHG | RESPIRATION RATE: 18 BRPM

## 2018-02-19 DIAGNOSIS — I75.012: Primary | ICD-10-CM

## 2018-02-19 DIAGNOSIS — E78.2 MIXED HYPERLIPIDEMIA: ICD-10-CM

## 2018-02-19 PROCEDURE — 99213 OFFICE O/P EST LOW 20 MIN: CPT | Mod: S$GLB,,, | Performed by: INTERNAL MEDICINE

## 2018-02-19 PROCEDURE — 3008F BODY MASS INDEX DOCD: CPT | Mod: S$GLB,,, | Performed by: INTERNAL MEDICINE

## 2018-02-19 PROCEDURE — 99999 PR PBB SHADOW E&M-EST. PATIENT-LVL V: CPT | Mod: PBBFAC,,, | Performed by: INTERNAL MEDICINE

## 2018-02-19 NOTE — PROGRESS NOTES
"        Subjective:      Patient ID: Shanda Shaffer is a 62 y.o. female.    Chief Complaint: Anticoagulation monitoring, special range    HPI: 62 y.o. White female , here in follow up for an atherembolic lesion to tip of left finger.  Tolerating the anticoagulation, without problem.  Thinks that the tramadol may be less effective.  Otherwise, no new complaints.       Review of Systems   All other systems reviewed and are negative.      Objective:   /76 (BP Location: Left arm, Patient Position: Sitting, BP Method: Large (Manual))   Pulse 74   Resp 18   Ht 5' 4" (1.626 m)   Wt 83.1 kg (183 lb 3.2 oz)   SpO2 98%   BMI 31.45 kg/m²     Physical Exam   Constitutional: She is oriented to person, place, and time. She appears well-developed and well-nourished.   HENT:   Head: Normocephalic and atraumatic.   Eyes: Conjunctivae are normal.   Neck: Normal range of motion.   Neurological: She is alert and oriented to person, place, and time.   Skin: Skin is warm and dry.   See photos.  Tip of left index finger dessiccating.   Psychiatric: She has a normal mood and affect. Her behavior is normal.   Nursing note and vitals reviewed.      Assessment:     1. Atheroembolism of finger, left    2. Mixed hyperlipidemia      Plan:     Atheroembolism of finger, left    Mixed hyperlipidemia    Continue same regimen.    "

## 2018-02-19 NOTE — PATIENT INSTRUCTIONS
Taking Your Blood Pressure  Blood pressure is the force of blood against the artery wall as it moves from the heart through the blood vessels. You can take your own blood pressure reading using a digital monitor. Take your readings the same each time, using the same arm. Take readings as often as your healthcare provider instructs.  About blood pressure monitors  Blood pressure monitors are designed for certain ages and cases. You can find monitors for older adults, for pregnant women, and for children. Make sure the one you choose is the right one for your age and situation.  The American [employer] Association recommends an automatic cuff monitor that fits on your upper arm (bicep). The cuff should fit your arm size. A cuff thats too large or too small will not give an accurate reading. Measure around your upper arm to find your size.  Monitors that attach to your finger or wrist are not as accurate as monitors for your upper arm.  Ask your healthcare provider for help in choosing a monitor. Bring your monitor to your next provider visit if you need help in using it the correct way.  The steps below are general instructions for using an automatic digital monitor.  Step 1. Relax    · Take your blood pressure at the same time every day, such as in the morning or evening, or at the time your healthcare provider recommends.  · Wait at least a half-hour after smoking, eating, or exercising. Don't drink coffee, tea, soda, or other caffeinated beverages before checking your blood pressure.  · Sit comfortably at a table with both feet on the floor. Do not cross your legs or feet. Place the monitor near you.  · Rest for a few minutes before you begin.  Step 2. Wrap the cuff    · Place your arm on the table, palm up. Your arm should be at the level of your heart. Wrap the cuff around your upper arm, just above your elbow. Its best done on bare skin, not over clothing. Most cuffs will indicate where the brachial artery (the  blood vessel in the middle of the arm at the inner side of the elbow) should line up with the cuff. Look in your monitor's instruction booklet for an illustration. You can also bring your cuff to your healthcare provider and have them show you how to correctly place the cuff.  Step 3. Inflate the cuff    · Push the button that starts the pump.  · The cuff will tighten, then loosen.  · The numbers will change. When they stop changing, your blood pressure reading will appear.  · Take 2 or 3 readings one minute apart.  Step 4. Write down the results of each reading    · Write down your blood pressure numbers for each reading. Note the date and time. Keep your results in one place, such as a notebook. Even if your monitor has a built-in memory, keep a hard copy of the readings.  · Remove the cuff from your arm. Turn off the machine.  · Bring your blood pressure records with your healthcare providers at each visit.  · If you start a new blood pressure medicine, note the day you started the new medicine. Also note the day if you change the dose of your medicine. This information goes on your blood pressure recording sheet. This will help your healthcare provider monitor how well the medicine changes are working.  · Ask your healthcare provider what numbers should prompt you to call him or her. Also ask what numbers should prompt you to get help right away.  Date Last Reviewed: 11/1/2016  © 2358-9289 [employer]. [address] [place] [address], [place] [address]. All rights reserved. This information is not intended as a substitute for professional medical care. Always follow your healthcare professional's instructions.

## 2018-02-21 ENCOUNTER — OFFICE VISIT (OUTPATIENT)
Dept: PULMONOLOGY | Facility: CLINIC | Age: 63
End: 2018-02-21
Payer: COMMERCIAL

## 2018-02-21 ENCOUNTER — OFFICE VISIT (OUTPATIENT)
Dept: RHEUMATOLOGY | Facility: CLINIC | Age: 63
End: 2018-02-21
Payer: COMMERCIAL

## 2018-02-21 VITALS
BODY MASS INDEX: 31.12 KG/M2 | RESPIRATION RATE: 12 BRPM | DIASTOLIC BLOOD PRESSURE: 72 MMHG | OXYGEN SATURATION: 98 % | HEIGHT: 64 IN | SYSTOLIC BLOOD PRESSURE: 126 MMHG | HEART RATE: 71 BPM | WEIGHT: 182.31 LBS

## 2018-02-21 VITALS
HEART RATE: 77 BPM | BODY MASS INDEX: 31.29 KG/M2 | WEIGHT: 182.31 LBS | DIASTOLIC BLOOD PRESSURE: 72 MMHG | SYSTOLIC BLOOD PRESSURE: 128 MMHG

## 2018-02-21 DIAGNOSIS — R09.89 ALTERED TISSUE PERFUSION: Primary | ICD-10-CM

## 2018-02-21 DIAGNOSIS — R91.8 MULTIPLE LUNG NODULES: ICD-10-CM

## 2018-02-21 DIAGNOSIS — R76.8 ANA POSITIVE: ICD-10-CM

## 2018-02-21 DIAGNOSIS — R91.8 GROUND GLASS OPACITY PRESENT ON IMAGING OF LUNG: ICD-10-CM

## 2018-02-21 DIAGNOSIS — I75.012: ICD-10-CM

## 2018-02-21 DIAGNOSIS — I10 BENIGN HYPERTENSION: ICD-10-CM

## 2018-02-21 PROCEDURE — 99999 PR PBB SHADOW E&M-EST. PATIENT-LVL III: CPT | Mod: PBBFAC,,, | Performed by: INTERNAL MEDICINE

## 2018-02-21 PROCEDURE — 3008F BODY MASS INDEX DOCD: CPT | Mod: S$GLB,,, | Performed by: INTERNAL MEDICINE

## 2018-02-21 PROCEDURE — 99214 OFFICE O/P EST MOD 30 MIN: CPT | Mod: S$GLB,,, | Performed by: INTERNAL MEDICINE

## 2018-02-21 PROCEDURE — 99204 OFFICE O/P NEW MOD 45 MIN: CPT | Mod: S$GLB,,, | Performed by: INTERNAL MEDICINE

## 2018-02-21 ASSESSMENT — ROUTINE ASSESSMENT OF PATIENT INDEX DATA (RAPID3)
PSYCHOLOGICAL DISTRESS SCORE: 3.3
PATIENT GLOBAL ASSESSMENT SCORE: 7.5
FATIGUE SCORE: 7
TOTAL RAPID3 SCORE: 6.11
AM STIFFNESS SCORE: 1, YES
MDHAQ FUNCTION SCORE: .7
PAIN SCORE: 8.5
WHEN YOU AWAKENED IN THE MORNING OVER THE LAST WEEK, PLEASE INDICATE THE AMOUNT OF TIME IT TAKES UNTIL YOU ARE AS LIMBER AS YOU WILL BE FOR THE DAY: 2 HR

## 2018-02-21 NOTE — PROGRESS NOTES
"Subjective:       Patient ID: Shanda Shaffer is a 62 y.o. female.    Chief Complaint: Disease Management        HPI   62YF with hx HTN, HLD, GERD ( EGD showing ulcers per pt on PPI by Dr Olivo @ [facility]), OA of b/l knees s/p joint replacement b/l referred by Dr Araya for TYOA+ve in the setting of atheroembolism.  Since last visit, pt was seen in clinic for a sooner appointment due to worsening pain and more 2nd digit discoloration with ulceration. Discussed with patient regarding trial of anticoagulation since she stated she felt some improvement in the discoloration of 2nd finger when she had received heparin briefly in the ED. Pt is still currently on anticoagulation and was started on sildenafil and antideppressant, being managed by PCP.      Pt was seen by Vascular surgery. Pt was sen by Hand surgeon ( Dr Cintia Villalta @ [facility]) on 02/08/18 who told her that it is not as deep as previously thought and looks like it is healing.        Pt denies fever, chills, CP, SOB, abd pain, changes in bowel/bladder habits, oral/genital ulcers, skin rash, alopecia, joint swelling/pain, new skin lesions.     Initial history  62YF with hx HTN, HLD, GERD ( EGD showing ulcers per pt on PPI by Dr Olivo @ [facility]), OA of b/l knees s/p joint replacement b/l referred by Dr Araya for TOYA+ve in the setting of atheroembolism. Pt reports R 2nd digit which started off as a " hang nail" and became gangrenous seen by Vascular surgery ? Cholesterol embolism. Amputation on Oct 4th 2017. Workup done TOYA 1: 1280 with neg profile, CTA chest with mild atherosclerosis of the aortic arch, no dissection or aneurysms, no LAD and nodular opacity 0.8cm in KSENIA pleura. 2D ECHO done 09/17 showed no evidence of vegetations or thrombi. EF 60-65%.  Pt reported around Dec 15th she developed "silver on 2nd digit and hang nail on 3rd digit L hand" now cold on the tips, discolored and painful to touch  Pt is currently on ASA. Seen by Hand " "surgeon Dr Thorne and started on Levaquin for changes on L hand. Seen by Hematology Dr Reaves who has ordered CTA of L upper extremity for further evaluation of ischemic digits    + weight loss 10lb after amputation of the R digit which she attributed to pain meds causing loss of appetite. Pt reports an episode of finger turning white on her 4th digit on R hand. 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 AM stiffness for about 15 mins.   Pt denies fatigue, oral/nasal/genital ulcers, headaches, fever, chills, n/v, abd pain, CP, SOB, dysphagia, hemoptysis, recent travel, changes in bowel/bladder habits, pleurisy, pericarditis, vision changes,tight skin, thromoboses, photosensitivity, skin rash, raynauds, alopecia, joint swelling or erythema.    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 hx of miscarriage X1 in the 1st trimester  1 . Had 4 children with no complications.    Used to smoke 1pk/dayX20 yrs quit in . Used to use THC and snort cocaine ~ 20yrs ago not anymore.     On previous visit. Pt was seen by Hand surgeon who recommended another opinion from another vascular surgery. Pt stated that he did not have anything to add and gave her a script for Nifedipine which she did not take. Pt reports that her  She was seen by PCP for worsening symptoms and sent to the ED 18 and was seen by Vascular Surgery who stated "likely etiology is cholesterol embolus. If this worsens and progresses, unfortunately finger tip amputation may be inevitable".  Pt states that when she received heparin in the ED, she noted an improvement in the discoloration of her finger.  Pt was crying during encounter and was really upset regarding her finger and watching it " die".    Past Medical History:   Diagnosis Date    Acne vulgaris 2015    Benign " hypertension 2015    Bladder prolapse, female, acquired 2015    Diverticulosis of large intestine without hemorrhage 2015    Esophageal stricture 2015    Former smoker: 1 pack a day for 30 years, quit 3/11/10 3/17/2017    Gastroesophageal reflux disease without esophagitis 2015    Hyperlipidemia     Non morbid obesity due to excess calories 2015    Primary osteoarthritis of both knees 2015     Past Surgical History:   Procedure Laterality Date    bilateral knee surgery  2016    BREAST SURGERY      cyst removal     SECTION      x 4    HAND SURGERY      JOINT REPLACEMENT      KNEE SURGERY Bilateral 2016    TK    TONSILLECTOMY       Family History   Problem Relation Age of Onset    Diabetes Mother     Heart disease Mother     Arthritis Mother     Hypertension Father     Heart disease Father      PPM    Heart attack Father     Diabetes Sister     Heart disease Sister      valve replacement    Diabetes Brother     Arthritis Brother      RA    No Known Problems Son     Cancer Maternal Grandmother      breast    Stroke Maternal Grandmother     No Known Problems Son     No Known Problems Son     No Known Problems Son     Heart failure Neg Hx     Hyperlipidemia Neg Hx          Current Outpatient Prescriptions on File Prior to Visit   Medication Sig Dispense Refill    amLODIPine (NORVASC) 10 MG tablet Take 1 tablet (10 mg total) by mouth once daily. 30 tablet 3    aspirin (ECOTRIN) 81 MG EC tablet Take 2 tablets (162 mg total) by mouth once daily. 60 tablet 11    diclofenac sodium 1 % Gel Apply 2 g topically once daily.      enoxaparin (LOVENOX) 80 mg/0.8 mL Syrg Inject 0.8 mLs (80 mg total) into the skin 2 (two) times daily. 60 Syringe 1    estradiol (ESTRING) 2 mg (7.5 mcg /24 hour) vaginal ring Place 2 mg vaginally every 3 (three) months.      losartan-hydrochlorothiazide 100-25 mg (HYZAAR) 100-25 mg per tablet Take 1 tablet  by mouth once daily.      pantoprazole (PROTONIX) 40 mg GrPS Take 40 mg by mouth once daily.      rosuvastatin (CRESTOR) 20 MG tablet Take 1 tablet (20 mg total) by mouth every evening. 90 tablet 3    sertraline (ZOLOFT) 50 MG tablet Take 1 tablet (50 mg total) by mouth once daily. 30 tablet 11    sildenafil, antihypertensive, (REVATIO) 20 mg Tab Take 1 tablet (20 mg total) by mouth 2 (two) times daily with meals. 60 tablet 11    traMADol (ULTRAM) 50 mg tablet Take 1 tablet (50 mg total) by mouth every 6 (six) hours as needed for Pain. 60 tablet 1    trospium (SANCTURA) 20 mg Tab tablet Take 1 tablet (20 mg total) by mouth 2 (two) times daily. 60 tablet 0     No current facility-administered medications on file prior to visit.      Review of patient's allergies indicates:  No Known Allergies    Review of Systems   Constitutional: Negative for chills.   HENT: Negative for congestion and mouth sores.    Eyes: Negative for visual disturbance.   Respiratory: Negative for cough and chest tightness.    Cardiovascular: Negative for chest pain, palpitations and leg swelling.   Gastrointestinal: Negative for abdominal pain, constipation, diarrhea, nausea and vomiting.   Endocrine: Negative for polyuria.   Genitourinary: Negative for difficulty urinating, frequency, hematuria and urgency.   Musculoskeletal: Negative for arthralgias, back pain and joint swelling.   Skin: Positive for color change. Negative for rash.   Neurological: Negative for dizziness, tremors and numbness.   Hematological: Bruises/bleeds easily.   Psychiatric/Behavioral: Negative for decreased concentration. The patient is not nervous/anxious.          Objective:   /72   Pulse 77   Wt 82.7 kg (182 lb 4.8 oz)   BMI 31.29 kg/m²      Physical Exam   Constitutional: She is oriented to person, place, and time and well-developed, well-nourished, and in no distress.   HENT:   Head: Normocephalic and atraumatic.   Eyes: EOM are normal. Pupils are  equal, round, and reactive to light.   Neck: Normal range of motion. Neck supple.   No carotid or subclavian bruits     Cardiovascular: Normal rate and regular rhythm.    Pulmonary/Chest: Effort normal and breath sounds normal.   Abdominal: Soft. Bowel sounds are normal. She exhibits no distension. There is no tenderness.       Right Side Rheumatological Exam     Examination finds the shoulder, elbow, wrist, knee, 1st PIP, 1st MCP, 2nd PIP, 2nd MCP, 3rd PIP, 3rd MCP, 4th PIP, 4th MCP, 5th PIP and 5th MCP normal.    Left Side Rheumatological Exam     Examination finds the shoulder, elbow, wrist, knee, 1st PIP, 1st MCP, 2nd PIP, 2nd MCP, 3rd PIP, 3rd MCP, 4th PIP, 4th MCP, 5th PIP and 5th MCP normal.      Lymphadenopathy:     She has no cervical adenopathy.   Neurological: She is alert and oriented to person, place, and time.   Skin: Skin is warm and dry. No rash noted. No erythema. No pallor.     No nail pitting  Healing/scab formation and necrosis of the distal L 2nd digit with some tenderness to palpation     Psychiatric: Affect normal.   Musculoskeletal: Normal range of motion. She exhibits tenderness. She exhibits no edema or deformity.   2+ radial pulses b/l  Posterior tibial pulses 1+ on RLE  DP 2+ on LLE           Results for GLADYS FOLEY (MRN [id number]) as of 1/24/2018 16:52   Ref. Range 1/19/2018 11:42   WBC Latest Ref Range: 3.90 - 12.70 K/uL 7.12   RBC Latest Ref Range: 4.00 - 5.40 M/uL 4.40   Hemoglobin Latest Ref Range: 12.0 - 16.0 g/dL 13.5   Hematocrit Latest Ref Range: 37.0 - 48.5 % 39.9   MCV Latest Ref Range: 82 - 98 fL 91   MCH Latest Ref Range: 27.0 - 31.0 pg 30.7   MCHC Latest Ref Range: 32.0 - 36.0 g/dL 33.8   RDW Latest Ref Range: 11.5 - 14.5 % 12.4   Platelets Latest Ref Range: 150 - 350 K/uL 333   MPV Latest Ref Range: 9.2 - 12.9 fL 8.8 (L)   Gran% Latest Ref Range: 38.0 - 73.0 % 68.0   Gran # Latest Ref Range: 1.8 - 7.7 K/uL 4.8   Immature Granulocytes Latest Ref Range: 0.0 - 0.5 % 0.4    Immature Grans (Abs) Latest Ref Range: 0.00 - 0.04 K/uL 0.03   Lymph% Latest Ref Range: 18.0 - 48.0 % 22.8   Lymph # Latest Ref Range: 1.0 - 4.8 K/uL 1.6   Mono% Latest Ref Range: 4.0 - 15.0 % 6.3   Mono # Latest Ref Range: 0.3 - 1.0 K/uL 0.5   Eosinophil% Latest Ref Range: 0.0 - 8.0 % 2.1   Eos # Latest Ref Range: 0.0 - 0.5 K/uL 0.2   Basophil% Latest Ref Range: 0.0 - 1.9 % 0.4   Baso # Latest Ref Range: 0.00 - 0.20 K/uL 0.03   nRBC Latest Ref Range: 0 /100 WBC 0     Results for GLADYS FOLEY (MRN [id number]) as of 1/24/2018 16:52   Ref. Range 1/19/2018 11:42   Sodium Latest Ref Range: 136 - 145 mmol/L 138   Potassium Latest Ref Range: 3.5 - 5.1 mmol/L 3.6   Chloride Latest Ref Range: 95 - 110 mmol/L 102   CO2 Latest Ref Range: 23 - 29 mmol/L 27   Anion Gap Latest Ref Range: 8 - 16 mmol/L 9   BUN, Bld Latest Ref Range: 8 - 23 mg/dL 15   Creatinine Latest Ref Range: 0.5 - 1.4 mg/dL 0.8   eGFR if non African American Latest Ref Range: >60 mL/min/1.73 m^2 >60.0   eGFR if African American Latest Ref Range: >60 mL/min/1.73 m^2 >60.0   Glucose Latest Ref Range: 70 - 110 mg/dL 102   Calcium Latest Ref Range: 8.7 - 10.5 mg/dL 9.7   Alkaline Phosphatase Latest Ref Range: 55 - 135 U/L 77   Total Protein Latest Ref Range: 6.0 - 8.4 g/dL 7.2   Albumin Latest Ref Range: 3.5 - 5.2 g/dL 3.8   Total Bilirubin Latest Ref Range: 0.1 - 1.0 mg/dL 0.5   AST Latest Ref Range: 10 - 40 U/L 23   ALT Latest Ref Range: 10 - 44 U/L 28       Results for OGGLADYS (MRN [id number]) as of 1/3/2018 17:12   Ref. Range 12/20/2017 17:06   TOYA HEP-2 Titer Unknown Positive 1:1280 C...   Anti-SSA Antibody Latest Ref Range: 0.00 - 19.99 EU 1.06   Anti-SSA Interpretation Latest Ref Range: Negative  Negative   Anti-SSB Antibody Latest Ref Range: 0.00 - 19.99 EU 0.00   Anti-SSB Interpretation Latest Ref Range: Negative  Negative   ds DNA Ab Latest Ref Range: Negative 1:10  Negative 1:10   Anti Sm Antibody Latest Ref Range: 0.00 - 19.99 EU 0.58    Anti-Sm Interpretation Latest Ref Range: Negative  Negative   Anti Sm/RNP Antibody Latest Ref Range: 0.00 - 19.99 EU 0.72   Anti-Sm/RNP Interpretation Latest Ref Range: Negative  Negative   Results for GLADYS FOLEY (MRN [id number]) as of 1/3/2018 17:12   Ref. Range 12/20/2017 17:06 1/3/2018 13:38   CCP Antibodies Latest Ref Range: <5.0 U/mL  <0.5   Rheumatoid Factor Latest Ref Range: 0.0 - 15.0 IU/mL 12.0    Results for GLADYS FOLEY (MRN [id number]) as of 1/24/2018 16:52   Ref. Range 1/3/2018 13:38   CCP Antibodies Latest Ref Range: <5.0 U/mL <0.5       Results for GLADYS FOLEY (MRN [id number]) as of 1/3/2018 17:12   Ref. Range 12/26/2017 16:14   Complement (C-3) Latest Ref Range: 50 - 180 mg/dL 127   Complement (C-4) Latest Ref Range: 11 - 44 mg/dL 20     Results for GLADYS FOLEY (MRN [id number]) as of 1/24/2018 16:52   Ref. Range 1/3/2018 13:38   Complement,Total, Serum Latest Ref Range: 42 - 95 U/mL 63     Results for GLADYS FOLEY (MRN [id number]) as of 1/24/2018 16:52   Ref. Range 1/3/2018 13:38   Cytoplasmic Neutrophilic Ab Latest Ref Range: <1:20 Titer <1:20   Perinuclear (P-ANCA) Latest Ref Range: <1:20 Titer <1:20   ANCA Proteinase 3 Latest Ref Range: <0.4 (Negative) U <0.2   MPO Latest Ref Range: <=20 UNITS 3     Results for GLADYS FOLEY (MRN [id number]) as of 1/24/2018 16:52   Ref. Range 12/26/2017 16:14   Cryoglobulin, Qualitative Latest Ref Range: Absent  Absent       Results for GLADYS FOLEY (MRN [id number]) as of 1/24/2018 16:52   Ref. Range 1/3/2018 13:16   Specimen UA Unknown Urine, Unspecified   Color, UA Latest Ref Range: Yellow, Straw, Litzy  Straw   pH, UA Latest Ref Range: 5.0 - 8.0  7.0   Specific Gravity, UA Latest Ref Range: 1.005 - 1.030  1.010   Appearance, UA Latest Ref Range: Clear  Clear   Protein, UA Latest Ref Range: Negative  Negative   Glucose, UA Latest Ref Range: Negative  Negative   Ketones, UA Latest Ref Range: Negative  Negative   Occult Blood UA Latest  Ref Range: Negative  Negative   Nitrite, UA Latest Ref Range: Negative  Negative   Urobilinogen, UA Latest Ref Range: <2.0 EU/dL Negative   Bilirubin (UA) Latest Ref Range: Negative  Negative   Leukocytes, UA Latest Ref Range: Negative  Negative   Prot/Creat Ratio, Ur Latest Ref Range: 0.00 - 0.20  Unable to calculate   Protein, Urine Random Latest Ref Range: 0 - 15 mg/dL <7         Results for GLADYS FOLEY (MRN [id number]) as of 1/24/2018 16:52   Ref. Range 1/3/2018 13:16   Benzodiazepines Unknown Negative   Methadone metabolites Unknown Negative   Phencyclidine Unknown Negative   Cocaine (Metab.) Unknown Negative   Opiate Scrn, Ur Unknown Negative   Barbiturate Screen, Ur Unknown Negative   Amphetamine Screen, Ur Unknown Negative   Marijuana (THC) Metabolite Unknown Negative       SPEP  Normal total protein, normal pattern.       02/21/18                  Clinic visit 01/24/18                          Previous clinic visit pictures             Previous R 2nd digit prior to amputation          CT chest 02/02/18  Findings:    Base of Neck:  No significant abnormality.     Thoracic Soft tissue:  No significant abnormality.     Aorta: There is a left-sided aortic arch with three branch vessels.  Normal in course and caliber, with moderate atherosclerosis.      Heart: The heart is not enlarged. There is no evidence of pericardial effusion. There is calcific atherosclerosis of the coronary vessels.    Pulmonary vasculature: Pulmonary arteries distribute normally.  There are four pulmonary veins. Systemic and pulmonary venoatrial connections are concordant.     Carisa/Mediastinum: No lymphadenopathy..     Esophagus: Small hiatal hernia..     Upper Abdomen: Left renal cyst which is incompletely evaluated on today's examination.     Airways: Patent. 2  Lungs/Pleura: Resolution of patchy groundglass opacification within the anterior right upper lobe and to a lesser extent anterior left upper lobe identified on patient's  recent CTA upper extremity likely representing dependent atelectasis.  Calcified granuloma within the left upper lobe measuring 1.0 cm and several other smaller calcified granulomas bilaterally.  Symmetrically expanded, without  consolidation, pneumothorax, mass, or significant pleural thickening.  There is no pleural effusion.     Bones: No acute fracture. Age appropriate degenerative changes.    Resolution of patchy groundglass opacification within the anterior right upper lobe and to a lesser extent anterior left upper lobe which was identified on patient's recent CTA upper extremity 01/10/2018 which likely represented dependent atelectasis.    Calcific atherosclerosis of the aorta and coronary vessels.    Left renal cyst incompletely evaluated on today's examination.      US L arm 01/08/17  Color flow duplex reveals a patent left upper extremity arterial tree with no evidence of a hemodynamically significant stenosis.    CTA chest 01/10/18  No evidence of vascular occlusion to the level of the wrist. Recommend referral to interventional radiology for angiogram if there is concern for patency of the distal vessels of the hand.  Mild calcific atherosclerosis of the subclavian artery origin.  Right upper lobe groundglass consolidation possibly infectious or inflammatory      CTA chest 08/17  Examination of the soft tissue and vascular structures at the base of the neck is unremarkable.  There is a left-sided aortic arch with 3 branch vessels.  The thoracic aorta maintains normal caliber, contour, and course with mild atherosclerotic calcification.  There is no evidence of aneurysmal dilation or dissection.    The pulmonary arteries distribute normally without evidence of filling defect to indicate pulmonary thromboembolism.      The heart is not enlarged.  No pericardial effusion.    There is no axillary, mediastinal, or hilar lymph node enlargement.      The esophagus maintains a normal course and caliber.  There  is a small hiatal hernia.    Limited images of the upper abdomen obtained during the course of this dedicated thoracic CT demonstrate nothing unusual.  Incidental note is made of splenules.    The osseous structures demonstrate age-related degenerative change.    The trachea and proximal airways are patent.    The lungs are symmetrically expanded with limited pulmonary parenchymal evaluation secondary to motion artifact on this study with technique optimized for pulmonary arterial evaluation.  Bibasilar dependent atelectasis. No evidence of consolidation, pneumothorax, mass, or significant pleural thickening.  There is a 0.8 cm nodular opacity abutting the pleura in the apicoposterior segment of the LEFT upper lobe with central calcification likely representing granulomatous disease.  Additionally, there are few calcified pulmonary micronodules bilaterally.  There is no evidence of pleural fluid.    Xray Knee 12/20/17  Right: There is a TKA in place alignment no complication  Left: There is a TKA in place with good alignment and no complication    2D ECHO 09/17  CONCLUSIONS     1 - Normal left ventricular systolic function (EF 60-65%).     2 - Normal right ventricular systolic function .     3 - Normal left ventricular diastolic function.     4 - Mild tricuspid regurgitation.     5 - The estimated PA systolic pressure is 29 mmHg.     6 - No evidence of intracardiac shunt      Assessment:       1. Altered tissue perfusion    2. Atheroembolism of finger, left    3. TOYA positive    4. Benign hypertension            Plan:         Shanda was seen today for disease management.    Diagnoses and all orders for this visit:    Altered tissue perfusion  All rheumatology workup is negative for cause of digit ischemia.  Low suspicion for autoimmune cause for pt's symptoms.  HIV, Hep, Quantiferon gold TB neg, RPR neg, ANCA, MPO,PR3 neg, Utox is negative. No BP asymmetry, minimally elevated inflammatory marker ESR 22 and normal  CRP, normal albumin makes chronic inflammatory process less likely. 2D ECHO shows EF 60-65%, no thrombi/vegetations, no effusions or shunts.US of L arm done 01/18 showed a patent left upper extremity arterial tree with no evidence of a hemodynamically significant stenosis. CTA L arm 01/18 shows no evidence of vascular occlusion to the level of the wrist although did show RUL groundglass consolidation. CXR was obtained which showed a 0.8 cm nodule with central calcification in the posterior aspect of the apicoposterior segment of the LEFT upper lobe consistent with granuloma with no evidence of consolidation. Subsequent CT chest 02/02/18 was obtained which showed resolution of patchy groundglass opacification,calcific atherosclerosis of the aorta and coronary vessels,calcified granuloma within the left upper lobe measuring 1.0 cm and several other smaller calcified granulomas bilaterally. Pt to keep upcoming appt with Pulmonary.      Pt was seen by Vascular surgery who thinks symptoms likely related to cholesterol emboli and no surgical intervention needed.  No indication for steroids based on workup. Pt reported some improvement in her fingers when she received anticoagulation (heparin) while in the ED 01/19/17, and we decided with assistance of PCP on last visit for trial of anticoagulation which she feels is helping. Continue trial of full dose lovenox for a couple weeks and follow up with hand/Vascular surgery and PCP.        TOYA positive  TOYA 1:1280 centromere with negative profile. Normal complements. APS workup negative. CBC shows no cytopenias or eosinophilia. metabolic profile shows normal liver and renal function. ESR 22, CRP 0.9. SPEP wnl. Hypercoagulable workup negative. Pt does not meet SLICC criteria for SLE based on symptoms and labs.  No evidence of synovitis on exam, RF, ACPA neg. Low suspicion for inflammatory arthritis.   CTA chest showing mild atherosclerosis,nodular opacity 0.8cm pleura KSENIA with  no evidence of aortic aneurysmal dilation or dissection, no LAD.   Family history of autoimmune disease (brother with RA and niece with SLE) could explain + TOYA.     Atheroembolism of  L finger  Mild atherosclerosis subclavian on CTA L arm noted.   Continue ASA and statin  See above #   F/u Vascular surgery      Benign hypertension  BP stable.   Continue amlodipine  F/u PCP

## 2018-02-21 NOTE — LETTER
February 21, 2018      Angélica Lacey MD  [address] [employer] [facility]  [place] [address]           [facility]  [address]  [place] [address]  Phone: [phone]          Patient: Shanda Shaffer   MR Number: [id number]   YOB: 1955   Date of Visit: 2/21/2018       Dear Dr. Angélica Lacey:    Thank you for referring Shanda Shaffer to me for evaluation. Attached you will find relevant portions of my assessment and plan of care.    If you have questions, please do not hesitate to call me. I look forward to following Shanda Shaffer along with you.    Sincerely,    ELLA Wilson MD    Enclosure  CC:  No Recipients    If you would like to receive this communication electronically, please contact [email or web address] or [phone] to request more information on [employer] access.    For providers and/or their staff who would like to refer a patient to [facility], please contact us through our one-stop-shop provider referral line, [facility], at [phone].    If you feel you have received this communication in error or would no longer like to receive these types of communications, please e-mail [email or web address]

## 2018-02-22 NOTE — PROGRESS NOTES
I have personally taken the history and examined the patient to verify the fellow's notation, and I agree with the impression and plan stated.   She has improved with addition of Lovenox.   She has anti-centromere antibodies but no other sx of signs of systemic sclerosis or CREST syndrome.   Will continue to monitor her.

## 2018-02-22 NOTE — PROGRESS NOTES
Subjective:       Patient ID: Shanda Shaffer is a 62 y.o. female.    Chief Complaint: Abnormal Ct Scan    HPI Mrs. Shaffer is a 62-year-old former smoker who comes to follow up abnormality   seen in a previous CT scan.  She has undergone extensive evaluation in the past   6 to 9 months due to ischemia of several fingers.  Her evaluation has not led to   a clear diagnosis for the cause of this vascular problem.  She does not have a   defined connective tissue disease or vasculitis.  She is currently on therapy   with Lovenox; and feels that this has led to beneficial changes in her ischemic   lesions.    Mrs. Shaffer is not aware of any active or past lung diseases.  She is not   having any daily respiratory symptoms.  She had a CT scan of her upper extremity   in December.  This revealed the incidental finding of ground-glass opacities   within the upper lung zones (right greater than left).  She does not recall having any   respiratory symptoms at the time of that scan.  She does have the history of   gastroesophageal reflux, but has had no obvious episodes of choking or   aspiration.    Mrs. Shaffer smoked on average one-half pack of cigarettes per day for 30   years.  She discontinued smoking in 2009.  She believes that her family history   is negative for lung diseases.  She does not know of any important past   occupational exposures.    DATA:  I have reviewed the images from multiple CT scans and chest x-rays.  The   cardiac silhouette is not enlarged.  There are no acute cardiovascular   abnormalities.  There are multiple sub-centimeter pulmonary nodules present bilaterally  (left greater than right).  The largest of these is in the posterior aspect of   the left upper lobe.  It has an area of central calcification in a pattern,   which implies it is a benign lesion.  As noted previously, there are areas of   ground-glass opacity within the upper lung zones in the scan from December.  The   subsequent  scan done earlier this month shows resolution of those opacities.      CB/IN  dd: 02/21/2018 19:11:32 (CST)  td: 02/22/2018 12:44:19 (CST)  Doc ID   #[id number]  Job ID #[id number]    CC:       Review of Systems   Constitutional: Negative for fever and fatigue.   HENT: Negative for postnasal drip, sinus pressure, voice change and congestion.    Respiratory: Negative for cough, sputum production, shortness of breath, wheezing and dyspnea on extertion.    Cardiovascular: Negative for chest pain and leg swelling.   Genitourinary: Negative for difficulty urinating.   Musculoskeletal: Negative for arthralgias and back pain.   Skin:        Ischemic lesion index finger L hand.   Gastrointestinal: Negative for abdominal pain and acid reflux.   Neurological: Negative for dizziness and weakness.   Hematological: Negative for adenopathy.       Objective:      Physical Exam   Constitutional: She is oriented to person, place, and time. She appears well-developed and well-nourished.   HENT:   Head: Normocephalic.   Nose: Nose normal.   Mouth/Throat: No oropharyngeal exudate.   Neck: Normal range of motion. No JVD present. No tracheal deviation present. No thyromegaly present.   Cardiovascular: Normal rate, regular rhythm and normal heart sounds.    No murmur heard.  Pulmonary/Chest: Normal expansion. No stridor. She has no wheezes. She has no rhonchi. She has no rales. She exhibits no tenderness.   Abdominal: Soft. There is no tenderness.   Musculoskeletal: She exhibits no edema.   Lymphadenopathy:     She has no cervical adenopathy.   Neurological: She is alert and oriented to person, place, and time.   Skin: Skin is dry. No rash noted. No erythema. Nails show no clubbing.   Ischemic lesion index finger L hand.   Psychiatric: She has a normal mood and affect.   Vitals reviewed.    Personal Diagnostic Review    No flowsheet data found.      Assessment:       1. Altered tissue perfusion    2. Ground glass opacity present on imaging of  lung    3. Multiple lung nodules        Outpatient Encounter Prescriptions as of 2/21/2018   Medication Sig Dispense Refill    amLODIPine (NORVASC) 10 MG tablet Take 1 tablet (10 mg total) by mouth once daily. 30 tablet 3    aspirin (ECOTRIN) 81 MG EC tablet Take 2 tablets (162 mg total) by mouth once daily. 60 tablet 11    diclofenac sodium 1 % Gel Apply 2 g topically once daily.      enoxaparin (LOVENOX) 80 mg/0.8 mL Syrg Inject 0.8 mLs (80 mg total) into the skin 2 (two) times daily. 60 Syringe 1    estradiol (ESTRING) 2 mg (7.5 mcg /24 hour) vaginal ring Place 2 mg vaginally every 3 (three) months.      losartan-hydrochlorothiazide 100-25 mg (HYZAAR) 100-25 mg per tablet Take 1 tablet by mouth once daily.      pantoprazole (PROTONIX) 40 mg GrPS Take 40 mg by mouth once daily.      rosuvastatin (CRESTOR) 20 MG tablet Take 1 tablet (20 mg total) by mouth every evening. 90 tablet 3    sertraline (ZOLOFT) 50 MG tablet Take 1 tablet (50 mg total) by mouth once daily. 30 tablet 11    sildenafil, antihypertensive, (REVATIO) 20 mg Tab Take 1 tablet (20 mg total) by mouth 2 (two) times daily with meals. 60 tablet 11    traMADol (ULTRAM) 50 mg tablet Take 1 tablet (50 mg total) by mouth every 6 (six) hours as needed for Pain. 60 tablet 1    trospium (SANCTURA) 20 mg Tab tablet Take 1 tablet (20 mg total) by mouth 2 (two) times daily. 60 tablet 0     No facility-administered encounter medications on file as of 2/21/2018.      No orders of the defined types were placed in this encounter.    Plan:     No specific recommendations at present.  No further imaging of chest needed unless new thoracic symptoms arise.  Call/return if needed.

## 2018-02-22 NOTE — PATIENT INSTRUCTIONS
No specific recommendations at present.  No further imaging of chest needed unless new thoracic symptoms arise.  Call/return if needed.

## 2018-02-28 DIAGNOSIS — I75.011 ATHEROEMBOLISM OF RIGHT UPPER EXTREMITY: ICD-10-CM

## 2018-02-28 DIAGNOSIS — I96 NECROSIS OF FINGER: ICD-10-CM

## 2018-02-28 DIAGNOSIS — R09.89 ALTERED TISSUE PERFUSION: ICD-10-CM

## 2018-03-01 RX ORDER — TRAMADOL HYDROCHLORIDE 50 MG/1
TABLET ORAL
Qty: 60 TABLET | Refills: 1 | OUTPATIENT
Start: 2018-03-01

## 2018-03-02 DIAGNOSIS — I96 NECROSIS OF FINGER: ICD-10-CM

## 2018-03-02 DIAGNOSIS — R09.89 ALTERED TISSUE PERFUSION: ICD-10-CM

## 2018-03-02 DIAGNOSIS — I75.011 ATHEROEMBOLISM OF RIGHT UPPER EXTREMITY: ICD-10-CM

## 2018-03-02 RX ORDER — TRAMADOL HYDROCHLORIDE 50 MG/1
50 TABLET ORAL EVERY 6 HOURS PRN
Qty: 10 TABLET | Refills: 0 | Status: SHIPPED | OUTPATIENT
Start: 2018-03-02 | End: 2018-03-05 | Stop reason: DRUGHIGH

## 2018-03-02 RX ORDER — TRAMADOL HYDROCHLORIDE 50 MG/1
TABLET ORAL
Qty: 60 TABLET | Refills: 1 | OUTPATIENT
Start: 2018-03-02

## 2018-03-02 NOTE — TELEPHONE ENCOUNTER
----- Message from Evon Lara sent at 3/2/2018 12:54 PM CST -----  Contact: [phone]  Pt requesting a refill on rx traMADol (ULTRAM) 50 mg tablet sent to [facility] in [place] . Pt its going to be out of the medication tomorrow . Please advise

## 2018-03-05 ENCOUNTER — OFFICE VISIT (OUTPATIENT)
Dept: INTERNAL MEDICINE | Facility: CLINIC | Age: 63
End: 2018-03-05
Payer: COMMERCIAL

## 2018-03-05 VITALS
BODY MASS INDEX: 30.71 KG/M2 | DIASTOLIC BLOOD PRESSURE: 66 MMHG | HEIGHT: 64 IN | RESPIRATION RATE: 18 BRPM | WEIGHT: 179.88 LBS | HEART RATE: 86 BPM | OXYGEN SATURATION: 97 % | SYSTOLIC BLOOD PRESSURE: 136 MMHG

## 2018-03-05 DIAGNOSIS — I96 NECROSIS OF FINGER: ICD-10-CM

## 2018-03-05 DIAGNOSIS — I75.011 ATHEROEMBOLISM OF RIGHT UPPER EXTREMITY: Primary | ICD-10-CM

## 2018-03-05 DIAGNOSIS — R09.89 ALTERED TISSUE PERFUSION: ICD-10-CM

## 2018-03-05 DIAGNOSIS — M79.645 PAIN OF FINGER OF LEFT HAND: ICD-10-CM

## 2018-03-05 PROCEDURE — 99999 PR PBB SHADOW E&M-EST. PATIENT-LVL III: CPT | Mod: PBBFAC,,, | Performed by: INTERNAL MEDICINE

## 2018-03-05 PROCEDURE — 99213 OFFICE O/P EST LOW 20 MIN: CPT | Mod: S$GLB,,, | Performed by: INTERNAL MEDICINE

## 2018-03-05 RX ORDER — GABAPENTIN 300 MG/1
300 CAPSULE ORAL 3 TIMES DAILY
Qty: 90 CAPSULE | Refills: 6 | Status: SHIPPED | OUTPATIENT
Start: 2018-03-05 | End: 2018-04-19 | Stop reason: ALTCHOICE

## 2018-03-05 RX ORDER — TRAMADOL HYDROCHLORIDE 50 MG/1
50 TABLET ORAL EVERY 12 HOURS PRN
Qty: 30 TABLET | Refills: 0 | Status: SHIPPED | OUTPATIENT
Start: 2018-03-05 | End: 2018-03-15

## 2018-03-05 RX ORDER — ENOXAPARIN SODIUM 100 MG/ML
80 INJECTION SUBCUTANEOUS 2 TIMES DAILY
Qty: 60 SYRINGE | Refills: 1 | Status: SHIPPED | OUTPATIENT
Start: 2018-03-05 | End: 2018-03-29 | Stop reason: SDUPTHER

## 2018-03-05 NOTE — PATIENT INSTRUCTIONS
High Cholesterol: Assessing Your Risk    Have you been told that your cholesterol is too high? If so, you could be heading for a heart attack, also known as acute myocardial infarction (AMI), or stroke. This is especially true if you have other risk factors for heart disease. Get smart about cholesterol and your heart disease risk. This sheet can help you understand your heart disease risk and how your cholesterol level affects it. Talk to your healthcare provider about how to get started controlling your cholesterol.  Why is high cholesterol a problem?  Blood cholesterol is a fatty substance. The body uses it to make membranes in cells and for hormone production. It travels through the bloodstream and is used by the tissues for normal function. When blood cholesterol is high, it forms plaque and causes inflammation. The plaque builds up in the walls of arteries (blood vessels that carry blood from the heart to the body). This narrows the opening for blood flow. Over time, the heart may not get enough oxygen. This can lead to coronary artery disease, heart attack, or stroke.  3 steps to assessing your risk  Step 1. Find your risk factors for heart disease and stroke  How your cholesterol numbers affect your heart health depends on other risk factors for heart attack and stroke. Check off each risk factor below that applies to you:  · Are you a man 45 years old or older or a woman 55 years old or older?  · Does your family have a history of heart problems before the age of 55 in male relatives or age 65 in female relatives? This includes heart attack, coronary heart disease, or atherosclerosis.  · Do you have high blood pressure? Do you take medicine to treat high blood pressure?  · Do you smoke?  · Do you have diabetes?  · Do you exercise very little or not very often? Recommendations are for 30 minutes of exercise at least 5 days a week. If you are not doing cardiovascular exercise as often as these  recommendations, it may not be enough and you may be at higher risk for elevated cholesterol and heart disease.  · Do you eat a diet that is high in saturated or trans fats, cholesterol, sugar, or alcohol? You may be at increased risk for heart disease if you do not eat enough fruits, vegetables, lean meats and eat sugars or drink alcohol sparingly.  · Have you been told you have high cholesterol? Do you take medicine to control your cholesterol?  Step 2. Test your cholesterol  Have your cholesterol tested every 5 years after the age of 20 and more often if you have risk factors. Cholesterol testing most often needs no preparation. Sometimes you may be asked to fast (not eat) before your test. A blood sample is taken and sent to a lab. There, the amount of cholesterol and triglyceride in your blood is measured. There are 2 types of cholesterol in the sample. The first is HDL (good cholesterol). The second is LDL (bad cholesterol). Cholesterol test results are most often shown as the total of HDL and LDL cholesterol numbers. You may also be told the separate HDL and LDL cholesterol results.  Fill in your numbers below.  HDL cholesterol:                           LDL cholesterol:                         Total cholesterol:                         Triglyceride:                           Step 3. Discuss the results with your healthcare provider   If your cholesterol levels are higher than normal, your healthcare provider will help you with steps to take to lower your levels. Steps may include lifestyle changes like diet, physical activity, and quitting smoking, and medicine to lower bad cholesterol levels.  If you have high cholesterol, you may need your cholesterol level tested more often to make sure your medicine and lifestyle changes are working to reduce your risks of having a heart attack or stroke.   Date Last Reviewed: 6/1/2016  © 0984-6504 [employer]. [address] [place] [address], [place] [address].  All rights reserved. This information is not intended as a substitute for professional medical care. Always follow your healthcare professional's instructions.

## 2018-03-05 NOTE — PROGRESS NOTES
"        Subjective:      Patient ID: Shanda Shaffer is a 62 y.o. female.    Chief Complaint: Atheroembolism of finger, left    HPI: 62 y.o. White female , seen by the Pulmonologist, who did not think that the infiltrates on the CT were of note to biopsy.   Also, she was seen by the Rheumatologist, who did not think she had a rheum prooblem ( Raynaud's, CREST), so no further  Testing nor treatment offered there.  She went to see Dr. Cintia Villalta, hand surgery at [facility], who thinks perhaps the necrosis is not as deep as previously thought,  And that the lesion will demarkate, and fall off.  Be that  As it is, she has had no problem with bleeding, despite being on lovenox for anticoagulation, and sildenafil for nitrous oxide  Function. Pain is an issue, lancinating,sharp.    Review of Systems   Constitutional: Negative for activity change, appetite change and fatigue.   HENT: Negative.    Respiratory: Negative for chest tightness and wheezing.    Cardiovascular: Negative for chest pain and palpitations.   Gastrointestinal: Negative for anal bleeding and blood in stool.   Genitourinary: Negative.    Musculoskeletal: Negative.    Skin: Negative.    Neurological: Negative.    Psychiatric/Behavioral: Negative for dysphoric mood.       Objective:   /66 (BP Location: Right arm, Patient Position: Sitting, BP Method: Large (Manual))   Pulse 86   Resp 18   Ht 5' 4" (1.626 m)   Wt 81.6 kg (179 lb 14.3 oz)   SpO2 97%   BMI 30.88 kg/m²     Physical Exam   Constitutional: She is oriented to person, place, and time. She appears well-developed and well-nourished.   HENT:   Head: Normocephalic and atraumatic.   Nose: Nose normal.   Mouth/Throat: Oropharynx is clear and moist.   Eyes: Conjunctivae are normal. Pupils are equal, round, and reactive to light.   Neck: Normal range of motion.   Cardiovascular: Normal rate, regular rhythm and normal heart sounds.    Pulmonary/Chest: Effort normal and breath sounds normal. "   Abdominal: Soft. Bowel sounds are normal.   Musculoskeletal:   See photo   Neurological: She is alert and oriented to person, place, and time.   Skin: Skin is warm and dry.   Psychiatric: She has a normal mood and affect. Her behavior is normal.   Vitals reviewed.      Assessment:     1. Atheroembolism of right upper extremity    2. Pain of finger of left hand    3. Necrosis of finger    4. Altered tissue perfusion    Very slow demarcation of lesion. Will decrease the tramadol, add neurontin. She cannot also use NSAI.  Plan:     Atheroembolism of right upper extremity  -     enoxaparin (LOVENOX) 80 mg/0.8 mL Syrg; Inject 0.8 mLs (80 mg total) into the skin 2 (two) times daily.  Dispense: 60 Syringe; Refill: 1    Pain of finger of left hand  -     gabapentin (NEURONTIN) 300 MG capsule; Take 1 capsule (300 mg total) by mouth 3 (three) times daily.  Dispense: 90 capsule; Refill: 6  -     enoxaparin (LOVENOX) 80 mg/0.8 mL Syrg; Inject 0.8 mLs (80 mg total) into the skin 2 (two) times daily.  Dispense: 60 Syringe; Refill: 1    Necrosis of finger  -     enoxaparin (LOVENOX) 80 mg/0.8 mL Syrg; Inject 0.8 mLs (80 mg total) into the skin 2 (two) times daily.  Dispense: 60 Syringe; Refill: 1    Altered tissue perfusion  -     enoxaparin (LOVENOX) 80 mg/0.8 mL Syrg; Inject 0.8 mLs (80 mg total) into the skin 2 (two) times daily.  Dispense: 60 Syringe; Refill: 1    Other orders  -     traMADol (ULTRAM) 50 mg tablet; Take 1 tablet (50 mg total) by mouth every 12 (twelve) hours as needed for Pain.  Dispense: 30 tablet; Refill: 0

## 2018-03-08 ENCOUNTER — TELEPHONE (OUTPATIENT)
Dept: INTERNAL MEDICINE | Facility: CLINIC | Age: 63
End: 2018-03-08

## 2018-03-08 NOTE — TELEPHONE ENCOUNTER
Patient wants to know what she can take for a cough and cold. Explained to her that she can NOT take ibuprofen. She can take Tylenol, since she has HTN she needs to avoid decongestants. Told her she could try Coricidin HBP. Patient verbalized understanding

## 2018-03-08 NOTE — TELEPHONE ENCOUNTER
----- Message from Genny Gentile sent at 3/8/2018  9:15 AM CST -----  Contact: Self/ [phone]  Patient called in requesting to speak with you. Patient prefers to speak with a nurse.     Please call and advise.

## 2018-03-08 NOTE — TELEPHONE ENCOUNTER
----- Message from Yamileth Cotter sent at 3/8/2018  9:42 AM CST -----  Contact: [phone]/self  Patient states she is returning your call. Please advise.

## 2018-03-19 ENCOUNTER — OFFICE VISIT (OUTPATIENT)
Dept: ORTHOPEDICS | Facility: CLINIC | Age: 63
End: 2018-03-19
Payer: COMMERCIAL

## 2018-03-19 ENCOUNTER — HOSPITAL ENCOUNTER (OUTPATIENT)
Dept: RADIOLOGY | Facility: HOSPITAL | Age: 63
Discharge: HOME | End: 2018-03-19
Attending: ORTHOPAEDIC SURGERY
Payer: COMMERCIAL

## 2018-03-19 VITALS — WEIGHT: 185.75 LBS | BODY MASS INDEX: 31.71 KG/M2 | HEIGHT: 64 IN

## 2018-03-19 DIAGNOSIS — M25.562 LEFT KNEE PAIN, UNSPECIFIED CHRONICITY: ICD-10-CM

## 2018-03-19 DIAGNOSIS — M25.562 LEFT KNEE PAIN, UNSPECIFIED CHRONICITY: Primary | ICD-10-CM

## 2018-03-19 PROCEDURE — 99213 OFFICE O/P EST LOW 20 MIN: CPT | Mod: S$GLB,,, | Performed by: ORTHOPAEDIC SURGERY

## 2018-03-19 PROCEDURE — 99999 PR PBB SHADOW E&M-EST. PATIENT-LVL III: CPT | Mod: PBBFAC,,, | Performed by: ORTHOPAEDIC SURGERY

## 2018-03-19 RX ORDER — PANTOPRAZOLE SODIUM 40 MG/1
TABLET, DELAYED RELEASE ORAL
COMMUNITY
Start: 2018-03-05 | End: 2018-04-12 | Stop reason: SDUPTHER

## 2018-03-19 NOTE — PROGRESS NOTES
"Subjective:      Patient ID: Shanda Shaffer is a 63 y.o. female.    Chief Complaint: Pain of the Left Knee    HPI  Shanda Shaffer has left knee pain.  The pain has improved. The pain is located in the anterior aspect of the knee.  There  is not radiation.   There is not associated stiffness.   There is not catching and locking. The pain is described as achy. The pain is aggravated by stairs and climbing.  It is alleviated by rest.  There is not associated back pain.  Her history, medications and problem list were reviewed.    ROS      Objective:      Body mass index is 31.88 kg/m².  Vitals:    03/19/18 1051   Weight: 84.2 kg (185 lb 11.8 oz)   Height: 5' 4" (1.626 m)           General    Constitutional: She is oriented to person, place, and time. She appears well-developed and well-nourished.   HENT:   Head: Normocephalic and atraumatic.   Eyes: EOM are normal.   Cardiovascular: Normal rate.    Pulmonary/Chest: Effort normal.   Neurological: She is alert and oriented to person, place, and time.   Psychiatric: She has a normal mood and affect. Her behavior is normal.     General Musculoskeletal Exam   Gait: normal       Right Knee Exam     Inspection   Erythema: absent  Scars: absent  Swelling: absent  Effusion: effusion  Deformity: deformity  Bruising: absent    Tenderness   The patient is experiencing no tenderness.         Range of Motion   Extension: 0   Flexion: 130     Tests   Ligament Examination Lachman: normal (-1 to 2mm)   MCL - Valgus: normal (0 to 2mm)  LCL - Varus: normal  Patella   Passive Patellar Tilt: neutral    Other   Sensation: normal    Left Knee Exam     Inspection   Erythema: absent  Scars: absent  Swelling: absent  Effusion: absent  Deformity: deformity  Bruising: absent    Tenderness   The patient is experiencing no tenderness.         Range of Motion   Extension: 0   Flexion: 130     Tests   Stability Lachman: normal (-1 to 2mm)   MCL - Valgus: normal (0 to 2mm)  LCL - Varus: normal (0 " to 2mm)  Patella   Passive Patellar Tilt: neutral    Other   Sensation: normal    Muscle Strength   Right Lower Extremity   Hip Abduction: 5/5   Quadriceps:  5/5   Hamstrin/5   Left Lower Extremity   Hip Abduction: 5/5   Quadriceps:  5/5 and 4/5   Hamstrin/5     Reflexes     Left Side  Quadriceps:  2+    Right Side   Quadriceps:  2+    Vascular Exam     Right Pulses  Dorsalis Pedis:      2+          Left Pulses  Dorsalis Pedis:      2+          Edema  Right Lower Leg: absent  Left Lower Leg: absent              Assessment:       Encounter Diagnosis   Name Primary?    Left knee pain, unspecified chronicity Yes          Plan:       Shanda was seen today for pain.    Diagnoses and all orders for this visit:    Left knee pain, unspecified chronicity  -     Cancel: X-ray Knee Ortho Left; Future      She did not want xrays today. She is gong to start exercising to strengthen knee.  F/U in 6 months with knee xrays

## 2018-03-29 ENCOUNTER — OFFICE VISIT (OUTPATIENT)
Dept: INTERNAL MEDICINE | Facility: CLINIC | Age: 63
End: 2018-03-29
Payer: COMMERCIAL

## 2018-03-29 VITALS
HEART RATE: 83 BPM | DIASTOLIC BLOOD PRESSURE: 70 MMHG | RESPIRATION RATE: 16 BRPM | WEIGHT: 185.5 LBS | BODY MASS INDEX: 31.67 KG/M2 | HEIGHT: 64 IN | SYSTOLIC BLOOD PRESSURE: 130 MMHG | OXYGEN SATURATION: 97 %

## 2018-03-29 DIAGNOSIS — R09.89 ALTERED TISSUE PERFUSION: ICD-10-CM

## 2018-03-29 DIAGNOSIS — I73.9 PVD (PERIPHERAL VASCULAR DISEASE): ICD-10-CM

## 2018-03-29 DIAGNOSIS — I75.012: Primary | ICD-10-CM

## 2018-03-29 DIAGNOSIS — M79.645 PAIN OF FINGER OF LEFT HAND: ICD-10-CM

## 2018-03-29 DIAGNOSIS — M25.512 ACUTE PAIN OF LEFT SHOULDER: ICD-10-CM

## 2018-03-29 DIAGNOSIS — I96 NECROSIS OF FINGER: ICD-10-CM

## 2018-03-29 PROCEDURE — 99999 PR PBB SHADOW E&M-EST. PATIENT-LVL III: CPT | Mod: PBBFAC,,, | Performed by: INTERNAL MEDICINE

## 2018-03-29 PROCEDURE — 99214 OFFICE O/P EST MOD 30 MIN: CPT | Mod: S$GLB,,, | Performed by: INTERNAL MEDICINE

## 2018-03-29 RX ORDER — ENOXAPARIN SODIUM 100 MG/ML
80 INJECTION SUBCUTANEOUS DAILY
Qty: 60 SYRINGE | Refills: 1 | Status: SHIPPED | OUTPATIENT
Start: 2018-03-29 | End: 2018-04-19 | Stop reason: ALTCHOICE

## 2018-03-29 NOTE — PROGRESS NOTES
"Subjective:      Patient ID: Shanda Shaffer is a 63 y.o. female.    Chief Complaint: Follow-up (4 week follow up left hand index finger)    HPI: 63 y.o. White female ,  Went to see her Chiropractor for her knee, who told her there is nothing  More he could do for that.  She has been having issues with her left shoulder, with a significant decrease in her ROM, from pain.  Additionally she has gone to see the hand surgeon who thinks the eschar is not ready to come off.  And that the finger looks better.  She states it does not hurt as much, and so far is pleased.  However, she is bleeding from the lovenox site injections, and asks whatelse she could use.      Review of Systems   Constitutional: Negative for activity change, chills, diaphoresis and fatigue.   Eyes: Negative for photophobia and pain.   Respiratory: Negative for cough, chest tightness and shortness of breath.    Cardiovascular: Negative for chest pain, palpitations and leg swelling.   Gastrointestinal: Negative for anal bleeding and blood in stool.   Endocrine: Positive for cold intolerance.   Genitourinary: Negative for hematuria and vaginal bleeding.   Neurological: Negative for dizziness, weakness and headaches.   Hematological: Bruises/bleeds easily.       Objective:   /70 (BP Location: Right arm, Patient Position: Sitting, BP Method: Large (Manual))   Pulse 83   Resp 16   Ht 5' 4" (1.626 m)   Wt 84.1 kg (185 lb 8.3 oz)   SpO2 97%   BMI 31.84 kg/m²     Physical Exam   Constitutional: She is oriented to person, place, and time. She appears well-developed and well-nourished.   HENT:   Head: Normocephalic and atraumatic.   Mouth/Throat: Oropharynx is clear and moist.   Eyes: Conjunctivae are normal. Pupils are equal, round, and reactive to light.   Neck: Normal range of motion.   Cardiovascular: Normal rate, regular rhythm and normal heart sounds.    Pulmonary/Chest: Effort normal and breath sounds normal.   Neurological: She is alert and " oriented to person, place, and time.   Skin: Skin is warm and dry.   Tip of left index finger beginning to mummify.  Surrounding area warm,red.  No fluctuance.    Abdominal bruising significant.   Psychiatric: She has a normal mood and affect. Her behavior is normal.   Nursing note and vitals reviewed.      Assessment:     1. Atheroembolism of finger, left    2. Pain of finger of left hand    3. PVD (peripheral vascular disease)    4. Acute pain of left shoulder    5. Necrosis of finger    6. Altered tissue perfusion    Somewhat better.  Pt wants to decrease some meds, and wants to stop Zoloft.  Plan:     Atheroembolism of finger, left    Pain of finger of left hand  -     enoxaparin (LOVENOX) 80 mg/0.8 mL Syrg; Inject 0.8 mLs (80 mg total) into the skin once daily.  Dispense: 60 Syringe; Refill: 1    PVD (peripheral vascular disease)    Acute pain of left shoulder  -     Ambulatory referral to Orthopedics  She cannot have any surgical manipulation ot intra-articular injections while on lovenox.  Necrosis of finger  -     enoxaparin (LOVENOX) 80 mg/0.8 mL Syrg; Inject 0.8 mLs (80 mg total) into the skin once daily.  Dispense: 60 Syringe; Refill: 1    Altered tissue perfusion  -     enoxaparin (LOVENOX) 80 mg/0.8 mL Syrg; Inject 0.8 mLs (80 mg total) into the skin once daily.  Dispense: 60 Syringe; Refill: 1

## 2018-03-30 ENCOUNTER — PATIENT MESSAGE (OUTPATIENT)
Dept: INTERNAL MEDICINE | Facility: CLINIC | Age: 63
End: 2018-03-30

## 2018-04-02 RX ORDER — LOSARTAN POTASSIUM AND HYDROCHLOROTHIAZIDE 25; 100 MG/1; MG/1
TABLET ORAL
Qty: 30 TABLET | Refills: 2 | Status: SHIPPED | OUTPATIENT
Start: 2018-04-02 | End: 2018-04-12 | Stop reason: SDUPTHER

## 2018-04-12 ENCOUNTER — OFFICE VISIT (OUTPATIENT)
Dept: ORTHOPEDICS | Facility: CLINIC | Age: 63
End: 2018-04-12
Payer: COMMERCIAL

## 2018-04-12 VITALS — HEIGHT: 64 IN | BODY MASS INDEX: 31.58 KG/M2 | WEIGHT: 185 LBS

## 2018-04-12 DIAGNOSIS — M79.645 PAIN OF FINGER OF LEFT HAND: Primary | ICD-10-CM

## 2018-04-12 DIAGNOSIS — M25.512 ACUTE PAIN OF LEFT SHOULDER: ICD-10-CM

## 2018-04-12 PROCEDURE — 99999 PR PBB SHADOW E&M-EST. PATIENT-LVL II: CPT | Mod: PBBFAC,,, | Performed by: ORTHOPAEDIC SURGERY

## 2018-04-12 PROCEDURE — 99203 OFFICE O/P NEW LOW 30 MIN: CPT | Mod: 25,S$GLB,, | Performed by: ORTHOPAEDIC SURGERY

## 2018-04-12 PROCEDURE — 20610 DRAIN/INJ JOINT/BURSA W/O US: CPT | Mod: LT,S$GLB,, | Performed by: ORTHOPAEDIC SURGERY

## 2018-04-12 RX ORDER — TRIAMCINOLONE ACETONIDE 40 MG/ML
40 INJECTION, SUSPENSION INTRA-ARTICULAR; INTRAMUSCULAR
Status: COMPLETED | OUTPATIENT
Start: 2018-04-12 | End: 2018-04-12

## 2018-04-12 RX ADMIN — TRIAMCINOLONE ACETONIDE 40 MG: 40 INJECTION, SUSPENSION INTRA-ARTICULAR; INTRAMUSCULAR at 12:04

## 2018-04-12 NOTE — LETTER
April 12, 2018        [facility] LEX Bassett MD  [address]  [address]  [facility] [address]             [facility]  [address] [place] [address] [address]  [facility] [address]  Phone: [phone]   Patient: Shanda Shaffer   MR Number: [id number]   YOB: 1955   Date of Visit: 4/12/2018       Dear Dr. Bassett:    Thank you for referring Shanda Shaffer to me for evaluation. Below are the relevant portions of my assessment and plan of care.            If you have questions, please do not hesitate to call me. I look forward to following Shanda along with you.    Sincerely,      Ronni March Jr., MD           CC  No Recipients

## 2018-04-12 NOTE — PROGRESS NOTES
INITIAL VISIT HISTORY:  A 63-year-old female seen in consultation for Dr. Bassett for evaluation of left shoulder symptoms.  She has had problems in the   left shoulder for the past month or two.  Symptoms are worse with use.  No   trauma or injury is reported.  On an unrelated matter, she has some type of   peripheral vascular disease, which has affected her fingers and she is currently   recovering from gangrenous change of the left index finger, which is improving.    PAST MEDICAL HISTORY:  Significant for hypertension, diverticulosis, esophageal   stricture, hyperlipidemia and peripheral vascular disease.    PAST SURGICAL HISTORY:  Includes , tonsillectomy, breast surgery, knee   surgery, joint replacement and previous amputation, right index fingertip.    FAMILY HISTORY:  Positive for arthritis, diabetes, heart attack.    SOCIAL HISTORY:  The patient is a former smoker, 30-pack-year history, drinks   alcohol occasionally.    REVIEW OF SYSTEMS:  Negative fever, chills, rashes.    CURRENT MEDICATIONS:  Reviewed on chart.    ALLERGIES:  None.    PHYSICAL EXAMINATION:  GENERAL:  Well-developed, well-nourished female in no acute distress, alert and   oriented x3.  EXTREMITIES:  Examination of the upper extremities significant for left   shoulder, demonstrating mild tenderness anterolaterally.  No bruising, no   swelling.  Range of motion of left shoulder is full, but she does have a   positive impingement sign with abduction, internal rotation of the shoulder,   mildly positive supraspinatus stress test.  NEUROLOGIC:  Intact.  NECK:  Nontender.  Examination of left hand demonstrates some gangrenous changes at the tip with a   thick eschar at the tip of the left index finger.  No evidence of infection.    Range of motion slightly decreased.  Mild tenderness at the tip.    IMPRESSION:  1.  Peripheral vascular disease with mild gangrene, left index fingertip,   improving.  2.  Left shoulder  impingement.    PLAN:  Regarding the left shoulder, I recommended that we try an injection.    After pause for timeout, she identified the left shoulder injected with   combination of Kenalog 40 mg, 2 mL Xylocaine, sterile technique.  She tolerated   the procedure well without complication.    I have also recommended she continue current medications.  She is on Lovenox, so   the only anti-inflammatory we can start her on would be Celebrex, but she wants   to talk to Dr. Bassett about that.    As far as the left index finger, I do want her to keep an eye on that.    Fortunately, it is improving, but we will see her back for both problems in a   month.      JULEE  dd: 04/12/2018 12:06:23 (CDT)  td: 04/13/2018 10:30:18 (CDT)  Doc ID   #[id number]  Job ID #[id number]    CC:

## 2018-04-12 NOTE — LETTER
April 12, 2018        [facility] LEX Bassett MD  [address]  [address]  [facility] [address]             [facility]  [address] [place] [address] [address]  [facility] [address]  Phone: [phone]   Patient: Shanda Shaffer   MR Number: [id number]   YOB: 1955   Date of Visit: 4/12/2018       Dear Dr. Bassett:    Thank you for referring Shanda Shaffer to me for evaluation. Below are the relevant portions of my assessment and plan of care.            If you have questions, please do not hesitate to call me. I look forward to following Shanda along with you.    Sincerely,      Ronni March Jr., MD           CC  No Recipients

## 2018-04-19 ENCOUNTER — OFFICE VISIT (OUTPATIENT)
Dept: INTERNAL MEDICINE | Facility: CLINIC | Age: 63
End: 2018-04-19
Payer: COMMERCIAL

## 2018-04-19 VITALS
HEIGHT: 64 IN | HEART RATE: 74 BPM | SYSTOLIC BLOOD PRESSURE: 122 MMHG | DIASTOLIC BLOOD PRESSURE: 80 MMHG | BODY MASS INDEX: 31.62 KG/M2 | RESPIRATION RATE: 16 BRPM | OXYGEN SATURATION: 97 % | WEIGHT: 185.19 LBS

## 2018-04-19 DIAGNOSIS — I75.012: ICD-10-CM

## 2018-04-19 DIAGNOSIS — M79.645 PAIN OF FINGER OF LEFT HAND: Primary | ICD-10-CM

## 2018-04-19 PROCEDURE — 99213 OFFICE O/P EST LOW 20 MIN: CPT | Mod: S$GLB,,, | Performed by: INTERNAL MEDICINE

## 2018-04-19 PROCEDURE — 99999 PR PBB SHADOW E&M-EST. PATIENT-LVL III: CPT | Mod: PBBFAC,,, | Performed by: INTERNAL MEDICINE

## 2018-04-19 NOTE — PROGRESS NOTES
"Subjective:      Patient ID: Shanda Shaffer is a 63 y.o. female.    Chief Complaint: Follow-up (3 week follow up) and Pain (left hand index finger)    HPI: 63 y.o. White female        63y/oWF being followed for a atheroembolic lesion to leftt index finger tip.  Her finger is warmer,redder and demarkating.  Has gone down to once daily lovenox.  Is going to see the hand surgeon next week.  Shoulder feel somewhat better.    Review of Systems   Constitutional: Negative.    Respiratory: Negative.    Cardiovascular: Negative.    Skin: Positive for color change.   Neurological: Negative.    Hematological: Does not bruise/bleed easily.       Objective:   /80 (BP Location: Right arm, Patient Position: Sitting, BP Method: Large (Manual))   Pulse 74   Resp 16   Ht 5' 4" (1.626 m)   Wt 84 kg (185 lb 3.2 oz)   SpO2 97%   BMI 31.79 kg/m²     Physical Exam   Constitutional: She is oriented to person, place, and time. She appears well-developed and well-nourished.   HENT:   Head: Normocephalic and atraumatic.   Mouth/Throat: Oropharynx is clear and moist.   Eyes: Conjunctivae are normal.   Neck: Normal range of motion.   Cardiovascular: Normal rate, regular rhythm and normal heart sounds.    Pulmonary/Chest: Effort normal and breath sounds normal.   Musculoskeletal:   See photo   Neurological: She is alert and oriented to person, place, and time.   Skin: Skin is warm and dry.   Psychiatric: She has a normal mood and affect.   Nursing note and vitals reviewed.      Assessment:     1. Pain of finger of left hand    2. Atheroembolism of finger, left    This is demarkating now to lateral aspect of digit.  Plan:     Pain of finger of left hand    Atheroembolism of finger, left    Stop lovenox. Watch.    "

## 2018-05-02 ENCOUNTER — OFFICE VISIT (OUTPATIENT)
Dept: RHEUMATOLOGY | Facility: CLINIC | Age: 63
End: 2018-05-02
Payer: COMMERCIAL

## 2018-05-02 VITALS
WEIGHT: 187.5 LBS | HEART RATE: 69 BPM | SYSTOLIC BLOOD PRESSURE: 127 MMHG | BODY MASS INDEX: 32.01 KG/M2 | DIASTOLIC BLOOD PRESSURE: 80 MMHG | HEIGHT: 64 IN

## 2018-05-02 DIAGNOSIS — I10 BENIGN HYPERTENSION: ICD-10-CM

## 2018-05-02 DIAGNOSIS — R76.8 ANA POSITIVE: ICD-10-CM

## 2018-05-02 DIAGNOSIS — M17.0 PRIMARY OSTEOARTHRITIS OF BOTH KNEES: ICD-10-CM

## 2018-05-02 DIAGNOSIS — R09.89 ALTERED TISSUE PERFUSION: Primary | ICD-10-CM

## 2018-05-02 DIAGNOSIS — I75.012: ICD-10-CM

## 2018-05-02 PROCEDURE — 99214 OFFICE O/P EST MOD 30 MIN: CPT | Mod: S$GLB,,, | Performed by: INTERNAL MEDICINE

## 2018-05-02 PROCEDURE — 99999 PR PBB SHADOW E&M-EST. PATIENT-LVL IV: CPT | Mod: PBBFAC,,, | Performed by: INTERNAL MEDICINE

## 2018-05-02 RX ORDER — GABAPENTIN 300 MG/1
300 CAPSULE ORAL 2 TIMES DAILY
COMMUNITY
End: 2018-05-30

## 2018-05-02 ASSESSMENT — ROUTINE ASSESSMENT OF PATIENT INDEX DATA (RAPID3)
MDHAQ FUNCTION SCORE: .5
TOTAL RAPID3 SCORE: 3.22
PSYCHOLOGICAL DISTRESS SCORE: 1.1
PAIN SCORE: 3.5
AM STIFFNESS SCORE: 0, NO
FATIGUE SCORE: 2.5
PATIENT GLOBAL ASSESSMENT SCORE: 4.5

## 2018-05-02 NOTE — PROGRESS NOTES
"Subjective:       Patient ID: Shanda Shaffer is a 63 y.o. female.    Chief Complaint: Disease Management      HPI   62YF with hx HTN, HLD, GERD ( EGD showing ulcers per pt on PPI by Dr Olivo @ [facility]), OA of b/l knees s/p joint replacement b/l referred by Dr Araya for TOYA+ve in the setting of atheroembolism.  Since last visit, pt was started on trial of anticoagulation due to worsening pain and more 2nd digit discoloration with ulceration and pt reported that she had noted some improvement in the discoloration of 2nd finger when she had received heparin briefly in the ED. Pt was on anticoagulation ( lovenox for 3 months) and discontinued on April 19th, pt is still currently on sildenafil, being managed by PCP.      Pt was seen by Hand surgeon ( Dr Cintia Villalta @ [facility]) about 1 week ago who told her that it might take about 6 weeks for the scab to fall off and looks like it is healing. Pt reports 1 episode of L 4th finger turning white briefly and returning to normal with no other current episode and not associated with any triggers. Notices some temperature difference where some digits of the affected hand feels cooler compared to other digits.    Pt denies fever, chills, CP, SOB, abd pain, changes in bowel/bladder habits, oral/genital ulcers, skin rash, alopecia, joint swelling/pain, new skin lesions.     Initial history  62YF with hx HTN, HLD, GERD ( EGD showing ulcers per pt on PPI by Dr Olivo @ [facility]), OA of b/l knees s/p joint replacement b/l referred by Dr Araya for TOYA+ve in the setting of atheroembolism. Pt reports R 2nd digit which started off as a " hang nail" and became gangrenous seen by Vascular surgery ? Cholesterol embolism. Amputation on Oct 4th 2017. Workup done TOYA 1: 1280 with neg profile, CTA chest with mild atherosclerosis of the aortic arch, no dissection or aneurysms, no LAD and nodular opacity 0.8cm in KSENIA pleura. 2D ECHO done 09/17 showed no evidence of vegetations " "or thrombi. EF 60-65%.  Pt reported around Dec 15th she developed "silver on 2nd digit and hang nail on 3rd digit L hand" now cold on the tips, discolored and painful to touch  Pt is currently on ASA. Seen by Hand surgeon Dr Thorne and started on Levaquin for changes on L hand. Seen by Hematology Dr Reaves who has ordered CTA of L upper extremity for further evaluation of ischemic digits    + weight loss 10lb after amputation of the R digit which she attributed to pain meds causing loss of appetite. Pt reports an episode of finger turning white on her 4th digit on R hand. 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 AM stiffness for about 15 mins.   Pt denies fatigue, oral/nasal/genital ulcers, headaches, fever, chills, n/v, abd pain, CP, SOB, dysphagia, hemoptysis, recent travel, changes in bowel/bladder habits, pleurisy, pericarditis, vision changes,tight skin, thromoboses, photosensitivity, skin rash, raynauds, alopecia, joint swelling or erythema.    family history of autoimmune disease : Niece SLE ,Brother RA    Works as  in [employer]Sexual active with  of 30yrs, no other contacts. hx of miscarriage X1 in the 1st trimester  1 . Had 4 children with no complications.    Used to smoke 1pk/dayX20 yrs quit in . Used to use THC and snort cocaine ~ 20yrs ago not anymore.     On previous visit. Pt was seen by Hand surgeon who recommended another opinion from another vascular surgery. Pt stated that he did not have anything to add and gave her a script for Nifedipine which she did not take. Pt reports that her  She was seen by PCP for worsening symptoms and sent to the ED 18 and was seen by Vascular Surgery who stated "likely etiology is cholesterol embolus. If this worsens and progresses, unfortunately finger tip amputation may be inevitable".  Pt states that when she received heparin in the ED, she noted an improvement in the " "discoloration of her finger.  Pt was crying during encounter and was really upset regarding her finger and watching it " die".    Past Medical History:   Diagnosis Date    Acne vulgaris 2015    Benign hypertension 2015    Bladder prolapse, female, acquired 2015    Diverticulosis of large intestine without hemorrhage 2015    Esophageal stricture 2015    Former smoker: 1 pack a day for 30 years, quit 3/11/10 3/17/2017    Gastroesophageal reflux disease without esophagitis 2015    Hyperlipidemia     Non morbid obesity due to excess calories 2015    Primary osteoarthritis of both knees 2015     Past Surgical History:   Procedure Laterality Date    bilateral knee surgery  2016    BREAST SURGERY      cyst removal     SECTION      x 4    HAND SURGERY      JOINT REPLACEMENT      KNEE SURGERY Bilateral 2016    TK    TONSILLECTOMY       Family History   Problem Relation Age of Onset    Diabetes Mother     Heart disease Mother     Arthritis Mother     Hypertension Father     Heart disease Father      PPM    Heart attack Father     Diabetes Sister     Heart disease Sister      valve replacement    Diabetes Brother     Arthritis Brother      RA    No Known Problems Son     Cancer Maternal Grandmother      breast    Stroke Maternal Grandmother     No Known Problems Son     No Known Problems Son     No Known Problems Son     Heart failure Neg Hx     Hyperlipidemia Neg Hx     Asthma Neg Hx     Emphysema Neg Hx          Current Outpatient Prescriptions on File Prior to Visit   Medication Sig Dispense Refill    amLODIPine (NORVASC) 10 MG tablet Take 1 tablet (10 mg total) by mouth once daily. 30 tablet 3    aspirin (ECOTRIN) 81 MG EC tablet Take 2 tablets (162 mg total) by mouth once daily. 60 tablet 11    diclofenac sodium 1 % Gel Apply 2 g topically once daily.      estradiol (ESTRING) 2 mg (7.5 mcg /24 hour) vaginal ring " "Place 2 mg vaginally every 3 (three) months.      losartan-hydrochlorothiazide 100-25 mg (HYZAAR) 100-25 mg per tablet Take 1 tablet by mouth once daily.      pantoprazole (PROTONIX) 40 mg GrPS Take 40 mg by mouth once daily.      rosuvastatin (CRESTOR) 20 MG tablet Take 1 tablet (20 mg total) by mouth every evening. 90 tablet 3    sildenafil, antihypertensive, (REVATIO) 20 mg Tab Take 1 tablet (20 mg total) by mouth 2 (two) times daily with meals. 60 tablet 11    trospium (SANCTURA) 20 mg Tab tablet Take 1 tablet (20 mg total) by mouth 2 (two) times daily. 60 tablet 0     No current facility-administered medications on file prior to visit.      Review of patient's allergies indicates:  No Known Allergies    Review of Systems   Constitutional: Negative for chills.   HENT: Negative for congestion and mouth sores.    Eyes: Negative for visual disturbance.   Respiratory: Negative for cough and chest tightness.    Cardiovascular: Negative for chest pain, palpitations and leg swelling.   Gastrointestinal: Negative for abdominal pain, constipation, diarrhea, nausea and vomiting.   Endocrine: Negative for polyuria.   Genitourinary: Negative for difficulty urinating, frequency, hematuria and urgency.   Musculoskeletal: Negative for arthralgias and back pain.   Skin: Positive for color change. Negative for rash.   Neurological: Negative for dizziness, tremors and numbness.   Hematological: Bruises/bleeds easily.   Psychiatric/Behavioral: Negative for decreased concentration. The patient is not nervous/anxious.          Objective:   /80   Pulse 69   Ht 5' 4" (1.626 m)   Wt 85 kg (187 lb 8 oz)   BMI 32.18 kg/m²      Physical Exam   Constitutional: She is oriented to person, place, and time and well-developed, well-nourished, and in no distress.   HENT:   Head: Normocephalic and atraumatic.   No oral ulcers  Swollen glands   Eyes: EOM are normal. Pupils are equal, round, and reactive to light.   Neck: Normal " range of motion. Neck supple.   No carotid or subclavian bruits     Cardiovascular: Normal rate and regular rhythm.    Pulmonary/Chest: Effort normal and breath sounds normal.   Abdominal: Soft. Bowel sounds are normal. She exhibits no distension. There is no tenderness.       Right Side Rheumatological Exam     Examination finds the shoulder, elbow, wrist, knee, 1st PIP, 1st MCP, 2nd PIP, 2nd MCP, 3rd PIP, 3rd MCP, 4th PIP, 4th MCP, 5th PIP and 5th MCP normal.    Left Side Rheumatological Exam     Examination finds the shoulder, elbow, wrist, knee, 1st PIP, 1st MCP, 2nd PIP, 2nd MCP, 3rd PIP, 3rd MCP, 4th PIP, 4th MCP, 5th PIP and 5th MCP normal.      Lymphadenopathy:     She has no cervical adenopathy.   Neurological: She is alert and oriented to person, place, and time.   Skin: Skin is warm and dry. No rash noted. No erythema. No pallor.     No nail pitting  Healing/scab formation and necrosis of the distal L 2nd digit   No skin tightening/puffy hands     Psychiatric: Affect normal.   Musculoskeletal: Normal range of motion. She exhibits no edema, tenderness or deformity.   2+ radial pulses b/l  Posterior tibial pulses 1+ on RLE  DP 2+ on LLE           Results for GLADYS FOLEY (MRN [id number]) as of 1/24/2018 16:52   Ref. Range 1/19/2018 11:42   WBC Latest Ref Range: 3.90 - 12.70 K/uL 7.12   RBC Latest Ref Range: 4.00 - 5.40 M/uL 4.40   Hemoglobin Latest Ref Range: 12.0 - 16.0 g/dL 13.5   Hematocrit Latest Ref Range: 37.0 - 48.5 % 39.9   MCV Latest Ref Range: 82 - 98 fL 91   MCH Latest Ref Range: 27.0 - 31.0 pg 30.7   MCHC Latest Ref Range: 32.0 - 36.0 g/dL 33.8   RDW Latest Ref Range: 11.5 - 14.5 % 12.4   Platelets Latest Ref Range: 150 - 350 K/uL 333   MPV Latest Ref Range: 9.2 - 12.9 fL 8.8 (L)   Gran% Latest Ref Range: 38.0 - 73.0 % 68.0   Gran # Latest Ref Range: 1.8 - 7.7 K/uL 4.8   Immature Granulocytes Latest Ref Range: 0.0 - 0.5 % 0.4   Immature Grans (Abs) Latest Ref Range: 0.00 - 0.04 K/uL 0.03    Lymph% Latest Ref Range: 18.0 - 48.0 % 22.8   Lymph # Latest Ref Range: 1.0 - 4.8 K/uL 1.6   Mono% Latest Ref Range: 4.0 - 15.0 % 6.3   Mono # Latest Ref Range: 0.3 - 1.0 K/uL 0.5   Eosinophil% Latest Ref Range: 0.0 - 8.0 % 2.1   Eos # Latest Ref Range: 0.0 - 0.5 K/uL 0.2   Basophil% Latest Ref Range: 0.0 - 1.9 % 0.4   Baso # Latest Ref Range: 0.00 - 0.20 K/uL 0.03   nRBC Latest Ref Range: 0 /100 WBC 0     Results for BETTYKENNYGLADYS FELIZ VAIBHAV (MRN [id number]) as of 1/24/2018 16:52   Ref. Range 1/19/2018 11:42   Sodium Latest Ref Range: 136 - 145 mmol/L 138   Potassium Latest Ref Range: 3.5 - 5.1 mmol/L 3.6   Chloride Latest Ref Range: 95 - 110 mmol/L 102   CO2 Latest Ref Range: 23 - 29 mmol/L 27   Anion Gap Latest Ref Range: 8 - 16 mmol/L 9   BUN, Bld Latest Ref Range: 8 - 23 mg/dL 15   Creatinine Latest Ref Range: 0.5 - 1.4 mg/dL 0.8   eGFR if non African American Latest Ref Range: >60 mL/min/1.73 m^2 >60.0   eGFR if African American Latest Ref Range: >60 mL/min/1.73 m^2 >60.0   Glucose Latest Ref Range: 70 - 110 mg/dL 102   Calcium Latest Ref Range: 8.7 - 10.5 mg/dL 9.7   Alkaline Phosphatase Latest Ref Range: 55 - 135 U/L 77   Total Protein Latest Ref Range: 6.0 - 8.4 g/dL 7.2   Albumin Latest Ref Range: 3.5 - 5.2 g/dL 3.8   Total Bilirubin Latest Ref Range: 0.1 - 1.0 mg/dL 0.5   AST Latest Ref Range: 10 - 40 U/L 23   ALT Latest Ref Range: 10 - 44 U/L 28       Results for OGIZZYA VAIBHAV (MRN [id number]) as of 1/3/2018 17:12   Ref. Range 12/20/2017 17:06   TOYA HEP-2 Titer Unknown Positive 1:1280 C...   Anti-SSA Antibody Latest Ref Range: 0.00 - 19.99 EU 1.06   Anti-SSA Interpretation Latest Ref Range: Negative  Negative   Anti-SSB Antibody Latest Ref Range: 0.00 - 19.99 EU 0.00   Anti-SSB Interpretation Latest Ref Range: Negative  Negative   ds DNA Ab Latest Ref Range: Negative 1:10  Negative 1:10   Anti Sm Antibody Latest Ref Range: 0.00 - 19.99 EU 0.58   Anti-Sm Interpretation Latest Ref Range: Negative  Negative    Anti Sm/RNP Antibody Latest Ref Range: 0.00 - 19.99 EU 0.72   Anti-Sm/RNP Interpretation Latest Ref Range: Negative  Negative   Results for GLADYS FOLEY (MRN [id number]) as of 1/3/2018 17:12   Ref. Range 12/20/2017 17:06 1/3/2018 13:38   CCP Antibodies Latest Ref Range: <5.0 U/mL  <0.5   Rheumatoid Factor Latest Ref Range: 0.0 - 15.0 IU/mL 12.0    Results for GLADYS FOLEY (MRN [id number]) as of 1/24/2018 16:52   Ref. Range 1/3/2018 13:38   CCP Antibodies Latest Ref Range: <5.0 U/mL <0.5       Results for GLADYS FOLEY (MRN [id number]) as of 1/3/2018 17:12   Ref. Range 12/26/2017 16:14   Complement (C-3) Latest Ref Range: 50 - 180 mg/dL 127   Complement (C-4) Latest Ref Range: 11 - 44 mg/dL 20     Results for GLADYS FOLEY (MRN [id number]) as of 1/24/2018 16:52   Ref. Range 1/3/2018 13:38   Complement,Total, Serum Latest Ref Range: 42 - 95 U/mL 63     Results for GLADYS FOLEY (MRN [id number]) as of 1/24/2018 16:52   Ref. Range 1/3/2018 13:38   Cytoplasmic Neutrophilic Ab Latest Ref Range: <1:20 Titer <1:20   Perinuclear (P-ANCA) Latest Ref Range: <1:20 Titer <1:20   ANCA Proteinase 3 Latest Ref Range: <0.4 (Negative) U <0.2   MPO Latest Ref Range: <=20 UNITS 3     Results for GLADYS FOLEY (MRN [id number]) as of 1/24/2018 16:52   Ref. Range 12/26/2017 16:14   Cryoglobulin, Qualitative Latest Ref Range: Absent  Absent       Results for GLADYS FOLEY (MRN [id number]) as of 1/24/2018 16:52   Ref. Range 1/3/2018 13:16   Specimen UA Unknown Urine, Unspecified   Color, UA Latest Ref Range: Yellow, Straw, Litzy  Straw   pH, UA Latest Ref Range: 5.0 - 8.0  7.0   Specific Gravity, UA Latest Ref Range: 1.005 - 1.030  1.010   Appearance, UA Latest Ref Range: Clear  Clear   Protein, UA Latest Ref Range: Negative  Negative   Glucose, UA Latest Ref Range: Negative  Negative   Ketones, UA Latest Ref Range: Negative  Negative   Occult Blood UA Latest Ref Range: Negative  Negative   Nitrite, UA Latest Ref Range:  Negative  Negative   Urobilinogen, UA Latest Ref Range: <2.0 EU/dL Negative   Bilirubin (UA) Latest Ref Range: Negative  Negative   Leukocytes, UA Latest Ref Range: Negative  Negative   Prot/Creat Ratio, Ur Latest Ref Range: 0.00 - 0.20  Unable to calculate   Protein, Urine Random Latest Ref Range: 0 - 15 mg/dL <7         Results for GLADYS FOLEY (MRN [id number]) as of 1/24/2018 16:52   Ref. Range 1/3/2018 13:16   Benzodiazepines Unknown Negative   Methadone metabolites Unknown Negative   Phencyclidine Unknown Negative   Cocaine (Metab.) Unknown Negative   Opiate Scrn, Ur Unknown Negative   Barbiturate Screen, Ur Unknown Negative   Amphetamine Screen, Ur Unknown Negative   Marijuana (THC) Metabolite Unknown Negative       SPEP  Normal total protein, normal pattern.     5/2/18 02/21/18                  Clinic visit 01/24/18                          Previous clinic visit pictures             Previous R 2nd digit prior to amputation          CT chest 02/02/18  Findings:    Base of Neck:  No significant abnormality.     Thoracic Soft tissue:  No significant abnormality.     Aorta: There is a left-sided aortic arch with three branch vessels.  Normal in course and caliber, with moderate atherosclerosis.      Heart: The heart is not enlarged. There is no evidence of pericardial effusion. There is calcific atherosclerosis of the coronary vessels.    Pulmonary vasculature: Pulmonary arteries distribute normally.  There are four pulmonary veins. Systemic and pulmonary venoatrial connections are concordant.     Carisa/Mediastinum: No lymphadenopathy..     Esophagus: Small hiatal hernia..     Upper Abdomen: Left renal cyst which is incompletely evaluated on today's examination.     Airways: Patent. 2  Lungs/Pleura: Resolution of patchy groundglass opacification within the anterior right upper lobe and to a lesser extent anterior left upper lobe identified on patient's recent CTA upper extremity likely  representing dependent atelectasis.  Calcified granuloma within the left upper lobe measuring 1.0 cm and several other smaller calcified granulomas bilaterally.  Symmetrically expanded, without  consolidation, pneumothorax, mass, or significant pleural thickening.  There is no pleural effusion.     Bones: No acute fracture. Age appropriate degenerative changes.    Resolution of patchy groundglass opacification within the anterior right upper lobe and to a lesser extent anterior left upper lobe which was identified on patient's recent CTA upper extremity 01/10/2018 which likely represented dependent atelectasis.    Calcific atherosclerosis of the aorta and coronary vessels.    Left renal cyst incompletely evaluated on today's examination.      US L arm 01/08/17  Color flow duplex reveals a patent left upper extremity arterial tree with no evidence of a hemodynamically significant stenosis.    CTA chest 01/10/18  No evidence of vascular occlusion to the level of the wrist. Recommend referral to interventional radiology for angiogram if there is concern for patency of the distal vessels of the hand.  Mild calcific atherosclerosis of the subclavian artery origin.  Right upper lobe groundglass consolidation possibly infectious or inflammatory      CTA chest 08/17  Examination of the soft tissue and vascular structures at the base of the neck is unremarkable.  There is a left-sided aortic arch with 3 branch vessels.  The thoracic aorta maintains normal caliber, contour, and course with mild atherosclerotic calcification.  There is no evidence of aneurysmal dilation or dissection.    The pulmonary arteries distribute normally without evidence of filling defect to indicate pulmonary thromboembolism.      The heart is not enlarged.  No pericardial effusion.    There is no axillary, mediastinal, or hilar lymph node enlargement.      The esophagus maintains a normal course and caliber.  There is a small hiatal  hernia.    Limited images of the upper abdomen obtained during the course of this dedicated thoracic CT demonstrate nothing unusual.  Incidental note is made of splenules.    The osseous structures demonstrate age-related degenerative change.    The trachea and proximal airways are patent.    The lungs are symmetrically expanded with limited pulmonary parenchymal evaluation secondary to motion artifact on this study with technique optimized for pulmonary arterial evaluation.  Bibasilar dependent atelectasis. No evidence of consolidation, pneumothorax, mass, or significant pleural thickening.  There is a 0.8 cm nodular opacity abutting the pleura in the apicoposterior segment of the LEFT upper lobe with central calcification likely representing granulomatous disease.  Additionally, there are few calcified pulmonary micronodules bilaterally.  There is no evidence of pleural fluid.    Xray Knee 12/20/17  Right: There is a TKA in place alignment no complication  Left: There is a TKA in place with good alignment and no complication    2D ECHO 09/17  CONCLUSIONS     1 - Normal left ventricular systolic function (EF 60-65%).     2 - Normal right ventricular systolic function .     3 - Normal left ventricular diastolic function.     4 - Mild tricuspid regurgitation.     5 - The estimated PA systolic pressure is 29 mmHg.     6 - No evidence of intracardiac shunt      Assessment:       1. Altered tissue perfusion    2. TOYA positive    3. Primary osteoarthritis of both knees    4. Benign hypertension    5. Atheroembolism of finger, left            Plan:         Shanda was seen today for disease management.    Diagnoses and all orders for this visit:    Altered tissue perfusion  All rheumatology workup is negative for cause of digit ischemia.  Low suspicion for autoimmune cause for pt's symptoms.  HIV, Hep,cryo, Quantiferon gold TB neg, RPR neg, ANCA, MPO,PR3 neg, Utox is negative. No BP asymmetry, minimally elevated  inflammatory marker ESR 22, now normalized and normal CRP, normal albumin makes chronic inflammatory process less likely. 2D ECHO shows EF 60-65%, no thrombi/vegetations, no effusions or shunts.US of L arm done 01/18 showed a patent left upper extremity arterial tree with no evidence of a hemodynamically significant stenosis. CTA L arm 01/18 shows no evidence of vascular occlusion to the level of the wrist although did show RUL groundglass consolidation. CXR was obtained which showed a 0.8 cm nodule with central calcification in the posterior aspect of the apicoposterior segment of the LEFT upper lobe consistent with granuloma with no evidence of consolidation. Subsequent CT chest 02/02/18 was obtained which showed resolution of patchy groundglass opacification,calcific atherosclerosis of the aorta and coronary vessels,calcified granuloma within the left upper lobe measuring 1.0 cm and several other smaller calcified granulomas bilaterally. Pt seen by pulmonary.     Pt was seen by Vascular surgery who thinks symptoms likely related to cholesterol emboli and no surgical intervention needed.  No indication for steroids based on workup. Pt reported some improvement in her fingers when she received anticoagulation (heparin) while in the ED 01/19/17, and we decided with assistance of PCP on trial of anticoagulation which she did for about 3 months and has discontinued since April 19th  which she felt helped her symptoms. Pt was also started on Revatio by PCP which she is still currently taking.   Continue follow up with hand/Vascular surgery and PCP.        TOYA positive  TOYA 1:1280 centromere with negative profile. Normal complements. APS workup negative. CBC shows no cytopenias or eosinophilia. metabolic profile shows normal liver and renal function. ESR 11, CRP 0.9. SPEP wnl. Hypercoagulable workup negative. Pt does not meet SLICC criteria for SLE based on symptoms and labs.  No evidence of synovitis on exam, RF, ACPA  neg. Low suspicion for inflammatory arthritis, CREST, scleroderma. CTA chest showing mild atherosclerosis,nodular opacity 0.8cm pleura KSENIA with no evidence of aortic aneurysmal dilation or dissection, no LAD.   Family history of autoimmune disease (brother with RA and niece with SLE) could explain + TOYA.     Atheroembolism of  L finger  Mild atherosclerosis subclavian on CTA L arm noted.   Continue ASA and statin  See above #   F/u Vascular surgery and hand surgery      Benign hypertension  BP stable.   Continue amlodipine  F/u PCP     At this time, no evidence to suggest an underlying active autoimmune process for pt's symptoms and pt is improving without any immunosuppressive therapy.      RTC prn

## 2018-05-03 RX ORDER — AMLODIPINE BESYLATE 10 MG/1
TABLET ORAL
Qty: 30 TABLET | Refills: 3 | Status: SHIPPED | OUTPATIENT
Start: 2018-05-03 | End: 2018-09-16 | Stop reason: SDUPTHER

## 2018-05-04 NOTE — PROGRESS NOTES
I have personally taken the history and examined the patient to verify the fellow's notation, and I agree with the impression and plan stated.

## 2018-05-14 ENCOUNTER — RESEARCH ENCOUNTER (OUTPATIENT)
Dept: RESEARCH | Facility: HOSPITAL | Age: 63
End: 2018-05-14

## 2018-05-14 ENCOUNTER — OFFICE VISIT (OUTPATIENT)
Dept: ORTHOPEDICS | Facility: CLINIC | Age: 63
End: 2018-05-14
Payer: COMMERCIAL

## 2018-05-14 VITALS — HEIGHT: 64 IN | BODY MASS INDEX: 31.92 KG/M2 | WEIGHT: 187 LBS

## 2018-05-14 DIAGNOSIS — M75.42 IMPINGEMENT SYNDROME OF LEFT SHOULDER: ICD-10-CM

## 2018-05-14 PROCEDURE — 99213 OFFICE O/P EST LOW 20 MIN: CPT | Mod: S$GLB,,, | Performed by: ORTHOPAEDIC SURGERY

## 2018-05-14 PROCEDURE — 99999 PR PBB SHADOW E&M-EST. PATIENT-LVL II: CPT | Mod: PBBFAC,,, | Performed by: ORTHOPAEDIC SURGERY

## 2018-05-14 RX ORDER — CELECOXIB 200 MG/1
200 CAPSULE ORAL DAILY
Qty: 30 CAPSULE | Refills: 3 | Status: SHIPPED | OUTPATIENT
Start: 2018-05-14 | End: 2018-05-30

## 2018-05-14 NOTE — PROGRESS NOTES
HISTORY OF PRESENT ILLNESS:  Ms. Shaffer in followup of left shoulder symptoms.    She also has some peripheral vascular problems in her hand.  She was treated   by Dr. Angel for that.    Regarding the left shoulder, symptoms are a little bit better since the   injection.  She also started on some therapy, which may be helping a bit, but   she is still having some pain.    PHYSICAL EXAMINATION:  LEFT SHOULDER:  No tenderness, no swelling.  Range of motion full.  Positive   impingement sign.  Positive supraspinatus stress test.  NEUROLOGIC:  Intact.    PLAN:  I think we should go ahead and get an MRI scan ordered left shoulder to   check the rotator cuff.  I would like her to continue therapy, but avoid any   heavy overhead lifting and I have started her on Celebrex by mouth one a day.    Follow up after the MRI is complete.      JULEE  dd: 05/14/2018 15:26:22 (CDT)  td: 05/15/2018 10:47:50 (CDT)  Doc ID   #[id number]  Job ID #[id number]    CC:

## 2018-05-29 ENCOUNTER — TELEPHONE (OUTPATIENT)
Dept: INTERNAL MEDICINE | Facility: CLINIC | Age: 63
End: 2018-05-29

## 2018-05-29 NOTE — TELEPHONE ENCOUNTER
Spoke to patient, advised she will need an appt. Offered to schedule with another provider if she felt she needed to be seen today, patient declined. Made appt for tomorrow. Advised pt to go to ER if she felt this was a medical emergency.

## 2018-05-29 NOTE — TELEPHONE ENCOUNTER
----- Message from Madeline Mendoza sent at 5/29/2018 12:59 PM CDT -----  Contact: self / [phone]  Patient is requesting a call back regarding, she is retaining water, in her ankles Please advise

## 2018-05-30 ENCOUNTER — OFFICE VISIT (OUTPATIENT)
Dept: INTERNAL MEDICINE | Facility: CLINIC | Age: 63
End: 2018-05-30
Payer: COMMERCIAL

## 2018-05-30 VITALS
DIASTOLIC BLOOD PRESSURE: 66 MMHG | SYSTOLIC BLOOD PRESSURE: 132 MMHG | WEIGHT: 199.69 LBS | BODY MASS INDEX: 34.09 KG/M2 | OXYGEN SATURATION: 98 % | HEART RATE: 74 BPM | HEIGHT: 64 IN

## 2018-05-30 DIAGNOSIS — R09.89 ALTERED TISSUE PERFUSION: ICD-10-CM

## 2018-05-30 DIAGNOSIS — R63.5 ABNORMAL WEIGHT GAIN: ICD-10-CM

## 2018-05-30 DIAGNOSIS — E78.2 MIXED HYPERLIPIDEMIA: ICD-10-CM

## 2018-05-30 DIAGNOSIS — R60.9 EDEMA, UNSPECIFIED TYPE: Primary | ICD-10-CM

## 2018-05-30 DIAGNOSIS — Z12.31 ENCOUNTER FOR SCREENING MAMMOGRAM FOR BREAST CANCER: ICD-10-CM

## 2018-05-30 DIAGNOSIS — I10 BENIGN HYPERTENSION: ICD-10-CM

## 2018-05-30 PROBLEM — N32.81 OAB (OVERACTIVE BLADDER): Status: ACTIVE | Noted: 2018-05-30

## 2018-05-30 PROCEDURE — 99213 OFFICE O/P EST LOW 20 MIN: CPT | Mod: S$GLB,,, | Performed by: INTERNAL MEDICINE

## 2018-05-30 PROCEDURE — 99999 PR PBB SHADOW E&M-EST. PATIENT-LVL III: CPT | Mod: PBBFAC,,, | Performed by: INTERNAL MEDICINE

## 2018-05-30 RX ORDER — FUROSEMIDE 20 MG/1
20 TABLET ORAL 2 TIMES DAILY
Qty: 3 TABLET | Refills: 0 | Status: SHIPPED | OUTPATIENT
Start: 2018-05-30 | End: 2018-09-06

## 2018-05-30 NOTE — PROGRESS NOTES
"    Subjective:      Patient ID: Shanda Shaffer is a 63 y.o. female.    Chief Complaint: Ankle Swelling    HPI: 63 y.o. White female,  Noticed that her ankles were swollen 5 days ago.  No pain in them.  No SOB,CP,NUNN,palpitations.  No increased salt intake.  Has gained 14# since last visit.  Her left index finger eschar has fallen off, minor scar.      Review of Systems   Constitutional: Positive for appetite change.   HENT: Negative.    Eyes: Negative.    Respiratory: Negative.    Cardiovascular: Negative.    Gastrointestinal: Negative.    Endocrine: Negative.    Genitourinary: Negative.    Musculoskeletal: Positive for joint swelling.        Ankles swollen   Skin: Negative.    Allergic/Immunologic: Negative.    Neurological: Negative.    Hematological: Negative.    Psychiatric/Behavioral: Negative.        Objective:   /66   Pulse 74   Ht 5' 4" (1.626 m)   Wt 90.6 kg (199 lb 11.2 oz)   SpO2 98%   BMI 34.28 kg/m²     Physical Exam   Constitutional: She is oriented to person, place, and time. She appears well-developed and well-nourished.   HENT:   Head: Normocephalic and atraumatic.   Right Ear: External ear normal.   Left Ear: External ear normal.   Nose: Nose normal.   Mouth/Throat: Oropharynx is clear and moist.   Eyes: Conjunctivae are normal.   Neck: Normal range of motion. Neck supple. No JVD present.   Cardiovascular: Normal rate, regular rhythm and normal heart sounds.    Pulmonary/Chest: Effort normal and breath sounds normal.   Abdominal: Soft. Bowel sounds are normal.   Musculoskeletal:   See photo   Neurological: She is alert and oriented to person, place, and time.   Skin: Skin is warm and dry.   Psychiatric: She has a normal mood and affect. Her behavior is normal. Judgment and thought content normal.       Assessment:     1. Edema, unspecified type    2. Altered tissue perfusion , we have been able to save the phalanx.   3. Benign hypertension    4. Mixed hyperlipidemia    5. Encounter " for screening mammogram for breast cancer      Plan:     Edema, unspecified type  -     furosemide (LASIX) 20 MG tablet; Take 1 tablet (20 mg total) by mouth 2 (two) times daily.  Dispense: 3 tablet; Refill: 0  -     Urinalysis; Future; Expected date: 05/30/2018    Altered tissue perfusion  Discontinue sildenafil.  Benign hypertension  -     Comprehensive metabolic panel; Future; Expected date: 05/30/2018  -     CBC auto differential; Future; Expected date: 05/30/2018    Mixed hyperlipidemia  -     Lipid panel; Future; Expected date: 05/30/2018    Encounter for screening mammogram for breast cancer  -     Mammo Digital Screening Bilat with CAD; Future; Expected date: 05/30/2018    Abnormal weight gain    loose weight, low sallt diet.

## 2018-05-31 ENCOUNTER — PATIENT MESSAGE (OUTPATIENT)
Dept: INTERNAL MEDICINE | Facility: CLINIC | Age: 63
End: 2018-05-31

## 2018-06-14 DIAGNOSIS — I73.9 PVD (PERIPHERAL VASCULAR DISEASE): Primary | ICD-10-CM

## 2018-06-18 ENCOUNTER — HOSPITAL ENCOUNTER (OUTPATIENT)
Dept: VASCULAR SURGERY | Facility: CLINIC | Age: 63
Discharge: HOME | End: 2018-06-18
Attending: SURGERY
Payer: COMMERCIAL

## 2018-06-18 ENCOUNTER — OFFICE VISIT (OUTPATIENT)
Dept: VASCULAR SURGERY | Facility: CLINIC | Age: 63
End: 2018-06-18
Payer: COMMERCIAL

## 2018-06-18 ENCOUNTER — PATIENT MESSAGE (OUTPATIENT)
Dept: VASCULAR SURGERY | Facility: CLINIC | Age: 63
End: 2018-06-18

## 2018-06-18 VITALS
BODY MASS INDEX: 32.37 KG/M2 | SYSTOLIC BLOOD PRESSURE: 135 MMHG | TEMPERATURE: 98 F | HEART RATE: 68 BPM | WEIGHT: 189.63 LBS | HEIGHT: 64 IN | DIASTOLIC BLOOD PRESSURE: 81 MMHG

## 2018-06-18 DIAGNOSIS — M79.645 PAIN OF FINGER OF LEFT HAND: Primary | ICD-10-CM

## 2018-06-18 DIAGNOSIS — I73.9 PVD (PERIPHERAL VASCULAR DISEASE): ICD-10-CM

## 2018-06-18 PROCEDURE — 93923 UPR/LXTR ART STDY 3+ LVLS: CPT | Mod: S$GLB,,, | Performed by: SURGERY

## 2018-06-18 PROCEDURE — 99214 OFFICE O/P EST MOD 30 MIN: CPT | Mod: S$GLB,,, | Performed by: SURGERY

## 2018-06-18 PROCEDURE — 99999 PR PBB SHADOW E&M-EST. PATIENT-LVL III: CPT | Mod: PBBFAC,,, | Performed by: SURGERY

## 2018-06-18 NOTE — PROGRESS NOTES
Shanda Shaffer  2018  Referred by: Dr. Cheema at Urgent Care  HPI:   Patient is a 63 y.o. female with HTN and HLD who is here today for f/u of right 2nd finger discoloration - began in Aug 2017 --- underwent R 2nd digit tip amputation.  In Aug 2017, she went to urgent care yesterday and was told to take ASA and take pain meds as needed and come to vascular appointment today.  She is not taking a statin.  She denies history of autoimmune problems.  She had bilateral TKA 18 months ago.    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[employer]    PMD is Dr Cintia Villalta ()  PMD is Dr Ad Bassett    Past Medical History:   Diagnosis Date    Acne vulgaris 2015    Benign hypertension 2015    Bladder prolapse, female, acquired 2015    Diverticulosis of large intestine without hemorrhage 2015    Esophageal stricture 2015    Former smoker: 1 pack a day for 30 years, quit 3/11/10 3/17/2017    Gastroesophageal reflux disease without esophagitis 2015    Hyperlipidemia     Non morbid obesity due to excess calories 2015    Primary osteoarthritis of both knees 2015     Past Surgical History:   Procedure Laterality Date    bilateral knee surgery  2016     SECTION      x 4    HAND SURGERY      JOINT REPLACEMENT      KNEE SURGERY Bilateral 2016    TK    TONSILLECTOMY       Family History   Problem Relation Age of Onset    Diabetes Mother     Heart disease Mother     Arthritis Mother     Hypertension Father     Heart disease Father         PPM    Heart attack Father     Diabetes Sister     Heart disease Sister         valve replacement    Diabetes Brother     Arthritis Brother          RA    No Known Problems Son     Cancer Maternal Grandmother         breast    Stroke Maternal Grandmother     No Known Problems Son     No Known Problems Son     No Known Problems Son     Heart failure Neg Hx     Hyperlipidemia Neg Hx     Asthma Neg Hx     Emphysema Neg Hx      Social History     Social History    Marital status:      Spouse name: N/A    Number of children: 4    Years of education: N/A     Occupational History    Not on file.     Social History Main Topics    Smoking status: Former Smoker     Packs/day: 0.50     Years: 30.00     Types: Cigarettes     Quit date: 3/11/2010    Smokeless tobacco: Never Used    Alcohol use Yes      Comment: occasional    Drug use: No    Sexual activity: Not Currently     Other Topics Concern    Not on file     Social History Narrative    No narrative on file     Current Outpatient Prescriptions on File Prior to Visit   Medication Sig    amLODIPine (NORVASC) 10 MG tablet TAKE ONE TABLET BY MOUTH ONCE DAILY    aspirin (ECOTRIN) 81 MG EC tablet Take 2 tablets (162 mg total) by mouth once daily.    diclofenac sodium 1 % Gel Apply 2 g topically once daily.    estradiol (ESTRING) 2 mg (7.5 mcg /24 hour) vaginal ring Place 2 mg vaginally every 3 (three) months.    furosemide (LASIX) 20 MG tablet Take 1 tablet (20 mg total) by mouth 2 (two) times daily.    losartan-hydrochlorothiazide 100-25 mg (HYZAAR) 100-25 mg per tablet Take 1 tablet by mouth once daily.    pantoprazole (PROTONIX) 40 mg GrPS Take 40 mg by mouth once daily.    rosuvastatin (CRESTOR) 20 MG tablet Take 1 tablet (20 mg total) by mouth every evening.    trospium (SANCTURA) 20 mg Tab tablet Take 1 tablet (20 mg total) by mouth 2 (two) times daily.     No current facility-administered medications on file prior to visit.        REVIEW OF SYSTEMS:  General: negative; ENT: negative; Allergy and Immunology: negative; Hematological and Lymphatic: Negative; Endocrine: negative;  Respiratory: no cough, shortness of breath, or wheezing; Cardiovascular: no chest pain or dyspnea on exertion; Gastrointestinal: no abdominal pain/back, change in bowel habits, or bloody stools; Genito-Urinary: no dysuria, trouble voiding, or hematuria; Musculoskeletal: negative  Neurological: no TIA or stroke symptoms    PHYSICAL EXAM:   Right Arm BP - Sittin/77 (18 1609)  Left Arm BP - Sittin/77 (18 1609)  Pulse: 68  Temp: 98.1 °F (36.7 °C)      General appearance:  Alert, well-appearing, and in no distress.  Oriented to person, place, and time   Neurological: Normal speech, no focal findings noted; CN II - XII grossly intact           Musculoskeletal: Digits/nail without cyanosis/clubbing.  Normal muscle strength/tone.                 Neck: Supple, no significant adenopathy; thyroid is not enlarged                  No carotid bruit can be auscultated                Chest:  Clear to auscultation, no wheezes, rales or rhonchi, symmetric air entry     No use of accessory muscles             Cardiac: Normal rate and regular rhythm, S1 and S2 normal; PMI non-displaced          Abdomen: Soft, nontender, nondistended, no masses or organomegaly     No rebound tenderness noted; bowel sounds normal     No Palpable Pulsatile aortic mass.      No groin adenopathy      Extremities:  2+ radial, ulnar pulses bilaterally     Right 2nd digit - healing distal phalanx amputation     L 2nd digit - distal phalanx w cyanosis       2+ femoral pulses bilaterally      2+ pedal pulses palpable.     No pedal edema     No ulcerations    LAB RESULTS:  Lab Results   Component Value Date    K 4.1 06/15/2018    K 3.6 2018    K 3.8 2017    CREATININE 0.67 06/15/2018    CREATININE 0.8 2018    CREATININE 0.9 2017     Lab Results   Component Value Date    WBC 7.72 06/15/2018    WBC 7.12 2018    WBC 8.38 2017    HCT 40.1 06/15/2018    HCT 39.9 2018    HCT 39.5 2017      06/15/2018     01/19/2018     12/26/2017     No results found for: HGBA1C  IMAGING:  PPGs:  L hand: 1st, 5th - normal  4th weak biphasic  L 2nd, 3rd monophasic   R hand: normal    Previous:  CTA chest:  no arch lesions; no innominate, L subclavian or axillary artery lesion    Right Finger PPGs 8/14/17  Right: All digital PPG waveforms within normal limits except for the 2nd digit.  Abnormal flat wave PPG waveforms of the 2nd digit noted.     TTE: EF 65%, no valvular vegetations; normal bubble study    IMP/PLAN:  63 y.o. 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  CTA chest: no inflow lesions; 2+ L radial pulse w negative L hand Hans's test  Continue ASA 81 po bid; statin rx  Hypercoagulable w/u is negative and this is unlikely given she is 61 yo and has never had an embolic event before    Normal perfusion to hands x2; has 2+ radial and ulnar arteries x2  Had + TOYA, though other markers have been negative, Rheum w/u has been otherwise non-reavealing    Has started LMWH trial by rheum and Sildenafil rx; off now since ~ May 2018  No arterial revascularization was needed  F/u w Hand surgeon re: L 2nd digit - healed tip  Stable symptoms for now  F/u in 1 yr w PPGs    Jayce Araya MD FACS  Vascular/Endovascular Surgery

## 2018-06-18 NOTE — PROGRESS NOTES
Shanda Shaffer  06/18/2018  Referred by: Dr. Cheema at Urgent Care  HPI:   Patient is a 63 y.o. female with HTN and HLD who is here today for f/u of right 2nd finger discoloration - began in Aug 2017 --- underwent R 2nd digit tip amputation.  In Aug 2017, she went to urgent care yesterday and was told to take ASA and take pain meds as needed and come to vascular appointment today.  She is not taking a statin.  She denies history of autoimmune problems.  She had bilateral TKA 18 months ago.    Rx w ASA/statin rx    However, in mid-Dec 2017, she developed cyanosis of L 3rd digit - has some mild improvement with LMWH. She still continues to have ischemic changes of the tip of her 1st digit on the left hand, but this is improved markedly from photos taken in Jan-Feb. She was started on LMWH and Sildenafil in Feb and continued on Lovenox until April and Sildenafil until late last month. She denies pain in her hands, but does state her fingers will occasionally look blanched with gradual color return after a few minutes.   Overall she is frustrated by the lack of a clear diagnosis, but encouraged by the improvement with her regimen a few months ago. 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[employer]    PMD is Dr Cintia Villalta ()  PMD is Dr Ad Bassett    Past Medical History:   Diagnosis Date    Acne vulgaris 12/16/2015    Benign hypertension 12/16/2015    Bladder prolapse, female, acquired 12/16/2015    Diverticulosis of large intestine without hemorrhage 12/16/2015    Esophageal stricture 12/16/2015    Former smoker: 1 pack a day for 30 years, quit 3/11/10 3/17/2017    Gastroesophageal reflux disease without esophagitis 12/16/2015    Hyperlipidemia     Non morbid obesity due to excess calories 12/16/2015     Primary osteoarthritis of both knees 2015     Past Surgical History:   Procedure Laterality Date    bilateral knee surgery  2016     SECTION      x 4    HAND SURGERY      JOINT REPLACEMENT      KNEE SURGERY Bilateral 2016    TK    TONSILLECTOMY       Family History   Problem Relation Age of Onset    Diabetes Mother     Heart disease Mother     Arthritis Mother     Hypertension Father     Heart disease Father         PPM    Heart attack Father     Diabetes Sister     Heart disease Sister         valve replacement    Diabetes Brother     Arthritis Brother         RA    No Known Problems Son     Cancer Maternal Grandmother         breast    Stroke Maternal Grandmother     No Known Problems Son     No Known Problems Son     No Known Problems Son     Heart failure Neg Hx     Hyperlipidemia Neg Hx     Asthma Neg Hx     Emphysema Neg Hx      Social History     Social History    Marital status:      Spouse name: N/A    Number of children: 4    Years of education: N/A     Occupational History    Not on file.     Social History Main Topics    Smoking status: Former Smoker     Packs/day: 0.50     Years: 30.00     Types: Cigarettes     Quit date: 3/11/2010    Smokeless tobacco: Never Used    Alcohol use Yes      Comment: occasional    Drug use: No    Sexual activity: Not Currently     Other Topics Concern    Not on file     Social History Narrative    No narrative on file     Current Outpatient Prescriptions on File Prior to Visit   Medication Sig    amLODIPine (NORVASC) 10 MG tablet TAKE ONE TABLET BY MOUTH ONCE DAILY    aspirin (ECOTRIN) 81 MG EC tablet Take 2 tablets (162 mg total) by mouth once daily.    diclofenac sodium 1 % Gel Apply 2 g topically once daily.    estradiol (ESTRING) 2 mg (7.5 mcg /24 hour) vaginal ring Place 2 mg vaginally every 3 (three) months.    furosemide (LASIX) 20 MG tablet Take 1 tablet (20 mg total) by mouth 2 (two)  times daily.    losartan-hydrochlorothiazide 100-25 mg (HYZAAR) 100-25 mg per tablet Take 1 tablet by mouth once daily.    pantoprazole (PROTONIX) 40 mg GrPS Take 40 mg by mouth once daily.    rosuvastatin (CRESTOR) 20 MG tablet Take 1 tablet (20 mg total) by mouth every evening.    trospium (SANCTURA) 20 mg Tab tablet Take 1 tablet (20 mg total) by mouth 2 (two) times daily.     No current facility-administered medications on file prior to visit.        REVIEW OF SYSTEMS:  General: negative; ENT: negative; Allergy and Immunology: negative; Hematological and Lymphatic: Negative; Endocrine: negative; Respiratory: no cough, shortness of breath, or wheezing; Cardiovascular: no chest pain or dyspnea on exertion; Gastrointestinal: no abdominal pain/back, change in bowel habits, or bloody stools; Genito-Urinary: no dysuria, trouble voiding, or hematuria; Musculoskeletal: negative  Neurological: no TIA or stroke symptoms    PHYSICAL EXAM:   Right Arm BP - Sittin/77 (18 1609)  Left Arm BP - Sittin/77 (18 1609)  Pulse: 68  Temp: 98.1 °F (36.7 °C)      General appearance:  Alert, well-appearing, and in no distress.  Oriented to person, place, and time   Neurological: Normal speech, no focal findings noted; CN II - XII grossly intact           Musculoskeletal: Digits/nail without cyanosis/clubbing.  Normal muscle strength/tone.                 Neck: Supple, no significant adenopathy; thyroid is not enlarged                  No carotid bruit can be auscultated                Chest:  Clear to auscultation, no wheezes, rales or rhonchi, symmetric air entry     No use of accessory muscles             Cardiac: Normal rate and regular rhythm, S1 and S2 normal; PMI non-displaced          Abdomen: Soft, nontender, nondistended, no masses or organomegaly     No rebound tenderness noted; bowel sounds normal     No Palpable Pulsatile aortic mass.      No groin adenopathy      Extremities:  2+ radial, ulnar  pulses bilaterally     Right 2nd digit - healed distal phalanx amputation     L 2nd digit - without ulceration     L 1st digit with healing ulceration near the nailbed       2+ femoral pulses bilaterally      2+ pedal pulses palpable.     No pedal edema     No ulcerations    LAB RESULTS:  Lab Results   Component Value Date    K 4.1 06/15/2018    K 3.6 01/19/2018    K 3.8 12/26/2017    CREATININE 0.67 06/15/2018    CREATININE 0.8 01/19/2018    CREATININE 0.9 12/26/2017     Lab Results   Component Value Date    WBC 7.72 06/15/2018    WBC 7.12 01/19/2018    WBC 8.38 12/26/2017    HCT 40.1 06/15/2018    HCT 39.9 01/19/2018    HCT 39.5 12/26/2017     06/15/2018     01/19/2018     12/26/2017     No results found for: HGBA1C  IMAGING:  CTA chest:  no arch lesions; no innominate, L subclavian or axillary artery lesion    PPG 6/18/18:  Worsening waveforms especially of the left 2nd digit and now with some flattening of the 3rd digit waveform    Right Finger PPGs 8/14/17  Right: All digital PPG waveforms within normal limits except for the 2nd digit.  Abnormal flat wave PPG waveforms of the 2nd digit noted.     TTE: EF 65%, no valvular vegetations; normal bubble study    IMP/PLAN:  63 y.o. 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  CTA chest: no inflow lesions; 2+ L radial pulse w negative L hand Hans's test  Continue ASA 81 po bid; statin rx  Hypercoagulable w/u is negative and this is unlikely given she is 61 yo and has never had an embolic event before    Normal perfusion to hands x2; has 2+ radial and ulnar arteries x2  Had + TOYA, though other markers have been negative, Rheum w/u has been otherwise non-reavealing    - recommend she continue to watch for symptoms of critical digit ischemia and we can restart Lovenox + Sildenafil at that time vs the risk of long term anticoagulation or side effects from medication    Jayce Araya MD  FACS  Vascular/Endovascular Surgery

## 2018-06-20 ENCOUNTER — TELEPHONE (OUTPATIENT)
Dept: OBSTETRICS AND GYNECOLOGY | Facility: CLINIC | Age: 63
End: 2018-06-20

## 2018-06-20 NOTE — TELEPHONE ENCOUNTER
----- Message from Ethan Cummings sent at 6/19/2018  4:30 PM CDT -----  Contact: patient            Name of Who is Calling: Shanda      What is the request in detail: patient is requesting a call back in reference to scheduling an appointment for [facility]      Can the clinic reply by [facility]: no      [facility]: [phone]

## 2018-06-21 ENCOUNTER — TELEPHONE (OUTPATIENT)
Dept: INTERNAL MEDICINE | Facility: CLINIC | Age: 63
End: 2018-06-21

## 2018-06-21 NOTE — TELEPHONE ENCOUNTER
----- Message from Hector Key sent at 6/21/2018  8:46 AM CDT -----  Contact: [phone]/self  Patient states she's returning your call.   Please call and advise

## 2018-06-28 ENCOUNTER — TELEPHONE (OUTPATIENT)
Dept: INTERNAL MEDICINE | Facility: CLINIC | Age: 63
End: 2018-06-28

## 2018-06-28 NOTE — TELEPHONE ENCOUNTER
Spoke with patient and informed her that Dr. Bassett is sick and we need to reschedule appt. Offered to schedule her with our other providers but she pushed appt back to the week of 7/16. Appt scheduled.

## 2018-06-28 NOTE — TELEPHONE ENCOUNTER
----- Message from Dorethairene Valle sent at 6/27/2018  3:51 PM CDT -----  Contact: self, [phone]  Patient called in returning your call. Please advise.

## 2018-07-01 RX ORDER — LOSARTAN POTASSIUM AND HYDROCHLOROTHIAZIDE 25; 100 MG/1; MG/1
TABLET ORAL
Qty: 30 TABLET | Refills: 0 | Status: SHIPPED | OUTPATIENT
Start: 2018-07-01 | End: 2018-07-31 | Stop reason: SDUPTHER

## 2018-07-25 RX ORDER — DICLOFENAC SODIUM 10 MG/G
GEL TOPICAL
Qty: 1 TUBE | Refills: 2 | Status: SHIPPED | OUTPATIENT
Start: 2018-07-25

## 2018-07-31 ENCOUNTER — OFFICE VISIT (OUTPATIENT)
Dept: INTERNAL MEDICINE | Facility: CLINIC | Age: 63
End: 2018-07-31
Payer: COMMERCIAL

## 2018-07-31 VITALS
WEIGHT: 191.13 LBS | HEIGHT: 64 IN | BODY MASS INDEX: 32.63 KG/M2 | DIASTOLIC BLOOD PRESSURE: 70 MMHG | OXYGEN SATURATION: 98 % | SYSTOLIC BLOOD PRESSURE: 120 MMHG | RESPIRATION RATE: 16 BRPM | HEART RATE: 76 BPM

## 2018-07-31 DIAGNOSIS — I73.9 PVD (PERIPHERAL VASCULAR DISEASE): ICD-10-CM

## 2018-07-31 DIAGNOSIS — I10 BENIGN HYPERTENSION: ICD-10-CM

## 2018-07-31 DIAGNOSIS — K21.9 GASTROESOPHAGEAL REFLUX DISEASE WITHOUT ESOPHAGITIS: ICD-10-CM

## 2018-07-31 DIAGNOSIS — R09.89 ALTERED TISSUE PERFUSION: Primary | ICD-10-CM

## 2018-07-31 PROBLEM — I75.011: Status: RESOLVED | Noted: 2017-09-07 | Resolved: 2018-07-31

## 2018-07-31 PROBLEM — M75.42 IMPINGEMENT SYNDROME OF LEFT SHOULDER: Status: RESOLVED | Noted: 2018-05-14 | Resolved: 2018-07-31

## 2018-07-31 PROCEDURE — 99213 OFFICE O/P EST LOW 20 MIN: CPT | Mod: S$GLB,,, | Performed by: INTERNAL MEDICINE

## 2018-07-31 PROCEDURE — 99999 PR PBB SHADOW E&M-EST. PATIENT-LVL III: CPT | Mod: PBBFAC,,, | Performed by: INTERNAL MEDICINE

## 2018-07-31 RX ORDER — LOSARTAN POTASSIUM AND HYDROCHLOROTHIAZIDE 25; 100 MG/1; MG/1
1 TABLET ORAL DAILY
Qty: 90 TABLET | Refills: 3 | Status: SHIPPED | OUTPATIENT
Start: 2018-07-31

## 2018-07-31 RX ORDER — PANTOPRAZOLE SODIUM 40 MG/1
TABLET, DELAYED RELEASE ORAL
COMMUNITY
Start: 2018-06-30 | End: 2018-09-06

## 2018-07-31 NOTE — PROGRESS NOTES
"Subjective:      Patient ID: Shanda Shaffer is a 63 y.o. female.    Chief Complaint: Annual Exam    HPI: 63 y.o. 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 On the left, I began sildenafil and lovenox.  With salvage of finger.  Now she tells me she had a vascular ultrasound of her hands, and they show reduced flow to all fingers.  She wears gloves,is careful not to injure her digits.  At present, no new issues.    -HTN  -Hyperlipidemia  -GERD, with esophageal stricture  -bladder prolapse, OAB.    She has had a judith work up to try to find the etiology of these atheroemboli, and small vascular web.    Review of Systems   Constitutional: Negative.    HENT: Negative.    Eyes: Negative.    Respiratory: Negative.    Cardiovascular: Negative.    Gastrointestinal: Negative.    Endocrine: Negative.    Genitourinary: Positive for frequency and urgency.   Musculoskeletal: Negative.    Allergic/Immunologic: Negative.    Neurological: Negative.         " cold" fingers, no change in color.   Hematological: Negative.    Psychiatric/Behavioral: Negative.        Objective:   /70 (BP Location: Left arm, Patient Position: Sitting, BP Method: Large (Manual))   Pulse 76   Resp 16   Ht 5' 4" (1.626 m)   Wt 86.7 kg (191 lb 1.6 oz)   SpO2 98%   BMI 32.80 kg/m²     Physical Exam   Constitutional: She is oriented to person, place, and time. She appears well-developed and well-nourished.   HENT:   Head: Normocephalic and atraumatic.   Nose: Nose normal.   Mouth/Throat: Oropharynx is clear and moist.   Eyes: Conjunctivae are normal.   Cardiovascular: Normal rate, regular rhythm and normal heart sounds.    Pulmonary/Chest: Effort normal and breath sounds normal.   Neurological: She is alert and oriented to person, place, and time. She displays normal reflexes. No cranial nerve deficit or sensory deficit. She exhibits normal muscle tone. " Coordination normal.   Skin: Skin is dry.   Psychiatric: She has a normal mood and affect. Her behavior is normal.   Nursing note and vitals reviewed.      Assessment:     1. Altered tissue perfusion    2. PVD (peripheral vascular disease)    3. Gastroesophageal reflux disease without esophagitis    4. Benign hypertension    So far able to keep all other digits.  Plan:     Altered tissue perfusion    PVD (peripheral vascular disease)    Gastroesophageal reflux disease without esophagitis    Benign hypertension    Other orders  -     losartan-hydrochlorothiazide 100-25 mg (HYZAAR) 100-25 mg per tablet; Take 1 tablet by mouth once daily.  Dispense: 90 tablet; Refill: 3    Stay on same regimen.

## 2018-08-01 ENCOUNTER — PATIENT MESSAGE (OUTPATIENT)
Dept: INTERNAL MEDICINE | Facility: CLINIC | Age: 63
End: 2018-08-01

## 2018-08-01 DIAGNOSIS — I75.011 ATHEROEMBOLISM OF RIGHT UPPER EXTREMITY: Primary | ICD-10-CM

## 2018-08-01 NOTE — TELEPHONE ENCOUNTER
Referral is there, just need to open encounter and associate dx and sign    After that I can fax it to toni

## 2018-08-07 ENCOUNTER — PATIENT MESSAGE (OUTPATIENT)
Dept: INTERNAL MEDICINE | Facility: CLINIC | Age: 63
End: 2018-08-07

## 2018-08-09 ENCOUNTER — OFFICE VISIT (OUTPATIENT)
Dept: OBSTETRICS AND GYNECOLOGY | Facility: CLINIC | Age: 63
End: 2018-08-09
Attending: OBSTETRICS & GYNECOLOGY
Payer: COMMERCIAL

## 2018-08-09 VITALS
BODY MASS INDEX: 32.14 KG/M2 | WEIGHT: 188.25 LBS | HEIGHT: 64 IN | DIASTOLIC BLOOD PRESSURE: 90 MMHG | SYSTOLIC BLOOD PRESSURE: 142 MMHG

## 2018-08-09 DIAGNOSIS — N95.2 VAGINAL ATROPHY: ICD-10-CM

## 2018-08-09 DIAGNOSIS — Z01.419 WELL WOMAN EXAM WITH ROUTINE GYNECOLOGICAL EXAM: Primary | ICD-10-CM

## 2018-08-09 PROCEDURE — 99396 PREV VISIT EST AGE 40-64: CPT | Mod: S$GLB,,, | Performed by: OBSTETRICS & GYNECOLOGY

## 2018-08-09 NOTE — PROGRESS NOTES
Subjective:       Patient ID: Shanda Shaffer is a 63 y.o. female.    Chief Complaint:  No chief complaint on file.      History of Present Illness  HPI  This 63 yr old P5 female is here for routine exam and is doing well.  She had new onset herpes last visit last yr and what appeared to be a decubitis ulcer.   Mammogram normal this yr with low risk.  Pap normal .  No further outbreaks and the ulcer cleared with what I prescribed last yr.  She is having vaginal dryness  GYN & OB History  No LMP recorded. Patient is postmenopausal.   Date of Last Pap: 5/3/2016    OB History    Para Term  AB Living   5 5 5         SAB TAB Ectopic Multiple Live Births                  # Outcome Date GA Lbr Teo/2nd Weight Sex Delivery Anes PTL Lv   5 Term            4 Term            3 Term            2 Term            1 Term                   Review of Systems  Review of Systems   Constitutional: Negative for chills and fever.   Respiratory: Negative for shortness of breath.    Cardiovascular: Negative for chest pain.   Gastrointestinal: Negative for abdominal pain, nausea and vomiting.   Genitourinary: Negative for difficulty urinating, dyspareunia, genital sores, menstrual problem, pelvic pain, vaginal bleeding, vaginal discharge and vaginal pain.   Skin: Negative for wound.   Hematological: Negative for adenopathy.           Objective:    Physical Exam:   Constitutional: She is oriented to person, place, and time. She appears well-developed and well-nourished.    HENT:   Head: Normocephalic.    Eyes: EOM are normal.    Neck: Normal range of motion.    Cardiovascular: Normal rate.     Pulmonary/Chest: Effort normal. She exhibits no mass and no tenderness. Right breast exhibits no inverted nipple, no mass, no skin change and no tenderness. Left breast exhibits no inverted nipple, no mass, no skin change and no tenderness.        Abdominal: Soft. She exhibits no distension. There is no tenderness.     Genitourinary:  Vagina normal and uterus normal. There is no rash, tenderness or lesion on the right labia. There is no rash, tenderness or lesion on the left labia. Uterus is not tender. Cervix is normal. Right adnexum displays no mass, no tenderness and no fullness. Left adnexum displays no mass, no tenderness and no fullness. Cervix exhibits no discharge.   Genitourinary Comments: Vaginal atrophy           Musculoskeletal: Normal range of motion.       Neurological: She is alert and oriented to person, place, and time.    Skin: Skin is warm and dry.    Psychiatric: She has a normal mood and affect.          Assessment:        1. Well woman exam with routine gynecological exam    2. Vaginal atrophy               Plan:      Routine follow up  Mammogram yearly  Pap next yr   May decide that she desires intrarosa and will send in Rx if she desires.

## 2018-09-06 ENCOUNTER — OFFICE VISIT (OUTPATIENT)
Dept: INTERNAL MEDICINE | Facility: CLINIC | Age: 63
End: 2018-09-06
Payer: COMMERCIAL

## 2018-09-06 ENCOUNTER — TELEPHONE (OUTPATIENT)
Dept: INTERNAL MEDICINE | Facility: CLINIC | Age: 63
End: 2018-09-06

## 2018-09-06 VITALS
OXYGEN SATURATION: 98 % | HEIGHT: 64 IN | TEMPERATURE: 98 F | BODY MASS INDEX: 31.62 KG/M2 | DIASTOLIC BLOOD PRESSURE: 70 MMHG | WEIGHT: 185.19 LBS | RESPIRATION RATE: 16 BRPM | HEART RATE: 74 BPM | SYSTOLIC BLOOD PRESSURE: 134 MMHG

## 2018-09-06 DIAGNOSIS — I75.012: ICD-10-CM

## 2018-09-06 DIAGNOSIS — Z00.00 ENCOUNTER FOR WELLNESS EXAMINATION IN ADULT: Primary | ICD-10-CM

## 2018-09-06 DIAGNOSIS — M17.12 PRIMARY OSTEOARTHRITIS OF LEFT KNEE: ICD-10-CM

## 2018-09-06 DIAGNOSIS — Z23 NEED FOR PNEUMOCOCCAL VACCINATION: ICD-10-CM

## 2018-09-06 PROCEDURE — 99999 PR PBB SHADOW E&M-EST. PATIENT-LVL III: CPT | Mod: PBBFAC,,, | Performed by: INTERNAL MEDICINE

## 2018-09-06 PROCEDURE — 99214 OFFICE O/P EST MOD 30 MIN: CPT | Mod: 25,S$GLB,, | Performed by: INTERNAL MEDICINE

## 2018-09-06 PROCEDURE — 90471 IMMUNIZATION ADMIN: CPT | Mod: S$GLB,,, | Performed by: INTERNAL MEDICINE

## 2018-09-06 PROCEDURE — 90732 PPSV23 VACC 2 YRS+ SUBQ/IM: CPT | Mod: S$GLB,,, | Performed by: INTERNAL MEDICINE

## 2018-09-06 NOTE — PROGRESS NOTES
Subjective:      Patient ID: Shanda Shaffer is a 63 y.o. female.    Chief Complaint: Follow-up (4 week follow up); Results (lab results from 6/2018); and Medication Refill    HPI: 63 y.o. White female , multiple issues:  -wellness exam  -wants an opinion about a re-do of her left total knee replacement  -confers about post op DVT anticoagulation, since she had been on lovenox, and did well  -confers re: Angélica opinion of vascular work up for both index fingers.    She is complaining about the constant pain in her left knee, and feeling of instability.  For the most part however, feels fine.  Her most recent labs:  06/15/18 0902 HDL 46 - Final result   06/15/18 0902 CHOL 145 - Final result   06/15/18 0902 TRIG 67 - Final result   06/15/18 0902 LDLCALC 85.6 - Final result   06/15/18 0902 CHOLHDL 31.7 - Final result   06/15/18 0902 NONHDLCHOL 99 - Final result   06/15/18 0902 TOTALCHOLEST 3.2 - Final result   06/15/18 0903 COLORU Yellow - Final result   06/15/18 0903 APPEARANCEUA Clear - Final result   06/15/18 0903 SPECGRAV 1.020 - Final result   06/15/18 0903 PHUR 8.0 - Final result     06/15/18 0903 NITRITE Negative - Final result   04/17/17 1134 WBCUR neg - Final result   06/15/18 0903 UROBILINOGEN Negative - Final result   04/17/17 1134 RBCUR neg - Final result   02/12/18 1234 INR 1.1 - Final result   06/15/18 0903 LEUKOCYTESUR Negative - Final result   06/15/18 0902 WBC 7.72 - Final result   06/15/18 0902 RBC 4.35 - Final result   06/15/18 0902 HGB 13.1 - Final result   06/15/18 0902 HCT 40.1 - Final result   06/15/18 0902 MCH 30.1 - Final result   06/15/18 0902 RDW 13.5 - Final result   06/15/18 0902  - Final result   06/15/18 0902 MPV 9.8 - Final result   06/15/18 0902  - Final result   06/15/18 0902 BUN 15 - Final result   06/15/18 0902 CREATININE 0.67 - Final result   06/15/18 0902 CALCIUM 9.4 - Final result   06/15/18 0902  - Final result   06/15/18 0902 K 4.1 - Final result   06/15/18  "0902  - Final result   06/15/18 0902 PROT 6.9 - Final result   06/15/18 0902 ALBUMIN 3.9 - Final result   06/15/18 0902 BILITOT 0.4 - Final result   06/15/18 0902 AST 26 - Final result   06/15/18 0902 ALKPHOS 88 - Final result   06/15/18 0902 CO2 30 Abnormal  High Final result   06/15/18 0902 ALT 40 - Final result   06/15/18 0902 ANIONGAP 8 - Final result   06/15/18 0902 EGFRNONAA >60.0 - Final result   06/15/18 0902 ESTGFRAFRICA >60.0 - Final result   02/24/16 0535 MG 2.3 - Final result   06/15/18 0902 MCV 92 - Final result   12/26/17 1614 CRP 0.9 - Final          Review of Systems   Constitutional: Negative.    HENT: Negative.    Eyes: Negative.    Respiratory: Negative.    Cardiovascular: Negative.    Gastrointestinal: Negative.    Endocrine: Negative.    Genitourinary: Negative.    Musculoskeletal: Positive for arthralgias and gait problem.   Skin: Negative.    Allergic/Immunologic: Negative.    Hematological: Negative.    Psychiatric/Behavioral: Negative.        Objective:   /70 (BP Location: Left arm, Patient Position: Sitting, BP Method: Medium (Manual))   Pulse 74   Temp 97.7 °F (36.5 °C) (Oral)   Resp 16   Ht 5' 4" (1.626 m)   Wt 84 kg (185 lb 3.2 oz)   SpO2 98%   BMI 31.79 kg/m²     Physical Exam   Constitutional: She is oriented to person, place, and time. She appears well-developed and well-nourished.   HENT:   Head: Normocephalic and atraumatic.   Right Ear: External ear normal.   Left Ear: External ear normal.   Nose: Nose normal.   Mouth/Throat: Oropharynx is clear and moist.   Eyes: Conjunctivae and EOM are normal. Pupils are equal, round, and reactive to light.   Neck: Normal range of motion. Neck supple.   Cardiovascular: Normal rate, regular rhythm and normal heart sounds.   Pulmonary/Chest: Effort normal and breath sounds normal.   Abdominal: Soft. Bowel sounds are normal.   Musculoskeletal: She exhibits deformity.   Neurological: She is alert and oriented to person, place, and " time.   Skin: Skin is warm and dry. Capillary refill takes less than 2 seconds.   Psychiatric: She has a normal mood and affect. Her behavior is normal. Judgment and thought content normal.   Nursing note and vitals reviewed.      Assessment:     1. Encounter for wellness examination in adult    2. Primary osteoarthritis of left knee    3. Need for pneumococcal vaccination    4. Atheroembolism of finger, left    Stable, and has done very well.  She had a full Rheum,Vascular,Cardiac, Heme  work up.  Plan:     Encounter for wellness examination in adult    Primary osteoarthritis of left knee    Need for pneumococcal vaccination  -     Cancel: Pneumococcal Conjugate Vaccine (13 Valent) (IM)  -     (In Office Administered) Pneumococcal Polysaccharide Vaccine (23 Valent) (SQ/IM)    Atheroembolism of finger, left

## 2018-09-17 DIAGNOSIS — M25.561 PAIN IN BOTH KNEES, UNSPECIFIED CHRONICITY: Primary | ICD-10-CM

## 2018-09-17 DIAGNOSIS — M25.562 PAIN IN BOTH KNEES, UNSPECIFIED CHRONICITY: Primary | ICD-10-CM

## 2018-09-17 RX ORDER — AMLODIPINE BESYLATE 10 MG/1
TABLET ORAL
Qty: 30 TABLET | Refills: 3 | Status: SHIPPED | OUTPATIENT
Start: 2018-09-17 | End: 2019-01-28 | Stop reason: SDUPTHER

## 2018-09-19 ENCOUNTER — OFFICE VISIT (OUTPATIENT)
Dept: ORTHOPEDICS | Facility: CLINIC | Age: 63
End: 2018-09-19
Payer: COMMERCIAL

## 2018-09-19 ENCOUNTER — HOSPITAL ENCOUNTER (OUTPATIENT)
Dept: RADIOLOGY | Facility: HOSPITAL | Age: 63
Discharge: HOME | End: 2018-09-19
Attending: ORTHOPAEDIC SURGERY
Payer: COMMERCIAL

## 2018-09-19 ENCOUNTER — PATIENT MESSAGE (OUTPATIENT)
Dept: INTERNAL MEDICINE | Facility: CLINIC | Age: 63
End: 2018-09-19

## 2018-09-19 VITALS — HEIGHT: 64 IN | WEIGHT: 185 LBS | BODY MASS INDEX: 31.58 KG/M2

## 2018-09-19 DIAGNOSIS — M25.562 PAIN IN BOTH KNEES, UNSPECIFIED CHRONICITY: ICD-10-CM

## 2018-09-19 DIAGNOSIS — Z96.652 S/P TKR (TOTAL KNEE REPLACEMENT) USING CEMENT, LEFT: ICD-10-CM

## 2018-09-19 DIAGNOSIS — M25.561 PAIN IN BOTH KNEES, UNSPECIFIED CHRONICITY: ICD-10-CM

## 2018-09-19 DIAGNOSIS — M25.362 UNSTABLE KNEE, LEFT: Primary | ICD-10-CM

## 2018-09-19 PROCEDURE — 99999 PR PBB SHADOW E&M-EST. PATIENT-LVL II: CPT | Mod: PBBFAC,,, | Performed by: ORTHOPAEDIC SURGERY

## 2018-09-19 PROCEDURE — 73562 X-RAY EXAM OF KNEE 3: CPT | Mod: 26,50,, | Performed by: RADIOLOGY

## 2018-09-19 PROCEDURE — 73562 X-RAY EXAM OF KNEE 3: CPT | Mod: TC,50

## 2018-09-19 PROCEDURE — 99213 OFFICE O/P EST LOW 20 MIN: CPT | Mod: S$GLB,,, | Performed by: ORTHOPAEDIC SURGERY

## 2018-09-19 RX ORDER — MUPIROCIN 20 MG/G
OINTMENT TOPICAL
COMMUNITY
Start: 2018-09-06

## 2018-09-19 NOTE — PROGRESS NOTES
"Subjective:      Patient ID: Shanda Shaffer is a 63 y.o. female.    Chief Complaint: Pain of the Left Knee and Pain of the Right Knee    HPI  Shanda Shaffer has left knee pain.  The pain has worsened and it feels loose. The pain is located in the anterior aspect of the knee.  There  is not radiation. There is not associated stiffness.   There is not catching and locking. The pain is described as achy. The pain is aggravated by sitting, standing, stairs, walking.  It is alleviated by nothing consistently.  There is not associated back pain.  Her history, medications and problem list were reviewed.    Review of Systems   Constitution: Negative for chills, fever and night sweats.   HENT: Negative for hearing loss.    Eyes: Negative for blurred vision and double vision.   Cardiovascular: Negative for chest pain, claudication and leg swelling.   Respiratory: Negative for shortness of breath.    Endocrine: Negative for polydipsia, polyphagia and polyuria.   Hematologic/Lymphatic: Negative for adenopathy and bleeding problem. Does not bruise/bleed easily.   Skin: Negative for poor wound healing.   Musculoskeletal: Positive for joint pain.   Gastrointestinal: Negative for diarrhea and heartburn.   Genitourinary: Negative for bladder incontinence.   Neurological: Negative for focal weakness, headaches, numbness, paresthesias and sensory change.   Psychiatric/Behavioral: The patient is not nervous/anxious.    Allergic/Immunologic: Negative for persistent infections.         Objective:      Body mass index is 31.76 kg/m².  Vitals:    09/19/18 1420   Weight: 83.9 kg (185 lb)   Height: 5' 4" (1.626 m)               General Musculoskeletal Exam   Gait: normal       Right Knee Exam     Inspection   Erythema: absent  Scars: present  Swelling: absent  Effusion: effusion  Deformity: deformity  Bruising: absent    Tenderness   The patient is experiencing no tenderness.     Range of Motion   Extension: 0   Flexion: 130     Tests "   Ligament Examination Lachman: normal (-1 to 2mm)   MCL - Valgus: normal (0 to 2mm)  LCL - Varus: normal  Patella   Passive Patellar Tilt: neutral    Other   Sensation: normal    Left Knee Exam     Inspection   Erythema: absent  Scars: present  Swelling: present  Effusion: absent  Deformity: deformity  Bruising: absent    Tenderness   The patient tender to palpation of the patellar tendon, lateral retinaculum and medial retinaculum.    Range of Motion   Extension: 0   Flexion: 130     Tests   Stability   Lachman: abnormal  - grade II  MCL - Valgus: abnormal - grade II  LCL - Varus: abnormal - grade II  Patella   Passive Patellar Tilt: neutral    Other   Sensation: normal    Muscle Strength   Right Lower Extremity   Hip Abduction: 5/5   Quadriceps:  5/5   Hamstrin/5   Left Lower Extremity   Hip Abduction: 5/5   Quadriceps:  5/5   Hamstrin/5     Reflexes     Left Side  Quadriceps:  2+    Right Side   Quadriceps:  2+    Vascular Exam     Right Pulses  Dorsalis Pedis:      2+          Left Pulses  Dorsalis Pedis:      2+          Edema  Right Lower Leg: absent  Left Lower Leg: absent      Radiographs taken today were reviewed by me.  There is a prosthetic replacement of the left knee(s).  The prosthesis is well positioned.  There is not evidence of bone loss, osteolysis, or loosening. There is not a fracture.              Assessment:       Encounter Diagnoses   Name Primary?    Unstable knee, left Yes    S/P TKR (total knee replacement) using cement, left           Plan:       Shanda was seen today for pain and pain.    Diagnoses and all orders for this visit:    Unstable knee, left    S/P TKR (total knee replacement) using cement, left        She has a definite component of midflexion instability.  Options discuss.  I am recommending poly exchange to thisker insert.  She will consider this.  F/U in 4 months.  Sooner if needed

## 2018-12-04 ENCOUNTER — PATIENT MESSAGE (OUTPATIENT)
Dept: OBSTETRICS AND GYNECOLOGY | Facility: CLINIC | Age: 63
End: 2018-12-04

## 2018-12-27 ENCOUNTER — TELEPHONE (OUTPATIENT)
Dept: FAMILY MEDICINE | Facility: CLINIC | Age: 63
End: 2018-12-27

## 2018-12-27 NOTE — TELEPHONE ENCOUNTER
----- Message from Evon Lara sent at 12/27/2018  1:31 PM CST -----  Contact: [phone]/ self   Patient called in returning your call. Please advise

## 2018-12-27 NOTE — TELEPHONE ENCOUNTER
I called and spoke with patient in reference to her requesting to see Dr. Bassett for swollen legs. Patient stated she had surgery on left leg in October of this year. I did ask patient if she called the physician who did her surgery, patient stated her swollen legs has nothing to do with her surgery. Patient want to be seen only by Dr. Bassett. I told her Dr. Bassett is out on medical leave, but we do have other physician available if need to be seen. Patient is on [facility]. I told her she can make her appointment to be seen for same day appointment here in [place], which will be lynda Izaguirre and on Wednesday only we have Dr. Hamilton. Patient stated she will make an appointment through [facility]. I did tell patient if at anytime her leg swells and really bother her she need to go to urgent care or the emergency dept as soon as possible. Vf/ma

## 2019-01-28 DIAGNOSIS — I75.012: ICD-10-CM

## 2019-01-28 DIAGNOSIS — E78.2 MIXED HYPERLIPIDEMIA: ICD-10-CM

## 2019-01-29 RX ORDER — ROSUVASTATIN CALCIUM 20 MG/1
TABLET, COATED ORAL
Qty: 30 TABLET | Refills: 1 | Status: SHIPPED | OUTPATIENT
Start: 2019-01-29 | End: 2019-04-08 | Stop reason: SDUPTHER

## 2019-01-29 RX ORDER — AMLODIPINE BESYLATE 10 MG/1
TABLET ORAL
Qty: 30 TABLET | Refills: 0 | Status: SHIPPED | OUTPATIENT
Start: 2019-01-29 | End: 2019-02-27 | Stop reason: SDUPTHER

## 2019-02-04 ENCOUNTER — TELEPHONE (OUTPATIENT)
Dept: FAMILY MEDICINE | Facility: CLINIC | Age: 64
End: 2019-02-04

## 2019-02-04 NOTE — TELEPHONE ENCOUNTER
Phoned pt. Pt states that she would talk to Dr Bassett about Alzheimer's testing on her next appt.

## 2019-02-04 NOTE — TELEPHONE ENCOUNTER
----- Message from Genny Gentile sent at 2/4/2019  9:55 AM CST -----  Contact: self/ [phone]  Patient asked if she can get blood work orders for alzheimers.    Please call.

## 2019-02-08 ENCOUNTER — TELEPHONE (OUTPATIENT)
Dept: OBSTETRICS AND GYNECOLOGY | Facility: CLINIC | Age: 64
End: 2019-02-08

## 2019-02-08 NOTE — TELEPHONE ENCOUNTER
----- Message from Mirta Black sent at 2/8/2019  2:24 PM CST -----  Name of Who is Calling: GLADYS FOLEY [[id number]]    What is the request in detail: Pt states the discount card that was provided to her is invalid. Wants to know if the Dr has an updated card      Can the clinic reply by [facility]:   No       What [facility] Back [facility]: #[phone]

## 2019-02-27 ENCOUNTER — PATIENT MESSAGE (OUTPATIENT)
Dept: RHEUMATOLOGY | Facility: CLINIC | Age: 64
End: 2019-02-27

## 2019-02-27 RX ORDER — AMLODIPINE BESYLATE 10 MG/1
TABLET ORAL
Qty: 30 TABLET | Refills: 0 | Status: SHIPPED | OUTPATIENT
Start: 2019-02-27 | End: 2019-04-08 | Stop reason: SDUPTHER

## 2019-03-13 ENCOUNTER — TELEPHONE (OUTPATIENT)
Dept: FAMILY MEDICINE | Facility: CLINIC | Age: 64
End: 2019-03-13

## 2019-03-13 ENCOUNTER — PATIENT MESSAGE (OUTPATIENT)
Dept: FAMILY MEDICINE | Facility: CLINIC | Age: 64
End: 2019-03-13

## 2019-03-13 NOTE — TELEPHONE ENCOUNTER
----- Message from Dary Beckham sent at 3/13/2019  4:28 PM CDT -----  Patient would like to speak with someone ,she had a appointment today that was canceled and just needed to speak with doctor Bassett . Can someone please call her [phone].

## 2019-03-13 NOTE — TELEPHONE ENCOUNTER
Spoke with patient and she states that she would like to talk to Dr. Bassett regarding the status of her finger and to tell her bye since [place].

## 2019-04-08 DIAGNOSIS — E78.2 MIXED HYPERLIPIDEMIA: ICD-10-CM

## 2019-04-08 DIAGNOSIS — I75.012: ICD-10-CM

## 2019-04-08 RX ORDER — AMLODIPINE BESYLATE 10 MG/1
TABLET ORAL
Qty: 60 TABLET | Refills: 0 | Status: SHIPPED | OUTPATIENT
Start: 2019-04-08

## 2019-04-08 RX ORDER — ROSUVASTATIN CALCIUM 20 MG/1
TABLET, COATED ORAL
Qty: 30 TABLET | Refills: 0 | Status: SHIPPED | OUTPATIENT
Start: 2019-04-08

## 2019-04-23 ENCOUNTER — PATIENT MESSAGE (OUTPATIENT)
Dept: OBSTETRICS AND GYNECOLOGY | Facility: CLINIC | Age: 64
End: 2019-04-23

## 2019-05-08 DIAGNOSIS — I75.012: ICD-10-CM

## 2019-05-08 DIAGNOSIS — E78.2 MIXED HYPERLIPIDEMIA: ICD-10-CM

## 2019-05-09 RX ORDER — ROSUVASTATIN CALCIUM 20 MG/1
TABLET, COATED ORAL
Qty: 30 TABLET | Refills: 0 | OUTPATIENT
Start: 2019-05-09

## 2019-05-20 DIAGNOSIS — E78.2 MIXED HYPERLIPIDEMIA: ICD-10-CM

## 2019-05-20 DIAGNOSIS — I75.012: ICD-10-CM

## 2019-05-20 RX ORDER — ROSUVASTATIN CALCIUM 20 MG/1
TABLET, COATED ORAL
Qty: 30 TABLET | Refills: 0 | OUTPATIENT
Start: 2019-05-20

## 2019-05-20 NOTE — TELEPHONE ENCOUNTER
----- Message from Lili Ballard sent at 5/20/2019  8:17 AM CDT -----  Contact: [phone]/self  Patient called in requesting to speak with you. Patient prefers to speak with a nurse. Please advise.

## 2020-10-16 NOTE — PROGRESS NOTES
"Subjective:      Patient ID: Shanda Shaffer is a 62 y.o. female.    Chief Complaint: wound on right index finger and Hand Pain    HPI: 62 y.o. White female, returns emergently because she believes her last phalanx on left index finger is getting duskier,   And is giving her more pain.   When I saw her for the first time 1/15/18, I placed her on high dose statin and Zovirax, after reviewing her  Extensive notes,imaging,history etc.  Asked pt. To come in should this worsen.  Have examined pt, and asked Dr. Villegas ( Cardiologist here at [facility] ) to  Also see pt with me.                Review of Systems   Constitutional: Negative.    HENT: Negative.    Eyes: Negative for visual disturbance.   Respiratory: Negative for cough, choking, chest tightness, shortness of breath and wheezing.    Cardiovascular: Negative for chest pain and palpitations.   Gastrointestinal: Negative.    Musculoskeletal:        Pain worse in left index finger.   Neurological: Negative for dizziness, syncope, weakness and headaches.   Psychiatric/Behavioral: Positive for dysphoric mood.       Objective:   /76 (BP Location: Right arm, Patient Position: Sitting, BP Method: Large (Manual))   Pulse 79   Temp 97.5 °F (36.4 °C) (Oral)   Resp 18   Ht 5' 4" (1.626 m)   Wt 82.8 kg (182 lb 8.7 oz)   SpO2 97%   BMI 31.33 kg/m²     Physical Exam   Constitutional: She is oriented to person, place, and time. She appears well-developed and well-nourished.   HENT:   Head: Normocephalic and atraumatic.   Right Ear: External ear normal.   Left Ear: External ear normal.   Nose: Nose normal.   Mouth/Throat: Oropharynx is clear and moist.   Eyes: Conjunctivae and EOM are normal. Pupils are equal, round, and reactive to light.   Neck: Normal range of motion. Neck supple.   Cardiovascular: Normal rate, regular rhythm and normal heart sounds.    Pulmonary/Chest: Effort normal and breath sounds normal.   Musculoskeletal:   See photo of left index " 2 RN Skin Check    2 RN skin check complete with RN Mercedez  Devices in place: n/a.  Skin assessed under devices: yes.  Confirmed pressure ulcers found on: n/a.  New potential pressure ulcers noted on n/a  . Wound consult placed n/a.  The following interventions in place Pillows and mepilex    Sacrum intact with some redness that blanches  Groin and skin folds intact  Got a short gilmpse at heels which look dry  Elbows intact and blanching     Patient started to hit RN and become combative so were not able to fully take off socksbto examine the heels fully or back of ears     last phalanx.  Hans +   Neurological: She is alert and oriented to person, place, and time.   Skin:   No stigmata of exantema   Nursing note and vitals reviewed.      Assessment:     1. Ischemic ulcer of finger, unspecified ulcer stage    2. Altered tissue perfusion    3. Former smoker: 1 pack a day for 30 years, quit 3/11/10    4. Mixed hyperlipidemia    This pt is well known to , Heme Onc and Rheumatology.  However, this is now a worsening lesion, requiring further work up, preferably in pt.  Plan:     Ischemic ulcer of finger, unspecified ulcer stage    Altered tissue perfusion    Former smoker: 1 pack a day for 30 years, quit 3/11/10    Mixed hyperlipidemia    I have referred to [facility], ER.  I have spoken to Dr. Smyth.  Dr. Villegas has spoken to Dr. Araya, who requests that Vascular surgery be notified upon pt's arrival  To ER for admission.

## 2024-08-27 NOTE — TELEPHONE ENCOUNTER
Spoke to pt and advised. Pt stated that Dr. Reaves was checking on possibly getting her seen sooner here. She would like to hold off going outside of [facility] until she hears back from him. If he is unable to get sooner appt she will schedule outside of [facility]. Advised pt to get us informed.   Radha

## (undated) DEVICE — BANDAGE ADHESIVE